# Patient Record
Sex: FEMALE | Race: WHITE | NOT HISPANIC OR LATINO
[De-identification: names, ages, dates, MRNs, and addresses within clinical notes are randomized per-mention and may not be internally consistent; named-entity substitution may affect disease eponyms.]

---

## 2017-03-30 ENCOUNTER — RECORD ABSTRACTING (OUTPATIENT)
Age: 42
End: 2017-03-30

## 2017-03-30 DIAGNOSIS — N32.81 OVERACTIVE BLADDER: ICD-10-CM

## 2017-03-30 DIAGNOSIS — M67.40 GANGLION, UNSPECIFIED SITE: ICD-10-CM

## 2017-03-30 DIAGNOSIS — F41.9 ANXIETY DISORDER, UNSPECIFIED: ICD-10-CM

## 2017-03-30 DIAGNOSIS — Z12.4 ENCOUNTER FOR SCREENING FOR MALIGNANT NEOPLASM OF CERVIX: ICD-10-CM

## 2017-04-05 ENCOUNTER — OUTPATIENT (OUTPATIENT)
Dept: OUTPATIENT SERVICES | Facility: HOSPITAL | Age: 42
LOS: 1 days | Discharge: HOME | End: 2017-04-05

## 2017-06-27 DIAGNOSIS — E11.9 TYPE 2 DIABETES MELLITUS WITHOUT COMPLICATIONS: ICD-10-CM

## 2017-06-27 DIAGNOSIS — E53.8 DEFICIENCY OF OTHER SPECIFIED B GROUP VITAMINS: ICD-10-CM

## 2017-06-27 DIAGNOSIS — N39.0 URINARY TRACT INFECTION, SITE NOT SPECIFIED: ICD-10-CM

## 2017-06-27 DIAGNOSIS — E55.9 VITAMIN D DEFICIENCY, UNSPECIFIED: ICD-10-CM

## 2017-06-27 DIAGNOSIS — D64.9 ANEMIA, UNSPECIFIED: ICD-10-CM

## 2017-07-17 ENCOUNTER — OUTPATIENT (OUTPATIENT)
Dept: OUTPATIENT SERVICES | Facility: HOSPITAL | Age: 42
LOS: 1 days | Discharge: HOME | End: 2017-07-17

## 2017-07-17 DIAGNOSIS — E03.9 HYPOTHYROIDISM, UNSPECIFIED: ICD-10-CM

## 2017-07-17 DIAGNOSIS — I10 ESSENTIAL (PRIMARY) HYPERTENSION: ICD-10-CM

## 2017-07-17 DIAGNOSIS — E11.9 TYPE 2 DIABETES MELLITUS WITHOUT COMPLICATIONS: ICD-10-CM

## 2017-07-17 DIAGNOSIS — N18.3 CHRONIC KIDNEY DISEASE, STAGE 3 (MODERATE): ICD-10-CM

## 2017-08-28 ENCOUNTER — OUTPATIENT (OUTPATIENT)
Dept: OUTPATIENT SERVICES | Facility: HOSPITAL | Age: 42
LOS: 1 days | Discharge: HOME | End: 2017-08-28

## 2017-08-28 DIAGNOSIS — R31.9 HEMATURIA, UNSPECIFIED: ICD-10-CM

## 2018-02-28 ENCOUNTER — OUTPATIENT (OUTPATIENT)
Dept: OUTPATIENT SERVICES | Facility: HOSPITAL | Age: 43
LOS: 1 days | Discharge: HOME | End: 2018-02-28

## 2018-02-28 DIAGNOSIS — E78.5 HYPERLIPIDEMIA, UNSPECIFIED: ICD-10-CM

## 2018-02-28 DIAGNOSIS — N39.0 URINARY TRACT INFECTION, SITE NOT SPECIFIED: ICD-10-CM

## 2018-02-28 DIAGNOSIS — E11.9 TYPE 2 DIABETES MELLITUS WITHOUT COMPLICATIONS: ICD-10-CM

## 2018-02-28 DIAGNOSIS — D50.9 IRON DEFICIENCY ANEMIA, UNSPECIFIED: ICD-10-CM

## 2018-02-28 DIAGNOSIS — E55.9 VITAMIN D DEFICIENCY, UNSPECIFIED: ICD-10-CM

## 2018-02-28 DIAGNOSIS — E53.8 DEFICIENCY OF OTHER SPECIFIED B GROUP VITAMINS: ICD-10-CM

## 2018-02-28 DIAGNOSIS — D64.9 ANEMIA, UNSPECIFIED: ICD-10-CM

## 2018-02-28 DIAGNOSIS — E05.90 THYROTOXICOSIS, UNSPECIFIED WITHOUT THYROTOXIC CRISIS OR STORM: ICD-10-CM

## 2018-06-05 ENCOUNTER — OUTPATIENT (OUTPATIENT)
Dept: OUTPATIENT SERVICES | Facility: HOSPITAL | Age: 43
LOS: 1 days | Discharge: HOME | End: 2018-06-05

## 2018-06-05 ENCOUNTER — RESULT REVIEW (OUTPATIENT)
Age: 43
End: 2018-06-05

## 2018-06-06 DIAGNOSIS — N89.8 OTHER SPECIFIED NONINFLAMMATORY DISORDERS OF VAGINA: ICD-10-CM

## 2018-09-19 ENCOUNTER — OUTPATIENT (OUTPATIENT)
Dept: OUTPATIENT SERVICES | Facility: HOSPITAL | Age: 43
LOS: 1 days | Discharge: HOME | End: 2018-09-19

## 2018-09-19 DIAGNOSIS — A69.20 LYME DISEASE, UNSPECIFIED: ICD-10-CM

## 2018-09-19 DIAGNOSIS — E11.9 TYPE 2 DIABETES MELLITUS WITHOUT COMPLICATIONS: ICD-10-CM

## 2018-09-19 DIAGNOSIS — N39.0 URINARY TRACT INFECTION, SITE NOT SPECIFIED: ICD-10-CM

## 2018-09-19 DIAGNOSIS — E64.9 SEQUELAE OF UNSPECIFIED NUTRITIONAL DEFICIENCY: ICD-10-CM

## 2018-09-19 DIAGNOSIS — E05.90 THYROTOXICOSIS, UNSPECIFIED WITHOUT THYROTOXIC CRISIS OR STORM: ICD-10-CM

## 2018-09-19 DIAGNOSIS — E78.5 HYPERLIPIDEMIA, UNSPECIFIED: ICD-10-CM

## 2018-09-19 DIAGNOSIS — D50.9 IRON DEFICIENCY ANEMIA, UNSPECIFIED: ICD-10-CM

## 2018-09-19 DIAGNOSIS — E55.9 VITAMIN D DEFICIENCY, UNSPECIFIED: ICD-10-CM

## 2018-09-19 LAB
ALBUMIN SERPL ELPH-MCNC: 4.8 G/DL — SIGNIFICANT CHANGE UP (ref 3.5–5.2)
ALP SERPL-CCNC: 54 U/L — SIGNIFICANT CHANGE UP (ref 30–115)
ALT FLD-CCNC: 9 U/L — SIGNIFICANT CHANGE UP (ref 0–41)
ANION GAP SERPL CALC-SCNC: 14 MMOL/L — SIGNIFICANT CHANGE UP (ref 7–14)
APPEARANCE UR: ABNORMAL
AST SERPL-CCNC: 15 U/L — SIGNIFICANT CHANGE UP (ref 0–41)
BACTERIA # UR AUTO: ABNORMAL /HPF
BASOPHILS # BLD AUTO: 0.05 K/UL — SIGNIFICANT CHANGE UP (ref 0–0.2)
BASOPHILS NFR BLD AUTO: 1 % — SIGNIFICANT CHANGE UP (ref 0–1)
BILIRUB SERPL-MCNC: 0.5 MG/DL — SIGNIFICANT CHANGE UP (ref 0.2–1.2)
BILIRUB UR-MCNC: NEGATIVE — SIGNIFICANT CHANGE UP
BUN SERPL-MCNC: 23 MG/DL — HIGH (ref 10–20)
CALCIUM SERPL-MCNC: 9.3 MG/DL — SIGNIFICANT CHANGE UP (ref 8.5–10.1)
CHLORIDE SERPL-SCNC: 101 MMOL/L — SIGNIFICANT CHANGE UP (ref 98–110)
CHOLEST SERPL-MCNC: 191 MG/DL — SIGNIFICANT CHANGE UP (ref 100–200)
CO2 SERPL-SCNC: 24 MMOL/L — SIGNIFICANT CHANGE UP (ref 17–32)
COD CRY URNS QL: ABNORMAL /HPF
COLOR SPEC: SIGNIFICANT CHANGE UP
CREAT SERPL-MCNC: 0.9 MG/DL — SIGNIFICANT CHANGE UP (ref 0.7–1.5)
DIFF PNL FLD: ABNORMAL
EOSINOPHIL # BLD AUTO: 0.18 K/UL — SIGNIFICANT CHANGE UP (ref 0–0.7)
EOSINOPHIL NFR BLD AUTO: 3.5 % — SIGNIFICANT CHANGE UP (ref 0–8)
EPI CELLS # UR: ABNORMAL /HPF
ERYTHROCYTE [SEDIMENTATION RATE] IN BLOOD: 8 MM/HR — SIGNIFICANT CHANGE UP (ref 0–15)
ESTIMATED AVERAGE GLUCOSE: 105 MG/DL — SIGNIFICANT CHANGE UP (ref 68–114)
GLUCOSE SERPL-MCNC: 106 MG/DL — HIGH (ref 70–99)
GLUCOSE UR QL: NEGATIVE — SIGNIFICANT CHANGE UP
HBA1C BLD-MCNC: 5.3 % — SIGNIFICANT CHANGE UP (ref 4–5.6)
HCT VFR BLD CALC: 36.3 % — LOW (ref 37–47)
HDLC SERPL-MCNC: 95 MG/DL — SIGNIFICANT CHANGE UP
HGB BLD-MCNC: 11.5 G/DL — LOW (ref 12–16)
IMM GRANULOCYTES NFR BLD AUTO: 0.4 % — HIGH (ref 0.1–0.3)
IRON SATN MFR SERPL: 138 UG/DL — SIGNIFICANT CHANGE UP (ref 35–150)
KETONES UR-MCNC: NEGATIVE — SIGNIFICANT CHANGE UP
LEUKOCYTE ESTERASE UR-ACNC: NEGATIVE — SIGNIFICANT CHANGE UP
LIPID PNL WITH DIRECT LDL SERPL: 97 MG/DL — SIGNIFICANT CHANGE UP (ref 4–129)
LYMPHOCYTES # BLD AUTO: 1.29 K/UL — SIGNIFICANT CHANGE UP (ref 1.2–3.4)
LYMPHOCYTES # BLD AUTO: 25.3 % — SIGNIFICANT CHANGE UP (ref 20.5–51.1)
MCHC RBC-ENTMCNC: 30 PG — SIGNIFICANT CHANGE UP (ref 27–31)
MCHC RBC-ENTMCNC: 31.7 G/DL — LOW (ref 32–37)
MCV RBC AUTO: 94.8 FL — SIGNIFICANT CHANGE UP (ref 81–99)
MONOCYTES # BLD AUTO: 0.47 K/UL — SIGNIFICANT CHANGE UP (ref 0.1–0.6)
MONOCYTES NFR BLD AUTO: 9.2 % — SIGNIFICANT CHANGE UP (ref 1.7–9.3)
NEUTROPHILS # BLD AUTO: 3.09 K/UL — SIGNIFICANT CHANGE UP (ref 1.4–6.5)
NEUTROPHILS NFR BLD AUTO: 60.6 % — SIGNIFICANT CHANGE UP (ref 42.2–75.2)
NITRITE UR-MCNC: NEGATIVE — SIGNIFICANT CHANGE UP
NRBC # BLD: 0 /100 WBCS — SIGNIFICANT CHANGE UP (ref 0–0)
PH UR: 6 — SIGNIFICANT CHANGE UP (ref 5–8)
PLATELET # BLD AUTO: 315 K/UL — SIGNIFICANT CHANGE UP (ref 130–400)
POTASSIUM SERPL-MCNC: 4.1 MMOL/L — SIGNIFICANT CHANGE UP (ref 3.5–5)
POTASSIUM SERPL-SCNC: 4.1 MMOL/L — SIGNIFICANT CHANGE UP (ref 3.5–5)
PROT SERPL-MCNC: 7.5 G/DL — SIGNIFICANT CHANGE UP (ref 6–8)
PROT UR-MCNC: ABNORMAL
RBC # BLD: 3.83 M/UL — LOW (ref 4.2–5.4)
RBC # FLD: 14.6 % — HIGH (ref 11.5–14.5)
RBC CASTS # UR COMP ASSIST: ABNORMAL /HPF
SODIUM SERPL-SCNC: 139 MMOL/L — SIGNIFICANT CHANGE UP (ref 135–146)
SP GR SPEC: >=1.03 — SIGNIFICANT CHANGE UP (ref 1.01–1.03)
TOTAL CHOLESTEROL/HDL RATIO MEASUREMENT: 2 RATIO — LOW (ref 4–5.5)
TRIGL SERPL-MCNC: 111 MG/DL — SIGNIFICANT CHANGE UP (ref 10–149)
UROBILINOGEN FLD QL: 0.2 — SIGNIFICANT CHANGE UP (ref 0.2–0.2)
WBC # BLD: 5.1 K/UL — SIGNIFICANT CHANGE UP (ref 4.8–10.8)
WBC # FLD AUTO: 5.1 K/UL — SIGNIFICANT CHANGE UP (ref 4.8–10.8)
WBC UR QL: SIGNIFICANT CHANGE UP /HPF

## 2018-09-20 LAB
24R-OH-CALCIDIOL SERPL-MCNC: 78 NG/ML — SIGNIFICANT CHANGE UP (ref 30–80)
CCP IGG SERPL-ACNC: <8 UNITS — SIGNIFICANT CHANGE UP (ref 0–19)
CRP SERPL-MCNC: 0.11 MG/DL — SIGNIFICANT CHANGE UP (ref 0–0.4)
DSDNA AB SER-ACNC: <12 IU/ML — SIGNIFICANT CHANGE UP
FERRITIN SERPL-MCNC: 17 NG/ML — SIGNIFICANT CHANGE UP (ref 15–150)
FOLATE SERPL-MCNC: >20 NG/ML — SIGNIFICANT CHANGE UP
RF+CCP IGG SER-IMP: NEGATIVE — SIGNIFICANT CHANGE UP
RHEUMATOID FACT SERPL-ACNC: <10 IU/ML — SIGNIFICANT CHANGE UP (ref 0–13)
THYROGLOB AB FLD IA-ACNC: <20 IU/ML — SIGNIFICANT CHANGE UP (ref 0–40)
THYROGLOB AB SERPL-ACNC: <20 IU/ML — SIGNIFICANT CHANGE UP (ref 0–40)
TSH SERPL-MCNC: 1.91 UIU/ML — SIGNIFICANT CHANGE UP (ref 0.27–4.2)
VIT B12 SERPL-MCNC: 439 PG/ML — SIGNIFICANT CHANGE UP (ref 232–1245)

## 2018-09-21 LAB
ANA PAT FLD IF-IMP: SIGNIFICANT CHANGE UP
ANA TITR SER: ABNORMAL
B BURGDOR C6 AB SER-ACNC: NEGATIVE — SIGNIFICANT CHANGE UP
B BURGDOR IGG+IGM SER-ACNC: 0.14 INDEX — SIGNIFICANT CHANGE UP (ref 0.01–0.89)

## 2019-03-04 ENCOUNTER — OUTPATIENT (OUTPATIENT)
Dept: OUTPATIENT SERVICES | Facility: HOSPITAL | Age: 44
LOS: 1 days | Discharge: HOME | End: 2019-03-04

## 2019-03-04 DIAGNOSIS — Z11.1 ENCOUNTER FOR SCREENING FOR RESPIRATORY TUBERCULOSIS: ICD-10-CM

## 2019-06-10 ENCOUNTER — RESULT REVIEW (OUTPATIENT)
Age: 44
End: 2019-06-10

## 2019-06-10 ENCOUNTER — OUTPATIENT (OUTPATIENT)
Dept: OUTPATIENT SERVICES | Facility: HOSPITAL | Age: 44
LOS: 1 days | Discharge: HOME | End: 2019-06-10

## 2019-06-12 DIAGNOSIS — Z12.4 ENCOUNTER FOR SCREENING FOR MALIGNANT NEOPLASM OF CERVIX: ICD-10-CM

## 2019-09-23 ENCOUNTER — APPOINTMENT (OUTPATIENT)
Dept: GASTROENTEROLOGY | Facility: CLINIC | Age: 44
End: 2019-09-23
Payer: COMMERCIAL

## 2019-09-23 VITALS
WEIGHT: 129 LBS | DIASTOLIC BLOOD PRESSURE: 98 MMHG | HEIGHT: 66 IN | HEART RATE: 83 BPM | BODY MASS INDEX: 20.73 KG/M2 | SYSTOLIC BLOOD PRESSURE: 126 MMHG

## 2019-09-23 DIAGNOSIS — Z87.898 PERSONAL HISTORY OF OTHER SPECIFIED CONDITIONS: ICD-10-CM

## 2019-09-23 DIAGNOSIS — Z86.79 PERSONAL HISTORY OF OTHER DISEASES OF THE CIRCULATORY SYSTEM: ICD-10-CM

## 2019-09-23 DIAGNOSIS — Z78.9 OTHER SPECIFIED HEALTH STATUS: ICD-10-CM

## 2019-09-23 DIAGNOSIS — G43.909 MIGRAINE, UNSPECIFIED, NOT INTRACTABLE, W/OUT STATUS MIGRAINOSUS: ICD-10-CM

## 2019-09-23 PROCEDURE — 99204 OFFICE O/P NEW MOD 45 MIN: CPT

## 2019-09-23 NOTE — PHYSICAL EXAM
[General Appearance - Alert] : alert [Sclera] : the sclera and conjunctiva were normal [Neck Appearance] : the appearance of the neck was normal [Auscultation Breath Sounds / Voice Sounds] : lungs were clear to auscultation bilaterally [Heart Sounds] : normal S1 and S2 [Abdomen Soft] : soft [Bowel Sounds] : normal bowel sounds [] : no hepato-splenomegaly [Abdomen Tenderness] : non-tender [Abdomen Mass (___ Cm)] : no abdominal mass palpated [No CVA Tenderness] : no ~M costovertebral angle tenderness [Skin Color & Pigmentation] : normal skin color and pigmentation [No Focal Deficits] : no focal deficits [Oriented To Time, Place, And Person] : oriented to person, place, and time

## 2019-09-23 NOTE — ASSESSMENT
[FreeTextEntry1] : 44 year old female pt with increased risk for CRC and new onset GERD not responsive to OTC PPI, here for EGD, colonoscopy.\par No GI complaints otherwise.\par \par Rec: EGD, colonoscopy\par Risks and benefits discussed with patient.\par

## 2019-09-23 NOTE — HISTORY OF PRESENT ILLNESS
[de-identified] : 44 year old female pt with increased risk for CRC and new onset GERD not responsive to OTC PPI, here for EGD, colonoscopy.\par No GI complaints otherwise.

## 2019-11-29 NOTE — ASU PATIENT PROFILE, ADULT - NS PRO PT RIGHT SUPPORT PERSON
Visit Information Date & Time Provider Department Dept. Phone Encounter #  
 4/18/2017  9:45 AM Julia Gomez, [address] [facility] [phone] [id number] Upcoming Health Maintenance Date Due Hepatitis C Screening 1965 DTaP/Tdap/Td series (1 - Tdap) 3/2/1984 FOBT Q 1 YEAR AGE 50-75 3/2/2013 INFLUENZA AGE 9 TO ADULT 8/1/2016 PAP AKA CERVICAL CYTOLOGY 12/8/2017 BREAST CANCER SCRN MAMMOGRAM 1/19/2019 Allergies as of 4/18/2017  Review Complete On: 4/18/2017 By: Julia Gomez MD  
  
 Severity Noted Reaction Type Reactions Sulfa Dyne  09/24/2012    Rash, Itching Current Immunizations  Never Reviewed No immunizations on file. Not reviewed this visit You Were Diagnosed With   
  
 Codes Comments Varicose veins with complications    -  Primary ICD-10-CM: N29.038 ICD-9-CM: 454.8 Vitals BP Pulse Resp Height(growth percentile) Weight(growth percentile) BMI  
 102/58 (BP 1 Location: Left arm, BP Patient Position: Sitting) 69 12 5' 3\" (1.6 m) 110 lb (49.9 kg) 19.49 kg/m2 OB Status Smoking Status Menopause Former Smoker Vitals History BMI and BSA Data Body Mass Index Body Surface Area  
 19.49 kg/m 2 1.49 m 2 Your Updated Medication List  
  
   
This list is accurate as of: 4/18/17 11:18 AM.  Always use your most recent med list.  
  
  
  
  
 ADDERALL 10 mg tablet Generic drug:  dextroamphetamine-amphetamine Take  by mouth. ADVIL 100 mg tablet Generic drug:  ibuprofen Take  by mouth. FLEXERIL PO Take  by mouth. folic acid 1 mg tablet Commonly known as:  [employer] Take  by mouth daily. FORTEO 20 mcg/dose - 600 mcg/2.4 mL Pnij injection Generic drug:  teriparatide 20 mcg by SubCUTAneous route daily. GABAPENTIN PO Take  by mouth. HUMIRA 40 mg/0.8 mL injection Generic drug:  adalimumab  
by SubCUTAneous route once. METHOTREXATE SODIUM PO Take  by mouth. ORENCIA SC  
by SubCUTAneous route. oxyCODONE-acetaminophen 5-325 mg per tablet Commonly known as:  PERCOCET Take 1-2 Tabs by mouth every four (4) hours as needed. Max Daily Amount: 12 Tabs. potassium chloride 10 mEq tablet Commonly known as:  K-DUR, KLOR-CON  
take 1 tablet by mouth once daily ONLY WHEN TAKING FUROSEMIDE  
  
 predniSONE 5 mg tablet Commonly known as:  Ana Laura Duane Take  by mouth. venlafaxine 100 mg tablet Commonly known as:  [facility] Take  by mouth. VITAMIN D2 50,000 unit capsule Generic drug:  ergocalciferol  
take 1 capsule by mouth every week  
  
 zolpidem 5 mg tablet Commonly known as:  AMBIEN Take  by mouth nightly as needed for Sleep. We Performed the Following INJECTION THERAPY VEIN,MULT VEINS [70689 CPT(R)] To-Do List   
 04/18/2017 3:00 PM  
  Appointment with [address] [facility] 1 at [facility] [address] ([phone])  
  
 04/20/2017 4:30 PM  
  Appointment with [place], PT at [facility] [address] ([phone]) Please provide this summary of care documentation to your next provider. Your primary care clinician is listed as Oliva Oakley. If you have any questions after today's visit, please call [phone]. Yes

## 2019-11-30 ENCOUNTER — TRANSCRIPTION ENCOUNTER (OUTPATIENT)
Age: 44
End: 2019-11-30

## 2019-12-02 ENCOUNTER — TRANSCRIPTION ENCOUNTER (OUTPATIENT)
Age: 44
End: 2019-12-02

## 2019-12-02 ENCOUNTER — RESULT REVIEW (OUTPATIENT)
Age: 44
End: 2019-12-02

## 2019-12-02 ENCOUNTER — OUTPATIENT (OUTPATIENT)
Dept: OUTPATIENT SERVICES | Facility: HOSPITAL | Age: 44
LOS: 1 days | Discharge: HOME | End: 2019-12-02
Payer: COMMERCIAL

## 2019-12-02 VITALS
TEMPERATURE: 98 F | HEIGHT: 66 IN | HEART RATE: 105 BPM | OXYGEN SATURATION: 98 % | RESPIRATION RATE: 17 BRPM | SYSTOLIC BLOOD PRESSURE: 119 MMHG | DIASTOLIC BLOOD PRESSURE: 74 MMHG | WEIGHT: 126.1 LBS

## 2019-12-02 VITALS — RESPIRATION RATE: 17 BRPM | DIASTOLIC BLOOD PRESSURE: 89 MMHG | HEART RATE: 94 BPM | SYSTOLIC BLOOD PRESSURE: 133 MMHG

## 2019-12-02 DIAGNOSIS — Z98.890 OTHER SPECIFIED POSTPROCEDURAL STATES: Chronic | ICD-10-CM

## 2019-12-02 DIAGNOSIS — M67.40 GANGLION, UNSPECIFIED SITE: Chronic | ICD-10-CM

## 2019-12-02 DIAGNOSIS — Z90.49 ACQUIRED ABSENCE OF OTHER SPECIFIED PARTS OF DIGESTIVE TRACT: Chronic | ICD-10-CM

## 2019-12-02 PROCEDURE — 88312 SPECIAL STAINS GROUP 1: CPT | Mod: 26

## 2019-12-02 PROCEDURE — 45385 COLONOSCOPY W/LESION REMOVAL: CPT

## 2019-12-02 PROCEDURE — 88305 TISSUE EXAM BY PATHOLOGIST: CPT | Mod: 26

## 2019-12-02 PROCEDURE — 43239 EGD BIOPSY SINGLE/MULTIPLE: CPT | Mod: XS

## 2019-12-02 NOTE — PRE-ANESTHESIA EVALUATION ADULT - NSANTHOSAYNRD_GEN_A_CORE
No. MOHSEN screening performed.  STOP BANG Legend: 0-2 = LOW Risk; 3-4 = INTERMEDIATE Risk; 5-8 = HIGH Risk

## 2019-12-02 NOTE — ASU DISCHARGE PLAN (ADULT/PEDIATRIC) - CARE PROVIDER_API CALL
Alix Monge)  Internal Medicine  [address] [place] [address]  Phone: [phone]  Fax: [phone]  Follow Up Time:

## 2019-12-03 LAB — SURGICAL PATHOLOGY STUDY: SIGNIFICANT CHANGE UP

## 2019-12-04 LAB — SURGICAL PATHOLOGY STUDY: SIGNIFICANT CHANGE UP

## 2019-12-05 DIAGNOSIS — K29.50 UNSPECIFIED CHRONIC GASTRITIS WITHOUT BLEEDING: ICD-10-CM

## 2019-12-05 DIAGNOSIS — Z88.1 ALLERGY STATUS TO OTHER ANTIBIOTIC AGENTS STATUS: ICD-10-CM

## 2019-12-05 DIAGNOSIS — Z12.11 ENCOUNTER FOR SCREENING FOR MALIGNANT NEOPLASM OF COLON: ICD-10-CM

## 2019-12-05 DIAGNOSIS — K21.9 GASTRO-ESOPHAGEAL REFLUX DISEASE WITHOUT ESOPHAGITIS: ICD-10-CM

## 2019-12-05 DIAGNOSIS — K64.8 OTHER HEMORRHOIDS: ICD-10-CM

## 2019-12-05 DIAGNOSIS — D12.2 BENIGN NEOPLASM OF ASCENDING COLON: ICD-10-CM

## 2019-12-05 DIAGNOSIS — Z88.2 ALLERGY STATUS TO SULFONAMIDES: ICD-10-CM

## 2020-01-09 PROBLEM — I10 ESSENTIAL (PRIMARY) HYPERTENSION: Chronic | Status: ACTIVE | Noted: 2019-12-02

## 2020-01-10 ENCOUNTER — OUTPATIENT (OUTPATIENT)
Dept: OUTPATIENT SERVICES | Facility: HOSPITAL | Age: 45
LOS: 1 days | Discharge: HOME | End: 2020-01-10
Payer: COMMERCIAL

## 2020-01-10 DIAGNOSIS — Z98.890 OTHER SPECIFIED POSTPROCEDURAL STATES: Chronic | ICD-10-CM

## 2020-01-10 DIAGNOSIS — Z90.49 ACQUIRED ABSENCE OF OTHER SPECIFIED PARTS OF DIGESTIVE TRACT: Chronic | ICD-10-CM

## 2020-01-10 DIAGNOSIS — R22.1 LOCALIZED SWELLING, MASS AND LUMP, NECK: ICD-10-CM

## 2020-01-10 DIAGNOSIS — M67.40 GANGLION, UNSPECIFIED SITE: Chronic | ICD-10-CM

## 2020-01-10 PROCEDURE — 76536 US EXAM OF HEAD AND NECK: CPT | Mod: 26

## 2020-01-22 ENCOUNTER — APPOINTMENT (OUTPATIENT)
Dept: OTOLARYNGOLOGY | Facility: CLINIC | Age: 45
End: 2020-01-22
Payer: COMMERCIAL

## 2020-01-22 ENCOUNTER — APPOINTMENT (OUTPATIENT)
Dept: PLASTIC SURGERY | Facility: CLINIC | Age: 45
End: 2020-01-22

## 2020-01-22 VITALS — WEIGHT: 123 LBS | BODY MASS INDEX: 19.77 KG/M2 | HEIGHT: 66 IN

## 2020-01-22 PROCEDURE — 99204 OFFICE O/P NEW MOD 45 MIN: CPT | Mod: 25

## 2020-01-22 PROCEDURE — 31575 DIAGNOSTIC LARYNGOSCOPY: CPT

## 2020-01-22 NOTE — PHYSICAL EXAM
[Midline] : trachea located in midline position [Normal] : no rashes [de-identified] : left sided lymphadenopathy

## 2020-01-22 NOTE — ASSESSMENT
[FreeTextEntry1] : Follow up with Rheumatology\par Continue to follow up with PCP\par Patient to f/u in 6 months for repeat sono\par

## 2020-01-22 NOTE — HISTORY OF PRESENT ILLNESS
[de-identified] : Patient presents today with c/o left sided neck lump. Patient states she noticed it about one month. Patient thought it was reaction due to having sensitive skin. Patient states she notices change in texture. She admits today is the smallest it has felt. Patient denies any recent URI. Patient was seen by her PCP regarding lump and an ultrasound was completed, reviewed by me. Denies fever. \par \par Patient also concerned with two recent strep infections. She admits one episode in June 2019 and November 2019. Rapid strep was negative but cultures were both positive. Patient denies any history of strep infections in the past. \par \par Patient also mentions abnormal thyroid antibodies. Patient notes to be feeling lethargic, sluggish and anxious/agitated as well as lacking sleep.

## 2020-01-22 NOTE — PROCEDURE
[Topical Lidocaine] : topical lidocaine [Lesion] : lesion identified by mirror examination needing further evaluation [Flexible Endoscope] : examined with the flexible endoscope [Oxymetazoline HCl] : oxymetazoline HCl [Normal] : posterior cricoid area had healthy pink mucosa in the interarytenoid area and the esophageal inlet

## 2020-01-27 ENCOUNTER — APPOINTMENT (OUTPATIENT)
Dept: PLASTIC SURGERY | Facility: CLINIC | Age: 45
End: 2020-01-27

## 2020-04-25 ENCOUNTER — MESSAGE (OUTPATIENT)
Age: 45
End: 2020-04-25

## 2020-08-06 ENCOUNTER — OUTPATIENT (OUTPATIENT)
Dept: OUTPATIENT SERVICES | Facility: HOSPITAL | Age: 45
LOS: 1 days | Discharge: HOME | End: 2020-08-06
Payer: COMMERCIAL

## 2020-08-06 DIAGNOSIS — Z90.49 ACQUIRED ABSENCE OF OTHER SPECIFIED PARTS OF DIGESTIVE TRACT: Chronic | ICD-10-CM

## 2020-08-06 DIAGNOSIS — M67.40 GANGLION, UNSPECIFIED SITE: Chronic | ICD-10-CM

## 2020-08-06 DIAGNOSIS — Z98.890 OTHER SPECIFIED POSTPROCEDURAL STATES: Chronic | ICD-10-CM

## 2020-08-06 DIAGNOSIS — N64.4 MASTODYNIA: ICD-10-CM

## 2020-08-06 DIAGNOSIS — R10.11 RIGHT UPPER QUADRANT PAIN: ICD-10-CM

## 2020-08-06 PROCEDURE — 76830 TRANSVAGINAL US NON-OB: CPT | Mod: 26

## 2020-08-06 PROCEDURE — 76856 US EXAM PELVIC COMPLETE: CPT | Mod: 26

## 2020-08-06 PROCEDURE — 76641 ULTRASOUND BREAST COMPLETE: CPT | Mod: 26,50

## 2021-01-05 ENCOUNTER — TRANSCRIPTION ENCOUNTER (OUTPATIENT)
Age: 46
End: 2021-01-05

## 2021-01-23 ENCOUNTER — OUTPATIENT (OUTPATIENT)
Dept: OUTPATIENT SERVICES | Facility: HOSPITAL | Age: 46
LOS: 1 days | Discharge: HOME | End: 2021-01-23
Payer: COMMERCIAL

## 2021-01-23 DIAGNOSIS — Z98.890 OTHER SPECIFIED POSTPROCEDURAL STATES: Chronic | ICD-10-CM

## 2021-01-23 DIAGNOSIS — R22.0 LOCALIZED SWELLING, MASS AND LUMP, HEAD: ICD-10-CM

## 2021-01-23 DIAGNOSIS — Z90.49 ACQUIRED ABSENCE OF OTHER SPECIFIED PARTS OF DIGESTIVE TRACT: Chronic | ICD-10-CM

## 2021-01-23 DIAGNOSIS — M67.40 GANGLION, UNSPECIFIED SITE: Chronic | ICD-10-CM

## 2021-01-23 PROCEDURE — 76536 US EXAM OF HEAD AND NECK: CPT | Mod: 26

## 2021-01-28 ENCOUNTER — NON-APPOINTMENT (OUTPATIENT)
Age: 46
End: 2021-01-28

## 2021-02-03 ENCOUNTER — APPOINTMENT (OUTPATIENT)
Dept: OTOLARYNGOLOGY | Facility: CLINIC | Age: 46
End: 2021-02-03
Payer: COMMERCIAL

## 2021-02-03 PROCEDURE — 99072 ADDL SUPL MATRL&STAF TM PHE: CPT

## 2021-02-03 PROCEDURE — 99213 OFFICE O/P EST LOW 20 MIN: CPT

## 2021-02-03 NOTE — HISTORY OF PRESENT ILLNESS
[FreeTextEntry1] : Patient presents today following up on left sided swollen lymph node. Patient states she is unsure if swelling went down for a while or patient stopped noticing it. She feels that it got bigger since the last time she felt it. No pain associated. She had recent neck sonogram revealing stable left neck lymph node compared to 1/2020.

## 2021-02-03 NOTE — PHYSICAL EXAM
[de-identified] : left sided lymphadenopathy  [Midline] : trachea located in midline position [Normal] : no rashes

## 2021-02-03 NOTE — DATA REVIEWED
[de-identified] : reviewed by me \par \par EXAM: US HEAD NECK SOFT TISSUE\par \par \par PROCEDURE DATE: 01/23/2021\par \par \par \par \par INTERPRETATION: Clinical History/Reason For Exam: sono neck-f/u REACTIVE LYMPHNODE SONO PEL /TV - F/U FIBROIDS\par \par Technique: US HEAD AND NECK SOFT TISSUE\par \par Comparison: 1/10/2020.\par \par Findings:\par Within the area of interest in the left neck indicated by patient, there is a lymph node measuring 1.25 x 0.36 x 0.75 cm; previously 1.25 x 0.39 x 0.66 cm.\par \par Impression:\par Stable left neck lymph node compared with prior examination 1/10/2020.\par \par \par \par \par HOMA SANCHEZ MD; Attending Radiologist\par This document has been electronically signed. Jan 23 2021 9:01PM

## 2021-02-06 ENCOUNTER — OUTPATIENT (OUTPATIENT)
Dept: OUTPATIENT SERVICES | Facility: HOSPITAL | Age: 46
LOS: 1 days | Discharge: HOME | End: 2021-02-06
Payer: COMMERCIAL

## 2021-02-06 DIAGNOSIS — Z90.49 ACQUIRED ABSENCE OF OTHER SPECIFIED PARTS OF DIGESTIVE TRACT: Chronic | ICD-10-CM

## 2021-02-06 DIAGNOSIS — M67.40 GANGLION, UNSPECIFIED SITE: Chronic | ICD-10-CM

## 2021-02-06 DIAGNOSIS — R10.2 PELVIC AND PERINEAL PAIN: ICD-10-CM

## 2021-02-06 DIAGNOSIS — Z98.890 OTHER SPECIFIED POSTPROCEDURAL STATES: Chronic | ICD-10-CM

## 2021-02-06 PROCEDURE — 76830 TRANSVAGINAL US NON-OB: CPT | Mod: 26

## 2021-02-06 PROCEDURE — 76856 US EXAM PELVIC COMPLETE: CPT | Mod: 26

## 2021-02-25 ENCOUNTER — APPOINTMENT (OUTPATIENT)
Dept: RHEUMATOLOGY | Facility: CLINIC | Age: 46
End: 2021-02-25

## 2021-04-28 ENCOUNTER — LABORATORY RESULT (OUTPATIENT)
Age: 46
End: 2021-04-28

## 2021-04-28 ENCOUNTER — APPOINTMENT (OUTPATIENT)
Dept: HEMATOLOGY ONCOLOGY | Facility: CLINIC | Age: 46
End: 2021-04-28
Payer: COMMERCIAL

## 2021-04-28 VITALS
HEART RATE: 107 BPM | HEIGHT: 68 IN | WEIGHT: 118 LBS | DIASTOLIC BLOOD PRESSURE: 90 MMHG | TEMPERATURE: 98.4 F | BODY MASS INDEX: 17.88 KG/M2 | SYSTOLIC BLOOD PRESSURE: 147 MMHG

## 2021-04-28 PROCEDURE — 88189 FLOWCYTOMETRY/READ 16 & >: CPT

## 2021-04-28 PROCEDURE — 99205 OFFICE O/P NEW HI 60 MIN: CPT

## 2021-04-28 RX ORDER — POLYETHYLENE GLYCOL 3350, SODIUM SULFATE, SODIUM CHLORIDE, POTASSIUM CHLORIDE, ASCORBIC ACID, SODIUM ASCORBATE 7.5-2.691G
100 KIT ORAL
Qty: 1 | Refills: 0 | Status: DISCONTINUED | COMMUNITY
Start: 2019-09-23 | End: 2021-04-28

## 2021-04-28 RX ORDER — LISINOPRIL 10 MG/1
10 TABLET ORAL
Refills: 0 | Status: DISCONTINUED | COMMUNITY
End: 2021-04-28

## 2021-04-29 ENCOUNTER — OUTPATIENT (OUTPATIENT)
Dept: OUTPATIENT SERVICES | Facility: HOSPITAL | Age: 46
LOS: 1 days | Discharge: HOME | End: 2021-04-29
Payer: COMMERCIAL

## 2021-04-29 ENCOUNTER — RESULT REVIEW (OUTPATIENT)
Age: 46
End: 2021-04-29

## 2021-04-29 DIAGNOSIS — N63.10 UNSPECIFIED LUMP IN THE RIGHT BREAST, UNSPECIFIED QUADRANT: ICD-10-CM

## 2021-04-29 DIAGNOSIS — M67.40 GANGLION, UNSPECIFIED SITE: Chronic | ICD-10-CM

## 2021-04-29 DIAGNOSIS — Z90.49 ACQUIRED ABSENCE OF OTHER SPECIFIED PARTS OF DIGESTIVE TRACT: Chronic | ICD-10-CM

## 2021-04-29 DIAGNOSIS — Z98.890 OTHER SPECIFIED POSTPROCEDURAL STATES: Chronic | ICD-10-CM

## 2021-04-29 LAB — RHEUMATOID FACT SER QL: <10 IU/ML

## 2021-04-29 PROCEDURE — 88305 TISSUE EXAM BY PATHOLOGIST: CPT | Mod: 26

## 2021-04-29 PROCEDURE — 88341 IMHCHEM/IMCYTCHM EA ADD ANTB: CPT | Mod: 26,59

## 2021-04-29 PROCEDURE — G0279: CPT | Mod: 26

## 2021-04-29 PROCEDURE — 88360 TUMOR IMMUNOHISTOCHEM/MANUAL: CPT | Mod: 26

## 2021-04-29 PROCEDURE — 19084 BX BREAST ADD LESION US IMAG: CPT | Mod: RT

## 2021-04-29 PROCEDURE — 76641 ULTRASOUND BREAST COMPLETE: CPT | Mod: 26,RT

## 2021-04-29 PROCEDURE — 88342 IMHCHEM/IMCYTCHM 1ST ANTB: CPT | Mod: 26,59

## 2021-04-29 PROCEDURE — 77066 DX MAMMO INCL CAD BI: CPT | Mod: 26

## 2021-04-29 PROCEDURE — 19083 BX BREAST 1ST LESION US IMAG: CPT | Mod: RT

## 2021-04-30 ENCOUNTER — NON-APPOINTMENT (OUTPATIENT)
Age: 46
End: 2021-04-30

## 2021-04-30 LAB — SURGICAL PATHOLOGY STUDY: SIGNIFICANT CHANGE UP

## 2021-05-02 ENCOUNTER — RESULT REVIEW (OUTPATIENT)
Age: 46
End: 2021-05-02

## 2021-05-02 ENCOUNTER — OUTPATIENT (OUTPATIENT)
Dept: OUTPATIENT SERVICES | Facility: HOSPITAL | Age: 46
LOS: 1 days | Discharge: HOME | End: 2021-05-02
Payer: COMMERCIAL

## 2021-05-02 DIAGNOSIS — R63.4 ABNORMAL WEIGHT LOSS: ICD-10-CM

## 2021-05-02 DIAGNOSIS — Z98.890 OTHER SPECIFIED POSTPROCEDURAL STATES: Chronic | ICD-10-CM

## 2021-05-02 DIAGNOSIS — M67.40 GANGLION, UNSPECIFIED SITE: Chronic | ICD-10-CM

## 2021-05-02 DIAGNOSIS — Z90.49 ACQUIRED ABSENCE OF OTHER SPECIFIED PARTS OF DIGESTIVE TRACT: Chronic | ICD-10-CM

## 2021-05-02 LAB
ALBUMIN SERPL ELPH-MCNC: 4.8 G/DL
ALP BLD-CCNC: 80 U/L
ALT SERPL-CCNC: 13 U/L
ANA PAT FLD IF-IMP: ABNORMAL
ANA SER IF-ACNC: ABNORMAL
ANION GAP SERPL CALC-SCNC: 14 MMOL/L
AST SERPL-CCNC: 17 U/L
BILIRUB SERPL-MCNC: 0.2 MG/DL
BUN SERPL-MCNC: 19 MG/DL
CALCIUM SERPL-MCNC: 9.4 MG/DL
CANCER AG15-3 SERPL-ACNC: 9.5 U/ML
CEA SERPL-MCNC: 2.5 NG/ML
CHLORIDE SERPL-SCNC: 103 MMOL/L
CO2 SERPL-SCNC: 25 MMOL/L
CREAT SERPL-MCNC: 0.8 MG/DL
GLUCOSE SERPL-MCNC: 109 MG/DL
HBV CORE IGG+IGM SER QL: NONREACTIVE
HBV CORE IGM SER QL: NONREACTIVE
HBV SURFACE AB SER QL: REACTIVE
HBV SURFACE AG SER QL: NONREACTIVE
HCT VFR BLD CALC: 39.7 %
HCV AB SER QL: NONREACTIVE
HCV S/CO RATIO: 0.13 S/CO
HGB BLD-MCNC: 12.9 G/DL
HIV1+2 AB SPEC QL IA.RAPID: NONREACTIVE
LDH SERPL-CCNC: 177
MCHC RBC-ENTMCNC: 29.9 PG
MCHC RBC-ENTMCNC: 32.5 G/DL
MCV RBC AUTO: 91.9 FL
PLATELET # BLD AUTO: 316 K/UL
PMV BLD: 8.7 FL
POTASSIUM SERPL-SCNC: 4.6 MMOL/L
PROT SERPL-MCNC: 7.2 G/DL
RBC # BLD: 4.32 M/UL
RBC # FLD: 13.2 %
SODIUM SERPL-SCNC: 142 MMOL/L
WBC # FLD AUTO: 6.56 K/UL

## 2021-05-02 PROCEDURE — 71260 CT THORAX DX C+: CPT | Mod: 26

## 2021-05-02 PROCEDURE — 74177 CT ABD & PELVIS W/CONTRAST: CPT | Mod: 26

## 2021-05-02 NOTE — REVIEW OF SYSTEMS
[Night Sweats] : night sweats [Fatigue] : fatigue [Recent Change In Weight] : ~T recent weight change [Swollen Glands] : swollen glands [Negative] : Allergic/Immunologic

## 2021-05-02 NOTE — PHYSICAL EXAM
[Fully active, able to carry on all pre-disease performance without restriction] : Status 0 - Fully active, able to carry on all pre-disease performance without restriction [Normal] : affect appropriate [de-identified] : Rt breast mass UOQ [de-identified] : enlarged B/l inguinal Lns

## 2021-05-02 NOTE — ASSESSMENT
[FreeTextEntry1] : In summary this is a 45 year old woman who presented for eval of wt loss and lymphadenopathy. On my exam a rt breast mass was found.\par \par RECOMMENDATIONS\par RT DIAG mammogram and USG,\par CT neck, chest, abd and pel with contrast \par Labs as noted below.\par RTC after above w/u is completed

## 2021-05-02 NOTE — RESULTS/DATA
[FreeTextEntry1] : US TRANSVAGINAL\par EXAM: US PELVIC COMPLETE\par \par \par PROCEDURE DATE: 02/06/2021\par \par \par \par \par INTERPRETATION: CLINICAL INFORMATION: Fibroid uterus\par \par LMP: 01/21/2021\par \par COMPARISON: August 6, 2020.\par \par TECHNIQUE:\par Transabdominal and transvaginal.\par \par FINDINGS:\par \par Uterus: Heterogeneous 8.4 cm x 4.2 cm x 5.3 cm containing fibroids the largest of which is posterior submucosal measuring 1.3 cm in its greatest diameter. Two endometrial polyps the larger of which measures 1.2 cm in its greatest.\par Endometrium: Not thickened 1.4 cm\par ..\par \par Right ovary: 3.8 cm x 2.5 cm x 3.1 cm containing a 2.3 cm hemorrhagic cyst and subcentimeter follicles\par \par Left ovary: 2.6 cm x 1.0 cm x 1.5 cm containing a subcentimeter follicle.\par \par Bilateral ovarian vascular flow\par \par Fluid: None.\par \par IMPRESSION:\par 1. Stable fibroid uterus with stable fibroids and stable polyps.\par \par 2. Right ovarian 2.3 cm hemorrhagic cyst. Bilateral ovarian subcentimeter follicles\par \par \par  US HEAD NECK SOFT TISSUE\par \par \par PROCEDURE DATE: 01/23/2021\par \par \par \par \par INTERPRETATION: Clinical History/Reason For Exam: sono neck-f/u REACTIVE LYMPHNODE SONO PEL /TV - F/U FIBROIDS\par \par Technique: US HEAD AND NECK SOFT TISSUE\par \par Comparison: 1/10/2020.\par \par Findings:\par Within the area of interest in the left neck indicated by patient, there is a lymph node measuring 1.25 x 0.36 x 0.75 cm; previously 1.25 x 0.39 x 0.66 cm.\par \par Impression:\par Stable left neck lymph node compared with prior examination 1/10/2020.\par \par 1/5/21\par WBC Count 6.42    K/uL  4.80 - 10.80 Final      \par  RBC Count 4.20    M/uL  4.20 - 5.40 Final      \par  Hemoglobin 12.9    g/dL  12.0 - 16.0 Final      \par  Hematocrit 40.1    %  37.0 - 47.0 Final      \par  MCV 95.5    fL  81.0 - 99.0 Final      \par  MCH 30.7    pg  27.0 - 31.0 Final      \par  MCHC 32.2    g/dL  32.0 - 37.0 Final      \par  RCDW 12.9    %  11.5 - 14.5 Final      \par  Platelet Count - Automated 327    K/uL  130 - 400 Final      \par  MPV 9.6    fL  7.4 - 10.4 Final      \par  Auto Neutrophil % 54.2    %  42.2 - 75.2 Final Differential percentages must be correlated with absolute numbers for clinical significance.    \par  Auto Lymphocyte % 30.1    %  20.5 - 51.1 Final      \par  Auto Monocyte % 10.1  High %  1.7 - 9.3 Final      \par  Auto Eosinophil % 3.9    %  0.0 - 8.0 Final      \par  Auto Basophil % 1.4  High %  0.0 - 1.0 Final      \par  Auto Immature Granulocyte % 0.3    %  0.1 - 0.3 Final      \par  Auto Neutrophil # 3.48    K/uL  1.40 - 6.50 Final    \par \par Ferritin, Serum 28  \par  \par \par  Unsaturated Iron Binding Capacity 382  High ug/dL  110 - 370 Final      \par  Total Iron Binding Capacity. 446  High ug/dL  260 - 445 Final      \par  % Saturation, Iron 14  Low %  15 - 50 Final      \par  \par   \par  \par \par \par \par \par \par  \par   \par

## 2021-05-02 NOTE — CONSULT LETTER
[Dear  ___] : Dear  [unfilled], [Consult Letter:] : I had the pleasure of evaluating your patient, [unfilled]. [Please see my note below.] : Please see my note below. [Consult Closing:] : Thank you very much for allowing me to participate in the care of this patient.  If you have any questions, please do not hesitate to contact me. [Sincerely,] : Sincerely, [DrMele  ___] : Dr. KIM [FreeTextEntry3] : Wolfgang Little MD\par Professor [employer]\par [employer]\par Attending Physician\par [facility] [place]\par [phone]\par \par

## 2021-05-02 NOTE — HISTORY OF PRESENT ILLNESS
[de-identified] : This is a 45 premenopausal woman being seen for wt loss.\par Pt has Lost 15 lbs in the last 6 mths. \par She has a good appetite.\par C/o having drenching  night sweats more often.\par She is known to have enlarged  neck Lns, saw ENt however could not get FNA as the nodes were too small.\par Had recent change in bowel habits over the last month or so with constipation and diarrhea. She has a follow up with GI.\par Has heavy periods, has fibroids, will see GYN \par She has an enlarged LNs in the groin\par colonoscopy done in 12/2/19 showed 7mm ascending colon polyp.\par EGD non erosive gastritis

## 2021-05-03 ENCOUNTER — NON-APPOINTMENT (OUTPATIENT)
Age: 46
End: 2021-05-03

## 2021-05-04 ENCOUNTER — RESULT REVIEW (OUTPATIENT)
Age: 46
End: 2021-05-04

## 2021-05-04 ENCOUNTER — OUTPATIENT (OUTPATIENT)
Dept: OUTPATIENT SERVICES | Facility: HOSPITAL | Age: 46
LOS: 1 days | Discharge: HOME | End: 2021-05-04
Payer: COMMERCIAL

## 2021-05-04 DIAGNOSIS — Z90.49 ACQUIRED ABSENCE OF OTHER SPECIFIED PARTS OF DIGESTIVE TRACT: Chronic | ICD-10-CM

## 2021-05-04 DIAGNOSIS — Z98.890 OTHER SPECIFIED POSTPROCEDURAL STATES: Chronic | ICD-10-CM

## 2021-05-04 DIAGNOSIS — M67.40 GANGLION, UNSPECIFIED SITE: Chronic | ICD-10-CM

## 2021-05-04 PROCEDURE — 70491 CT SOFT TISSUE NECK W/DYE: CPT | Mod: 26

## 2021-05-05 ENCOUNTER — OUTPATIENT (OUTPATIENT)
Dept: OUTPATIENT SERVICES | Facility: HOSPITAL | Age: 46
LOS: 1 days | Discharge: HOME | End: 2021-05-05
Payer: COMMERCIAL

## 2021-05-05 ENCOUNTER — RESULT REVIEW (OUTPATIENT)
Age: 46
End: 2021-05-05

## 2021-05-05 DIAGNOSIS — M67.40 GANGLION, UNSPECIFIED SITE: Chronic | ICD-10-CM

## 2021-05-05 DIAGNOSIS — Z90.49 ACQUIRED ABSENCE OF OTHER SPECIFIED PARTS OF DIGESTIVE TRACT: Chronic | ICD-10-CM

## 2021-05-05 DIAGNOSIS — Z98.890 OTHER SPECIFIED POSTPROCEDURAL STATES: Chronic | ICD-10-CM

## 2021-05-05 DIAGNOSIS — C50.919 MALIGNANT NEOPLASM OF UNSPECIFIED SITE OF UNSPECIFIED FEMALE BREAST: ICD-10-CM

## 2021-05-05 LAB — GLUCOSE BLDC GLUCOMTR-MCNC: 96 MG/DL — SIGNIFICANT CHANGE UP (ref 70–99)

## 2021-05-05 PROCEDURE — 78815 PET IMAGE W/CT SKULL-THIGH: CPT | Mod: 26,PI

## 2021-05-06 ENCOUNTER — NON-APPOINTMENT (OUTPATIENT)
Age: 46
End: 2021-05-06

## 2021-05-07 ENCOUNTER — OUTPATIENT (OUTPATIENT)
Dept: OUTPATIENT SERVICES | Facility: HOSPITAL | Age: 46
LOS: 1 days | Discharge: HOME | End: 2021-05-07
Payer: COMMERCIAL

## 2021-05-07 ENCOUNTER — RESULT REVIEW (OUTPATIENT)
Age: 46
End: 2021-05-07

## 2021-05-07 DIAGNOSIS — M67.40 GANGLION, UNSPECIFIED SITE: Chronic | ICD-10-CM

## 2021-05-07 DIAGNOSIS — C50.919 MALIGNANT NEOPLASM OF UNSPECIFIED SITE OF UNSPECIFIED FEMALE BREAST: ICD-10-CM

## 2021-05-07 DIAGNOSIS — Z90.49 ACQUIRED ABSENCE OF OTHER SPECIFIED PARTS OF DIGESTIVE TRACT: Chronic | ICD-10-CM

## 2021-05-07 DIAGNOSIS — Z98.890 OTHER SPECIFIED POSTPROCEDURAL STATES: Chronic | ICD-10-CM

## 2021-05-07 PROCEDURE — 77049 MRI BREAST C-+ W/CAD BI: CPT | Mod: 26

## 2021-05-11 ENCOUNTER — NON-APPOINTMENT (OUTPATIENT)
Age: 46
End: 2021-05-11

## 2021-05-11 ENCOUNTER — APPOINTMENT (OUTPATIENT)
Dept: HEMATOLOGY ONCOLOGY | Facility: CLINIC | Age: 46
End: 2021-05-11
Payer: COMMERCIAL

## 2021-05-11 ENCOUNTER — APPOINTMENT (OUTPATIENT)
Dept: BREAST CENTER | Facility: CLINIC | Age: 46
End: 2021-05-11
Payer: COMMERCIAL

## 2021-05-11 VITALS
WEIGHT: 120 LBS | BODY MASS INDEX: 18.19 KG/M2 | HEIGHT: 68 IN | TEMPERATURE: 97.1 F | SYSTOLIC BLOOD PRESSURE: 159 MMHG | DIASTOLIC BLOOD PRESSURE: 98 MMHG | RESPIRATION RATE: 14 BRPM | HEART RATE: 84 BPM

## 2021-05-11 VITALS
DIASTOLIC BLOOD PRESSURE: 98 MMHG | HEIGHT: 68 IN | WEIGHT: 120 LBS | BODY MASS INDEX: 18.19 KG/M2 | TEMPERATURE: 98 F | SYSTOLIC BLOOD PRESSURE: 159 MMHG

## 2021-05-11 DIAGNOSIS — R59.9 ENLARGED LYMPH NODES, UNSPECIFIED: ICD-10-CM

## 2021-05-11 DIAGNOSIS — Z17.0 ESTROGEN RECEPTOR POSITIVE STATUS [ER+]: ICD-10-CM

## 2021-05-11 DIAGNOSIS — R63.4 ABNORMAL WEIGHT LOSS: ICD-10-CM

## 2021-05-11 DIAGNOSIS — N63.10 UNSPECIFIED LUMP IN THE RIGHT BREAST, UNSPECIFIED QUADRANT: ICD-10-CM

## 2021-05-11 DIAGNOSIS — C50.411 MALIGNANT NEOPLASM OF UPPER-OUTER QUADRANT OF RIGHT FEMALE BREAST: ICD-10-CM

## 2021-05-11 PROCEDURE — 93702 BIS XTRACELL FLUID ANALYSIS: CPT

## 2021-05-11 PROCEDURE — 99072 ADDL SUPL MATRL&STAF TM PHE: CPT

## 2021-05-11 PROCEDURE — 99205 OFFICE O/P NEW HI 60 MIN: CPT

## 2021-05-11 PROCEDURE — 99215 OFFICE O/P EST HI 40 MIN: CPT

## 2021-05-11 NOTE — CONSULT LETTER
[Dear  ___] : Dear  [unfilled], [Consult Letter:] : I had the pleasure of evaluating your patient, [unfilled]. [Please see my note below.] : Please see my note below. [Consult Closing:] : Thank you very much for allowing me to participate in the care of this patient.  If you have any questions, please do not hesitate to contact me. [Sincerely,] : Sincerely, [DrMele  ___] : Dr. KIM [FreeTextEntry3] : Wolfgang Little MD\par Professor [employer]\par [employer]\par Attending Physician\par [facility] [place]\par [phone]\par \par

## 2021-05-11 NOTE — ASSESSMENT
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 I discussed all the imaging and pathology results at length.\par Based on above she has localized breast cancer.\par I discussed the multidisciplinary approach towards treatment of early breast cancer.\par She will be seeing a breast surgeon today. She is leaning towards mastectomy with reconstruction.\par Post surgery we will request Oncotype Dx testing and systemic treatment consisting of Hormonal therapy +/- chemotherapy will be determined based on her RS.\par Role of RT was also discussed.\par We will also send genetic testing today.\par Pt was counselled regarding the implications of positive vs neg results.\par All of her questions and concerns were addressed.\par \par Due to COVID 19, pre-visit patient instructions were explained to the patient and their symptoms were checked upon arrival. \par Masks were used by the health care providers and staff and the examination room was cleaned before and after the patient visit was completed.\par

## 2021-05-11 NOTE — HISTORY OF PRESENT ILLNESS
[de-identified] : This is a 45 premenopausal woman being seen for wt loss.\par Pt has Lost 15 lbs in the last 6 mths. \par She has a good appetite.\par C/o having drenching  night sweats more often.\par She is known to have enlarged  neck Lns, saw ENt however could not get FNA as the nodes were too small.\par Had recent change in bowel habits over the last month or so with constipation and diarrhea. She has a follow up with GI.\par Has heavy periods, has fibroids, will see GYN \par She has an enlarged LNs in the groin\par colonoscopy done in 12/2/19 showed 7mm ascending colon polyp.\par EGD non erosive gastritis [de-identified] : 5/11/21\par Pt is here for follow up.\par She is here to discuss results and further plan of care.\par She has completed breast biopsy.\par She has surgery appt today.\par She also has a GI appt later in the week

## 2021-05-12 NOTE — PAST MEDICAL HISTORY
[Menstruating] : The patient is menstruating [Menarche Age ____] : age at menarche was [unfilled] [Total Preg ___] : G[unfilled] [Live Births ___] : P[unfilled]  [FreeTextEntry6] : No. [FreeTextEntry7] : Yes; 13 years. [FreeTextEntry8] : N/A.

## 2021-05-12 NOTE — PHYSICAL EXAM
[Atraumatic] : atraumatic [Normocephalic] : normocephalic [No Supraclavicular Adenopathy] : no supraclavicular adenopathy [No Cervical Adenopathy] : no cervical adenopathy [Examined in the supine and seated position] : examined in the supine and seated position [No dominant masses] : no dominant masses left breast [No Nipple Discharge] : no left nipple discharge [No Axillary Lymphadenopathy] : no left axillary lymphadenopathy [No Rashes] : no rashes [No Ulceration] : no ulceration [Breast Nipple Inversion] : nipples not inverted [Breast Nipple Retraction] : nipples not retracted [de-identified] : Right L-Dex Score: -5.9 (05/12/2021)

## 2021-05-12 NOTE — ASSESSMENT
[FreeTextEntry1] : LADONNA is a daphnie 45 year old patient who presented today for initial consultation. \par She is status post US Guided Core Bx on 04/29/2021.\par Pathology revealed:\par Right, 10:00 N4, 2.4cm: (stop-light)\par - Invasive poorly differentiated ductal carcinoma with scattered\par single cell necrosis.\par - Ductal carcinoma in-situ (DCIS), solid type with single cell\par necrosis, high nuclear grade.\par ER  (+)  95%\par DC  (+)  50%\par HER2  (-)  1.2\par Ki-67    30%\par \par On physical exam, palpable mass noted - right breast UOQ. There are no other discrete masses in either breast or axilla.  There is no nipple discharge or inversion bilaterally.  There are no skin changes bilaterally.  \par \par We had a lengthy discussion regarding her diagnosis and treatment options.  Her pathology results were reviewed.  Her surgical options include breast conserving therapy (lumpectomy) to negative margins, or mastectomy, with or without reconstruction.  \par \par Mastectomy techniques were discussed in detail including skin-sparing and nipple-sparing.  In addition to the usual risks of mastectomy, nipple-sparing mastectomy carries potential additional risks of cancer recurrence/occurrence.  Although studied outcomes have been very good to date, there is limited long-term data available regarding recurrence/occurrence rates.  The risk of nipple loss and high likelihood of loss of nipple sensation were discussed.  When performing a nipple-sparing mastectomy, the subareolar tissue would be examined pathologically and if any cancer or abnormal cells are found, a subsequent nipple excision would be recommended.  Reconstructive options were also discussed with the risks and benefits to each.  \par Referral for consultation with Dr. Gwen Gallegos, reconstructive plastic surgeon will be arranged.\par \par The benefits and risks of the mastectomy procedure were explained to the patient, including but not limited to bleeding, infection, seroma and/or hematoma formation, pain, numbness of chest wall/underarm, inner side of upper arm, scarring, issues with wound healing, lymphedema, nerve injury, and the possibility of leaving breast cells behind.  \par \par Regardless if she chooses lumpectomy or mastectomy, she will require evaluation of her axillary lymph nodes via sentinel lymph node biopsy since her disease is invasive.  Initial SOZO measurements were taken today.  [place] node biopsy is done by injecting the periareolar breast tissue with a radioactive tracer one day prior to her surgery and removing the first few lymph nodes that drain the breast.  If the sentinel lymph node is found to have metastatic disease either on the intraoperative evaluation or on final pathology, an axillary dissection may be performed/recommended.  Risks of axillary node dissection, including lymphedema, were discussed.  There is evidence that it may not be necessary to complete an axillary node dissection if one or two sentinel lymph nodes are positive and treated by lumpectomy and conventional tangential field radiation.  \par \par We discussed that there is no difference in survival rate between lumpectomy with radiation therapy versus a mastectomy.  However, the rate of local recurrence is slightly higher with lumpectomy.  The rate of recurrence with mastectomy is not zero, and is likely closer to 4-9%.  If she proceeds with breast conservation therapy with lumpectomy, the lesion will need to be localized preoperatively with a wire placement on the day of her surgery.  .   \par \par The benefits and risks of the lumpectomy procedure were also explained to the patient, including but not limited to bleeding, infection, seroma and/or hematoma formation, pain, numbness of skin, scarring, and possible re-operation if the surgical excision demonstrates positive margins.  \par \par She is aware that she will meet with the pre-surgical department here at the hospital for pre-surgery testing.  She is also aware that she will likely need to meet with her primary care physician, and/or other medical specialists to obtain clearance for surgery, and to contact them appropriately.  \par \par If she proceeds with lumpectomy, she will most likely need adjuvant radiation therapy.  The indication for radiation therapy and common side effects were discussed.  She will be referred to radiation therapy to discuss her options if necessary.\par \par With regard to systemic therapy, the general indications for the use of chemotherapy were discussed, but is dependent on final pathology results.  Oncotype DX, a genetic assay used to predict a patient’s benefit from chemotherapy, may be sent post operatively.  Hormonal therapy with an aromatase inhibitor or Tamoxifen, may be advised if the disease is estrogen receptor positive.  Not only can it help prevent breast cancer recurrence, it can help reduce the risk of developing a new primary tumor.  This medication is usually taken for five years.  She will be referred to medical oncology post-operatively for discussion.  \par \par PLAN:\par 1. Plastic Surgery consultation to be arranged with Dr. Gwen Gallegos to discuss reconstructive options.\par 2. Genetic testing sent today from [facility] (Dr. Wolfgang Little) - will follow-up on results.\par \par Pt will contact office to finalize surgical planning.\par \par I spent a total of 60 minutes of face to face time with this patient, greater than 50% of which was spent in counseling and/or coordination of care.  All of her questions were appropriately answered.\par \par

## 2021-05-12 NOTE — REASON FOR VISIT
[Initial Evaluation] : an initial evaluation [Friend] : friend [FreeTextEntry1] : Surgical consultation.

## 2021-05-12 NOTE — REVIEW OF SYSTEMS
[As Noted in HPI] : as noted in HPI [Breast Lump] : breast lump [Negative] : Constitutional [Breast Pain] : no breast pain [Nipple Discharge] : no nipple discharge [Nipple Inverted] : no inversion of the nipple

## 2021-05-12 NOTE — DATA REVIEWED
[FreeTextEntry1] : B/L Dx Mammo & Right Sono - 04/29/2021:\par Breast composition:The breasts are extremely dense, which lowers the sensitivity of mammography.\par \par FINDINGS:\par \par There is a spiculated mass in the upper outer quadrant of the right breast with grouped heterogeneous calcifications.\par \par No suspicious mass or microcalcifications in the left breast.\par \par Right Breast Sonogram:\par \par At 10:00 o'clock, N11 position, there is an oval hypoechoic mass measuring 0.4 x 0.3 x 0.2 cm.\par \par At 10:00 o'clock, N4 position, there is an irregular hypoechoic mass measuring 2.4 x 1.7 x 1.1 cm. This corresponds in size, shape and position to the mammographic finding in the region of clinical concern.\par \par No right axillary lymphadenopathy.\par \par IMPRESSION:\par \par Irregular hypoechoic mass in the right breast is highly suspicious for malignancy\par \par Additional small oval hypoechoic mass in the right breast is suspicious\par \par BI-RADS Category 5: Highly Suggestive of Malignancy.\par \par Recommendation: Ultrasound guided biopsy x 2.\par \par \par s/p US Guided Core Bx - 04/29/2021: (stop-light)\par Right, 10:00 N4, 2.4cm:\par - Invasive poorly differentiated ductal carcinoma with scattered\par single cell necrosis.\par - Ductal carcinoma in-situ (DCIS), solid type with single cell\par necrosis, high nuclear grade.\par ER  (+)  95%\par MD  (+)  50%\par HER2  (-)  1.2\par Ki-67    30%\par \par Right, 10:00 N11, 0.4cm: (mini-cork)\par - Benign fibrofatty breast tissue and skeletal muscle with\par dominant macrocyst wall fragments associated with an attenuated\par epithelial lining and reactive wall fibrosis consistent with a dilated duct.\par \par CT Scan - Chest / Abdomen / Pelvis - 05/02/2021:\par IMPRESSION:\par 1. Right breast masses with biopsy clips correlating with known breast malignancy.\par 2. Bilateral lower lobe 0.2 cm nodules, indeterminate.\par 3. Gallbladder wall 0.5 cm nodule, possibly polyp. Consider correlation with right upper quadrant sonogram as clinically warranted.\par \par CT Scan - Soft Tissue Neck - 05/04/2021:\par IMPRESSION:\par No CT evidence of a neck mass or pathologic lymphadenopathy.\par \par Whole Body PET/CT Scan - 05/05/2021:\par IMPRESSION:\par Pathologic FDG uptake (SUV 16.1) coregistering with right breast mass (biopsy-proven carcinoma).\par Nonspecific small focal FDG uptake (SUV 3.6) which appears to coregister within the left thyroid lobe.\par No additional definite sites of abnormal FDG uptake.\par \par B/L Breast MRI - 05/07/2021:\par Findings:\par Amount of fibroglandular tissue: Extreme fibroglandular tissue\par Background parenchymal enhancement: Mild, Symmetric\par RIGHT BREAST:\par There is redemonstration of an enhancing biopsy-proven malignant mass in the superior right breast measuring 3.0 x 2.0 x 2.0 cm. Also noted is area of benign biopsy in the lateral right breast. No additional area of abnormal enhancement.\par The is no evidence of skin thickening or nipple retraction.  There is no axillary adenopathy.\par LEFT BREAST:\par There is no suspicious area of enhancement in the left breast.\par The is no evidence of skin thickening or nipple retraction.  There is no axillary adenopathy.\par The imaged portions of the chest and abdomen are unremarkable.\par Impression:\par 1. Area of biopsy-proven enhancing malignant mass in the right breast. No additional area of abnormal enhancement in the right breast.\par 2. No MRI evidence of malignancy in the left breast.\par Recommendation: Management as clinically appropriate.\par BI-RADS Category 6: Known Biopsy-Proven Malignancy

## 2021-05-12 NOTE — HISTORY OF PRESENT ILLNESS
[FreeTextEntry1] : PCP: Dr. Elena Yuan\par \par s/p US Guided Core Bx - 04/29/2021: (stop-light)\par Right, 10:00 N4, 2.4cm:\par - Invasive poorly differentiated ductal carcinoma with scattered\par single cell necrosis.\par - Ductal carcinoma in-situ (DCIS), solid type with single cell\par necrosis, high nuclear grade.\par ER  (+)  95%\par ME  (+)  50%\par HER2  (-)  1.2\par Ki-67    30%\par \par Right, 10:00 N11, 0.4cm: (mini-cork)\par - Benign fibrofatty breast tissue and skeletal muscle with\par dominant macrocyst wall fragments associated with an attenuated\par epithelial lining and reactive wall fibrosis consistent with a dilated duct.\par \par \par Pt has a palpable finding in the right breast.\par She denies any breast pain, nipple discharge or inversion and /or skin changes.\par \par (-) family hx - breast / ovarian cancer.\par

## 2021-05-14 ENCOUNTER — APPOINTMENT (OUTPATIENT)
Dept: PLASTIC SURGERY | Facility: CLINIC | Age: 46
End: 2021-05-14
Payer: COMMERCIAL

## 2021-05-14 VITALS — WEIGHT: 118 LBS | BODY MASS INDEX: 18.96 KG/M2 | HEIGHT: 66 IN

## 2021-05-14 PROCEDURE — 99204 OFFICE O/P NEW MOD 45 MIN: CPT

## 2021-05-14 PROCEDURE — 99072 ADDL SUPL MATRL&STAF TM PHE: CPT

## 2021-05-14 NOTE — HISTORY OF PRESENT ILLNESS
[FreeTextEntry1] : 46 yo   F with PMHx of anxiety/depression, HTN, GERD, asthma who was recently dx with RIGHT invasive ductal carcinoma and DCIS 2.4 cm @ 10:00 (ER+/KS+/HER2(-)) after palpable mass in right breast.  Denies breast pain, nipple discharge and retraction.  Here with friend, Rukhsana.\par \par Patient is learning toward bilateral mastectomy.  She is inquiring about immediate pre-pec implant based reconstruction.  She would like simplest recovery. 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  She would like a full B in reconstructive volume.\par Recent lost weight 8 lbs. currently 118 lbs.  Mother passed away recently 2 months ago.

## 2021-05-14 NOTE — PHYSICAL EXAM
[Bra Size: _______] : Bra Size: [unfilled] [de-identified] : well-appearing, NAD [de-identified] : NCAT [de-identified] : EOMI [de-identified] : good  [de-identified] : Mild scoliosis\par Prominent right costal chest vs left [de-identified] : Bilateral breasts mild asymmetry,  right breast volume 20-50 g greater than left breast.  Bilateral superior pole deflation.  Skin tone moderate, good skin elasticity.\par Left breast: no palpable masses, nipple retraction or discharge.\par Right breast: UOQ 3 cm palpable mass; no nipple retraction or discharge.\par Axilla: **no** clinically palpable lymph adenopathy bilateral\par Chest: no trunk deformities\par Palpable LD muscle with moderate minimal soft tissue bulk, bilateral\par \par Breast measurements (standing; (cm)):\par Regnault ptosis grade: n/a\par R SN-Nipple: 21\par R Nipple-IMF: 6.5\par R Base width: 11.5\par R Nipple - midsternum: 9\par R NAC diameter: 3.8\par \par Right slighty greater than left\par \par IMF position asymmetrical, right 1 cm lower than left breast\par \par L SN-Nipple: 21\par L Nipple-IMF: 6\par L Base width: 12\par L Nipple - midsternum: 9\par L NAC diameter: 3.8 [de-identified] : thin, NT, ND, minimal soft tissue bulk infraumbilical, well-healed lap appy scars [de-identified] : FROM x 4

## 2021-05-14 NOTE — ASSESSMENT
[FreeTextEntry1] : 46 yo F with right breast cancer who desire BL immediate breast reconstruction after BL mastectomy (NSM)\par \par She is a good candidate for immediate pre-pectoralis placement of tissue expander with possible mesh via NSM IMF approach, followed by silicone implant at a later date\par \par The patient was counseled about reconstructive options following mastectomy, including autologous, prosthetic, and the options of foregoing reconstruction. Additionally, she was explained the options regarding the timing of reconstruction, including immediate reconstruction at the time of mastectomy or delayed reconstruction at a later date. \par \par The patient was extensively counseled on the operation and the perioperative recoveries of both autologous and prosthetic reconstructions. The patient understands the likely position of scars and the use of surgical drains. The patient understands the risks with abdominally based microsurgical reconstruction including partial or total flap loss, revisit to the operating room in the keisha-operative period, wound dehiscence, mastectomy skin flap necrosis, fat necrosis, abdominal wall morbidity (laxity, bulge, hernia), asymmetry, paresthesia, seroma, hematoma, and infection. She also understands the risks with implant-based reconstruction include but not limited to hematoma, seroma, infection, implant malposition, extrusion, deflation, capsular contracture, mastectomy skin flap necrosis, wound dehiscence, asymmetry, paresthesia, small risk of breast-implant associated lymphoma,  likelihood of requiring additional procedures related to the implant over the course of her lifetime, possible need for additional surgery including revision and/or autologous tissue reconstruction (e.g. pedicled latissimus dorsi flap, TDAP flap, etc).  She was also informed on the off-label use of biologic mesh, its benefits, risks and alternatives. She was given the opportunity to ask questions; and all were answered to her satisfaction at this time.\par \par Reviewed option of direct to implant vs staged tissue expander to implant.  The benefits, risks and alternatives were reviewed.\par \par She is a good candidate for prosthetic breast reconstruction. After a long discussion she has decided to proceed with staged prosthetic breast reconstruction with immediate placement of tissue expander with mesh, followed by silicone implant at a later date. \par \par The patient understands all of these risks and she provides informed consent.\par \par Photos were taken.\par \par Her surgery date with be coordinated by [facility] Pl[facility].\par

## 2021-05-17 ENCOUNTER — NON-APPOINTMENT (OUTPATIENT)
Age: 46
End: 2021-05-17

## 2021-05-26 ENCOUNTER — APPOINTMENT (OUTPATIENT)
Dept: CARDIOLOGY | Facility: CLINIC | Age: 46
End: 2021-05-26
Payer: COMMERCIAL

## 2021-05-26 VITALS
WEIGHT: 121 LBS | HEIGHT: 66 IN | DIASTOLIC BLOOD PRESSURE: 95 MMHG | BODY MASS INDEX: 19.44 KG/M2 | SYSTOLIC BLOOD PRESSURE: 144 MMHG | TEMPERATURE: 97.3 F

## 2021-05-26 VITALS — SYSTOLIC BLOOD PRESSURE: 130 MMHG | DIASTOLIC BLOOD PRESSURE: 84 MMHG

## 2021-05-26 PROCEDURE — 99204 OFFICE O/P NEW MOD 45 MIN: CPT

## 2021-05-26 PROCEDURE — 93000 ELECTROCARDIOGRAM COMPLETE: CPT

## 2021-05-26 PROCEDURE — 99072 ADDL SUPL MATRL&STAF TM PHE: CPT

## 2021-05-27 ENCOUNTER — NON-APPOINTMENT (OUTPATIENT)
Age: 46
End: 2021-05-27

## 2021-06-01 ENCOUNTER — OUTPATIENT (OUTPATIENT)
Dept: OUTPATIENT SERVICES | Facility: HOSPITAL | Age: 46
LOS: 1 days | Discharge: HOME | End: 2021-06-01

## 2021-06-01 VITALS
HEIGHT: 66 IN | HEART RATE: 84 BPM | SYSTOLIC BLOOD PRESSURE: 136 MMHG | TEMPERATURE: 98 F | DIASTOLIC BLOOD PRESSURE: 82 MMHG | WEIGHT: 117.95 LBS | OXYGEN SATURATION: 98 % | RESPIRATION RATE: 16 BRPM

## 2021-06-01 DIAGNOSIS — Z90.49 ACQUIRED ABSENCE OF OTHER SPECIFIED PARTS OF DIGESTIVE TRACT: Chronic | ICD-10-CM

## 2021-06-01 DIAGNOSIS — Z01.818 ENCOUNTER FOR OTHER PREPROCEDURAL EXAMINATION: ICD-10-CM

## 2021-06-01 DIAGNOSIS — Z98.890 OTHER SPECIFIED POSTPROCEDURAL STATES: Chronic | ICD-10-CM

## 2021-06-01 DIAGNOSIS — M67.40 GANGLION, UNSPECIFIED SITE: Chronic | ICD-10-CM

## 2021-06-01 LAB
APPEARANCE UR: CLEAR — SIGNIFICANT CHANGE UP
APTT BLD: 30.3 SEC — SIGNIFICANT CHANGE UP (ref 27–39.2)
BILIRUB UR-MCNC: NEGATIVE — SIGNIFICANT CHANGE UP
BLD GP AB SCN SERPL QL: SIGNIFICANT CHANGE UP
COLOR SPEC: YELLOW — SIGNIFICANT CHANGE UP
DIFF PNL FLD: NEGATIVE — SIGNIFICANT CHANGE UP
GLUCOSE UR QL: NEGATIVE — SIGNIFICANT CHANGE UP
INR BLD: 0.87 RATIO — SIGNIFICANT CHANGE UP (ref 0.65–1.3)
KETONES UR-MCNC: NEGATIVE — SIGNIFICANT CHANGE UP
LEUKOCYTE ESTERASE UR-ACNC: NEGATIVE — SIGNIFICANT CHANGE UP
NITRITE UR-MCNC: NEGATIVE — SIGNIFICANT CHANGE UP
PH UR: 6 — SIGNIFICANT CHANGE UP (ref 5–8)
PROT UR-MCNC: SIGNIFICANT CHANGE UP
PROTHROM AB SERPL-ACNC: 10 SEC — SIGNIFICANT CHANGE UP (ref 9.95–12.87)
SP GR SPEC: 1.03 — HIGH (ref 1.01–1.03)
UROBILINOGEN FLD QL: SIGNIFICANT CHANGE UP

## 2021-06-01 NOTE — H&P PST ADULT - AIRWAY
Home Care Instructions                                                                                                                Yasemin Bradley   MRN: [id number]    Department:  [facility], Emergency Dept   Date of Visit:  3/3/2017            [facility], Emergency Dept    [address]    [place] [address]    Phone:  [phone]      You were seen by     Rudolph Mosley M.D.      Your Diagnosis Was     Facial numbness     R20.0       Follow-up Information     1. Follow up with [facility] In 1 day.    Specialty:  Family Medicine    Contact information    [address]  [address]  [place] [address] [phone]        Medication Information     Review all of your home medications and newly ordered medications with your primary doctor and/or pharmacist as soon as possible. Follow medication instructions as directed by your doctor and/or pharmacist.     Please keep your complete medication list with you and share with your physician. Update the information when medications are discontinued, doses are changed, or new medications (including over-the-counter products) are added; and carry medication information at all times in the event of emergency situations.               Medication List      ASK your doctor about these medications        Instructions    famotidine 20 MG Tabs   Commonly known as:  PEPCID    Take 20 mg by mouth every day.   Dose:  20 mg       hydrocodone-acetaminophen 5-325 MG Tabs per tablet   Commonly known as:  NORCO    Take 1 Tab by mouth every 8 hours as needed.   Dose:  1 Tab       ibuprofen 800 MG Tabs   Commonly known as:  MOTRIN    Take 800 mg by mouth BID 2 DAYS A WEEK. Indications: back pain   Dose:  800 mg       pantoprazole 20 MG tablet   Commonly known as:  PROTONIX    Take 1 Tab by mouth every day.   Dose:  20 mg               Procedures and tests performed during your visit     BASIC METABOLIC PANEL    CBC WITH DIFFERENTIAL    ESTIMATED  GFR        Discharge Instructions       Return immediately to the Emergency Department if you experience continuing or worsening symptoms or if you develop any new or worsening symptoms including but not limited to fever, chest pain, difficulty breathing, any numbness/weakness/tingling, abdominal pain or any other concerning symptoms.    Normal Exam, Adult  You were seen and examined today in our facility. Our caregiver found nothing wrong on the exam. If testing was done such as lab work or x-rays, they did not indicate enough wrong to suggest that treatment should be given. The caregiver then must decide after testing is finished if your concern is a physical problem or illness that needs treatment. Today no treatable problem was found. Even if reassurance was given, if you feel you are getting worse when you get home make sure you call back or return to our department.  For the protection of your privacy, test results can not be given over the phone. Make sure you receive the results of your test. Ask as to how these results are to be obtained if you have not been informed. It is your responsibility to obtain your test results.  Your condition can change over time. Sometimes it takes more than one visit to determine the cause of your symptoms. It is important that you monitor your condition for any changes.  SEEK IMMEDIATE MEDICAL CARE IF:  · You develop an oral temperature above 102° F (38.9° C), which lasts for more than 2 days, not controlled by medications. Only take over-the-counter or prescription medicines for pain, discomfort, or fever as directed by your caregiver.   · You develop a loss of appetite or start throwing up (vomiting).   · You develop a rash, cough, belly (abdominal) pain, earache, headache, or develop pain in neck, muscles, or joints.   · The problem or problems which brought you to our facility become worse or are a cause of more concern.   If we have told you today your exam and tests are  normal, and a short while later you feel this is not right, please seek medical attention so you may be rechecked.  Document Released: 04/01/2002 Document Revised: 03/11/2013 Document Reviewed: 07/24/2009  Exit[employer]® Patient Information ©2013 Sample[employer].          Patient Information     Patient Information    Following emergency treatment: all patient requiring follow-up care must return either to a private physician or a clinic if your condition worsens before you are able to obtain further medical attention, please return to the emergency room.     Billing Information    At [facility], we work to make the billing process streamlined for our patients.  Our Representatives are here to answer any questions you may have regarding your hospital bill.  If you have insurance coverage and have supplied your insurance information to us, we will submit a claim to your insurer on your behalf.  Should you have any questions regarding your bill, we can be reached online or by phone as follows:  Online: You are able pay your bills online or live chat with our representatives about any billing questions you may have. We are here to help Monday - Friday from 8:00am to 7:30pm and 9:00am - 12:00pm on Saturdays.  Please visit [email or web address]  for more information.   Phone:  [phone] or [phone]    Please note that your emergency physician, surgeon, pathologist, radiologist, anesthesiologist, and other specialists are not employed by [facility] and will therefore bill separately for their services.  Please contact them directly for any questions concerning their bills at the numbers below:     Emergency Physician Services:  [phone]  [facility]:  [phone]  [facility]:  [phone]  [facility]:  [phone]    1. Your final bill may vary from the amount quoted upon discharge if all procedures are not complete at that time, or  if your doctor has additional procedures of which we are not aware. You will receive an additional bill if you return to the Emergency Department at [facility] for suture removal regardless of the facility of which the sutures were placed.     2. Please arrange for settlement of this account at the emergency registration.    3. All self-pay accounts are due in full at the time of treatment.  If you are unable to meet this obligation then payment is expected within 4-5 days.     4. If you have had radiology studies (CT, X-ray, Ultrasound, MRI), you have received a preliminary result during your emergency department visit. Please contact the radiology department [phone] to receive a copy of your final result. Please discuss the Final result with your primary physician or with the follow up physician provided.     Crisis Hotline:  Center Crisis Hotline:  1-278-LOLKSPL or 1-816.269.2133  [place] Crisis Hotline:    1-205.408.6601 or 681-464-1976         ED Discharge Follow Up Questions    1. In order to provide you with very good care, we would like to follow up with a phone call in the next few days.  May we have your permission to contact you?     YES /  NO    2. What is the best phone number to call you? (       )_____-__________    3. What is the best time to call you?      Morning  /  Afternoon  /  Evening                   Patient Signature:  ____________________________________________________________    Date:  ____________________________________________________________                normal

## 2021-06-01 NOTE — H&P PST ADULT - NSICDXPASTMEDICALHX_GEN_ALL_CORE_FT
PAST MEDICAL HISTORY:  Anxiety     HTN (hypertension)      PAST MEDICAL HISTORY:  Anxiety     H/O thyroid nodule f/u with endo- 'insignificant at present'    HTN (hypertension)      PAST MEDICAL HISTORY:  Anxiety     H/O thyroid nodule f/u with endo- 'insignificant at present'    HTN (hypertension)     Lung nodule ON CT

## 2021-06-01 NOTE — H&P PST ADULT - HISTORY OF PRESENT ILLNESS
44 Y/O FEMALE PRESENTS TO PAST WITH HX RIGHT BREAST CA DX 4/2021   PT NOW FOR SCHEDULED PROCEDURE. PT DENIES ANY CP SOB PALP COUGH DYSURIA FEVER URI. PT ABLE TO DARYL 1-2 FOS W/O SOB   pt denies any covid s/s, or tested positive in the past  pt advised self quarantine till day of procedure  Anesthesia Alert  NO--Difficult Airway  NO--History of neck surgery or radiation  NO--Limited ROM of neck  NO--History of Malignant hyperthermia  NO--Personal or family history of Pseudocholinesterase deficiency.  NO--Prior Anesthesia Complication  NO--Latex Allergy  NO--Loose teeth  NO--History of Rheumatoid Arthritis  NO--MOHSEN  NO--Bleeding risk  NO--Other_____   44 Y/O FEMALE PRESENTS TO PAST WITH HX RIGHT BREAST CA DX 4/2021   PT NOW FOR SCHEDULED PROCEDURE ( B/L IMMEDIATE PRE-PECTORAL TISSUE EXPANDERS , RECONSTRUCTION, POSSIBLE ALLODERM ) . PT DENIES ANY CP SOB PALP COUGH DYSURIA FEVER URI. PT ABLE TO DARYL 1-2 FOS W/O SOB   pt denies any covid s/s, or tested positive in the past  pt advised self quarantine till day of procedure  Anesthesia Alert  NO--Difficult Airway  NO--History of neck surgery or radiation  NO--Limited ROM of neck  NO--History of Malignant hyperthermia  NO--Personal or family history of Pseudocholinesterase deficiency.  NO--Prior Anesthesia Complication  NO--Latex Allergy  NO--Loose teeth  NO--History of Rheumatoid Arthritis  NO--MOHSEN  NO--Bleeding risk  NO--Other_____

## 2021-06-01 NOTE — H&P PST ADULT - NSICDXPASTSURGICALHX_GEN_ALL_CORE_FT
PAST SURGICAL HISTORY:  Ganglion cyst     H/O colonoscopy 2014    History of appendectomy     History of D&C

## 2021-06-01 NOTE — H&P PST ADULT - NSICDXFAMILYHX_GEN_ALL_CORE_FT
FAMILY HISTORY:  Father  Still living? Unknown  FH: HTN (hypertension), Age at diagnosis: Age Unknown  FH: lung cancer, Age at diagnosis: Age Unknown

## 2021-06-02 ENCOUNTER — NON-APPOINTMENT (OUTPATIENT)
Age: 46
End: 2021-06-02

## 2021-06-02 LAB
CULTURE RESULTS: SIGNIFICANT CHANGE UP
SPECIMEN SOURCE: SIGNIFICANT CHANGE UP

## 2021-06-03 ENCOUNTER — APPOINTMENT (OUTPATIENT)
Dept: CARDIOLOGY | Facility: CLINIC | Age: 46
End: 2021-06-03
Payer: COMMERCIAL

## 2021-06-03 PROBLEM — F41.9 ANXIETY DISORDER, UNSPECIFIED: Chronic | Status: ACTIVE | Noted: 2021-06-01

## 2021-06-03 PROBLEM — R91.1 SOLITARY PULMONARY NODULE: Chronic | Status: ACTIVE | Noted: 2021-06-01

## 2021-06-03 PROBLEM — Z86.39 PERSONAL HISTORY OF OTHER ENDOCRINE, NUTRITIONAL AND METABOLIC DISEASE: Chronic | Status: ACTIVE | Noted: 2021-06-01

## 2021-06-03 PROCEDURE — 93306 TTE W/DOPPLER COMPLETE: CPT

## 2021-06-03 PROCEDURE — 99072 ADDL SUPL MATRL&STAF TM PHE: CPT

## 2021-06-04 ENCOUNTER — APPOINTMENT (OUTPATIENT)
Dept: CARDIOLOGY | Facility: CLINIC | Age: 46
End: 2021-06-04
Payer: COMMERCIAL

## 2021-06-04 PROCEDURE — 99072 ADDL SUPL MATRL&STAF TM PHE: CPT

## 2021-06-04 PROCEDURE — 93015 CV STRESS TEST SUPVJ I&R: CPT

## 2021-06-07 DIAGNOSIS — G89.18 OTHER ACUTE POSTPROCEDURAL PAIN: ICD-10-CM

## 2021-06-08 DIAGNOSIS — C50.919 MALIGNANT NEOPLASM OF UNSPECIFIED SITE OF UNSPECIFIED FEMALE BREAST: ICD-10-CM

## 2021-06-08 RX ORDER — CELECOXIB 400 MG/1
400 CAPSULE ORAL
Qty: 11 | Refills: 0 | Status: DISCONTINUED | COMMUNITY
Start: 2021-06-07 | End: 2021-06-08

## 2021-06-09 ENCOUNTER — APPOINTMENT (OUTPATIENT)
Dept: CARDIOLOGY | Facility: CLINIC | Age: 46
End: 2021-06-09
Payer: COMMERCIAL

## 2021-06-09 DIAGNOSIS — Z01.810 ENCOUNTER FOR PREPROCEDURAL CARDIOVASCULAR EXAMINATION: ICD-10-CM

## 2021-06-09 PROCEDURE — 93351 STRESS TTE COMPLETE: CPT

## 2021-06-09 PROCEDURE — 99072 ADDL SUPL MATRL&STAF TM PHE: CPT

## 2021-06-11 NOTE — ADDENDUM
[FreeTextEntry1] : The patient had a stress echo which she had completed 7 minutes and 31 seconds  No echo ischemia . Echocardiogram showed normal LV systolic function  trace MR and trace TR . The patient should be considered an intermediate cardiac risk undergoing an intermediate risk procedure. The patient should take her Metoprolol on the morning of the procedure.

## 2021-06-11 NOTE — HISTORY OF PRESENT ILLNESS
[FreeTextEntry1] : The patient has a history of having Breast CA . She is going for bilateral mastectomy .The patient has a history of having HTN .  The patient  has had HTN which was recently diagnosed . Her Mother had  in 3-2021 . She had endocarditis from orthopedic hardware in her neck . The patient has had no chest pain SOB . The patient overall has good exercise tolerance . . She has occasional palpitations . The patient is going for bilateral mastectomies in Jerri 15 th . . This may be followed by eventual breast reconstruction .

## 2021-06-11 NOTE — ASSESSMENT
[FreeTextEntry1] : The patient is going for bilateral mastectomies . The patient has had issues with baseline tachycardia , Things have improved with beta blockers . She has had mild HTN which she has been treated with Metoprolol which also improves her HR  The patient has been tried on Losartan and Lisinopril and Amlodipine with variable results . The patient is going for bilateral  mastectomies and tissue expanders .

## 2021-06-11 NOTE — REASON FOR VISIT
"Anesthesia Transfer of Care Note    Patient: Ching Bruce    Procedure(s) Performed: Procedure(s) (LRB):  ERCP (ENDOSCOPIC RETROGRADE CHOLANGIOPANCREATOGRAPHY), stent exchange (N/A)    Patient location: PACU    Anesthesia Type: general    Transport from OR: Transported from OR on 6-10 L/min O2 by face mask with adequate spontaneous ventilation    Post pain: adequate analgesia    Post assessment: no apparent anesthetic complications and tolerated procedure well    Post vital signs: stable    Level of consciousness: awake, alert and oriented    Nausea/Vomiting: no nausea/vomiting    Complications: none    Transfer of care protocol was followed      Last vitals:   Visit Vitals  /67   Pulse 72   Temp 37.1 °C (98.7 °F)   Resp 18   Ht 5' 7" (1.702 m)   Wt 72.6 kg (160 lb)   SpO2 99%   Breastfeeding? No   BMI 25.06 kg/m²     " [Hypertension] : hypertension [Other: ____] : [unfilled] [FreeTextEntry3] : Antonio

## 2021-06-11 NOTE — REVIEW OF SYSTEMS
[Palpitations] : palpitations [Joint Pain] : joint pain [Anxiety] : anxiety [Negative] : Heme/Lymph [SOB] : no shortness of breath [Dyspnea on exertion] : not dyspnea during exertion [Chest Discomfort] : no chest discomfort

## 2021-06-12 ENCOUNTER — OUTPATIENT (OUTPATIENT)
Dept: OUTPATIENT SERVICES | Facility: HOSPITAL | Age: 46
LOS: 1 days | Discharge: HOME | End: 2021-06-12

## 2021-06-12 ENCOUNTER — LABORATORY RESULT (OUTPATIENT)
Age: 46
End: 2021-06-12

## 2021-06-12 DIAGNOSIS — M67.40 GANGLION, UNSPECIFIED SITE: Chronic | ICD-10-CM

## 2021-06-12 DIAGNOSIS — Z98.890 OTHER SPECIFIED POSTPROCEDURAL STATES: Chronic | ICD-10-CM

## 2021-06-12 DIAGNOSIS — Z90.49 ACQUIRED ABSENCE OF OTHER SPECIFIED PARTS OF DIGESTIVE TRACT: Chronic | ICD-10-CM

## 2021-06-12 DIAGNOSIS — Z11.59 ENCOUNTER FOR SCREENING FOR OTHER VIRAL DISEASES: ICD-10-CM

## 2021-06-14 ENCOUNTER — RESULT REVIEW (OUTPATIENT)
Age: 46
End: 2021-06-14

## 2021-06-14 ENCOUNTER — OUTPATIENT (OUTPATIENT)
Dept: OUTPATIENT SERVICES | Facility: HOSPITAL | Age: 46
LOS: 1 days | Discharge: HOME | End: 2021-06-14
Payer: COMMERCIAL

## 2021-06-14 DIAGNOSIS — C50.919 MALIGNANT NEOPLASM OF UNSPECIFIED SITE OF UNSPECIFIED FEMALE BREAST: ICD-10-CM

## 2021-06-14 DIAGNOSIS — M67.40 GANGLION, UNSPECIFIED SITE: Chronic | ICD-10-CM

## 2021-06-14 DIAGNOSIS — Z98.890 OTHER SPECIFIED POSTPROCEDURAL STATES: Chronic | ICD-10-CM

## 2021-06-14 DIAGNOSIS — Z90.49 ACQUIRED ABSENCE OF OTHER SPECIFIED PARTS OF DIGESTIVE TRACT: Chronic | ICD-10-CM

## 2021-06-14 PROCEDURE — 78195 LYMPH SYSTEM IMAGING: CPT | Mod: 26

## 2021-06-14 NOTE — ASU PATIENT PROFILE, ADULT - PMH
Anxiety    H/O thyroid nodule  f/u with endo- 'insignificant at present'  HTN (hypertension)    Lung nodule  ON CT

## 2021-06-15 ENCOUNTER — OUTPATIENT (OUTPATIENT)
Dept: OUTPATIENT SERVICES | Facility: HOSPITAL | Age: 46
LOS: 1 days | Discharge: HOME | End: 2021-06-15
Payer: COMMERCIAL

## 2021-06-15 ENCOUNTER — APPOINTMENT (OUTPATIENT)
Dept: PLASTIC SURGERY | Facility: AMBULATORY SURGERY CENTER | Age: 46
End: 2021-06-15
Payer: COMMERCIAL

## 2021-06-15 ENCOUNTER — APPOINTMENT (OUTPATIENT)
Dept: BREAST CENTER | Facility: HOSPITAL | Age: 46
End: 2021-06-15
Payer: COMMERCIAL

## 2021-06-15 ENCOUNTER — RESULT REVIEW (OUTPATIENT)
Age: 46
End: 2021-06-15

## 2021-06-15 VITALS
SYSTOLIC BLOOD PRESSURE: 120 MMHG | DIASTOLIC BLOOD PRESSURE: 72 MMHG | RESPIRATION RATE: 22 BRPM | OXYGEN SATURATION: 100 % | HEART RATE: 92 BPM

## 2021-06-15 VITALS
SYSTOLIC BLOOD PRESSURE: 143 MMHG | RESPIRATION RATE: 20 BRPM | HEART RATE: 71 BPM | TEMPERATURE: 98 F | HEIGHT: 66 IN | OXYGEN SATURATION: 100 % | WEIGHT: 117.95 LBS | DIASTOLIC BLOOD PRESSURE: 89 MMHG

## 2021-06-15 DIAGNOSIS — M67.40 GANGLION, UNSPECIFIED SITE: Chronic | ICD-10-CM

## 2021-06-15 DIAGNOSIS — Z90.49 ACQUIRED ABSENCE OF OTHER SPECIFIED PARTS OF DIGESTIVE TRACT: Chronic | ICD-10-CM

## 2021-06-15 DIAGNOSIS — Z98.890 OTHER SPECIFIED POSTPROCEDURAL STATES: Chronic | ICD-10-CM

## 2021-06-15 PROCEDURE — 38900 IO MAP OF SENT LYMPH NODE: CPT | Mod: 50

## 2021-06-15 PROCEDURE — 19303 MAST SIMPLE COMPLETE: CPT | Mod: 50

## 2021-06-15 PROCEDURE — 88307 TISSUE EXAM BY PATHOLOGIST: CPT | Mod: 26

## 2021-06-15 PROCEDURE — 19357 TISS XPNDR PLMT BRST RCNSTJ: CPT | Mod: 50

## 2021-06-15 PROCEDURE — 15777 ACELLULAR DERM MATRIX IMPLT: CPT | Mod: 50

## 2021-06-15 PROCEDURE — 88305 TISSUE EXAM BY PATHOLOGIST: CPT | Mod: 26

## 2021-06-15 PROCEDURE — 88341 IMHCHEM/IMCYTCHM EA ADD ANTB: CPT | Mod: 26

## 2021-06-15 PROCEDURE — 38745 REMOVE ARMPIT LYMPH NODES: CPT | Mod: RT

## 2021-06-15 PROCEDURE — 38525 BIOPSY/REMOVAL LYMPH NODES: CPT | Mod: 50,59

## 2021-06-15 PROCEDURE — 71045 X-RAY EXAM CHEST 1 VIEW: CPT | Mod: 26

## 2021-06-15 PROCEDURE — 88342 IMHCHEM/IMCYTCHM 1ST ANTB: CPT | Mod: 26

## 2021-06-15 RX ORDER — OXYCODONE AND ACETAMINOPHEN 5; 325 MG/1; MG/1
2 TABLET ORAL ONCE
Refills: 0 | Status: DISCONTINUED | OUTPATIENT
Start: 2021-06-15 | End: 2021-06-15

## 2021-06-15 RX ORDER — APREPITANT 80 MG/1
40 CAPSULE ORAL ONCE
Refills: 0 | Status: COMPLETED | OUTPATIENT
Start: 2021-06-15 | End: 2021-06-15

## 2021-06-15 RX ORDER — HYDROMORPHONE HYDROCHLORIDE 2 MG/ML
1 INJECTION INTRAMUSCULAR; INTRAVENOUS; SUBCUTANEOUS
Refills: 0 | Status: DISCONTINUED | OUTPATIENT
Start: 2021-06-15 | End: 2021-06-15

## 2021-06-15 RX ORDER — ONDANSETRON 8 MG/1
1 TABLET, FILM COATED ORAL
Qty: 9 | Refills: 0
Start: 2021-06-15 | End: 2021-06-17

## 2021-06-15 RX ORDER — HYDROMORPHONE HYDROCHLORIDE 2 MG/ML
0.5 INJECTION INTRAMUSCULAR; INTRAVENOUS; SUBCUTANEOUS
Refills: 0 | Status: DISCONTINUED | OUTPATIENT
Start: 2021-06-15 | End: 2021-06-15

## 2021-06-15 RX ORDER — TRAMADOL HYDROCHLORIDE 50 MG/1
1 TABLET ORAL
Qty: 20 | Refills: 0
Start: 2021-06-15

## 2021-06-15 RX ORDER — ONDANSETRON 8 MG/1
4 TABLET, FILM COATED ORAL ONCE
Refills: 0 | Status: DISCONTINUED | OUTPATIENT
Start: 2021-06-15 | End: 2021-06-29

## 2021-06-15 RX ORDER — LISINOPRIL 2.5 MG/1
1 TABLET ORAL
Qty: 0 | Refills: 0 | DISCHARGE

## 2021-06-15 RX ORDER — SODIUM CHLORIDE 9 MG/ML
1000 INJECTION, SOLUTION INTRAVENOUS
Refills: 0 | Status: DISCONTINUED | OUTPATIENT
Start: 2021-06-15 | End: 2021-06-29

## 2021-06-15 RX ORDER — MEPERIDINE HYDROCHLORIDE 50 MG/ML
12.5 INJECTION INTRAMUSCULAR; INTRAVENOUS; SUBCUTANEOUS ONCE
Refills: 0 | Status: DISCONTINUED | OUTPATIENT
Start: 2021-06-15 | End: 2021-06-15

## 2021-06-15 RX ORDER — HEPARIN SODIUM 5000 [USP'U]/ML
5000 INJECTION INTRAVENOUS; SUBCUTANEOUS ONCE
Refills: 0 | Status: COMPLETED | OUTPATIENT
Start: 2021-06-15 | End: 2021-06-15

## 2021-06-15 RX ORDER — ASCORBIC ACID 60 MG
5000 TABLET,CHEWABLE ORAL
Qty: 0 | Refills: 0 | DISCHARGE

## 2021-06-15 RX ADMIN — HEPARIN SODIUM 5000 UNIT(S): 5000 INJECTION INTRAVENOUS; SUBCUTANEOUS at 08:21

## 2021-06-15 RX ADMIN — APREPITANT 40 MILLIGRAM(S): 80 CAPSULE ORAL at 08:19

## 2021-06-15 RX ADMIN — HYDROMORPHONE HYDROCHLORIDE 0.5 MILLIGRAM(S): 2 INJECTION INTRAMUSCULAR; INTRAVENOUS; SUBCUTANEOUS at 14:07

## 2021-06-15 RX ADMIN — SODIUM CHLORIDE 100 MILLILITER(S): 9 INJECTION, SOLUTION INTRAVENOUS at 14:23

## 2021-06-15 NOTE — ASU DISCHARGE PLAN (ADULT/PEDIATRIC) - ASU DC SPECIAL INSTRUCTIONSFT
Diet: You may resume your usual diet. Avoid alcohol and excessive salt intake for two weeks following surgery. This will help to minimize swelling.    Medication: Continue Gabapentin twice daily until complete. Take Tylenol 650mg every 6 hours. If pain is still not well controlled, take Tramadol as needed. Take all antibiotics as prescribed (Keflex). Remember, no Aspirin or NSAIDS (Advil, Motrin, Aleve) as they may increase bruising. Take Zofran as needed for nausea.    Activity: Start walking as soon as possible to increase circulation and prevent blood clots. You may take care of your personal needs as desired, however, no lifting or strenuous activity is allowed for 4 weeks following surgery. Driving is not permitted for at least two weeks.    Wound care: Keep your dressing clean, dry, and intact until seen by MD/PA. Wear the surgical bra which will be provided to you at all times. Do not smoke! It delays wound healing and increases the risk of complications.    Drain care: Please monitor and record each drain's output daily. Keep an organized log and bring this with you to your postoperative visit.     Restrictions: No heavy lifting (nothing over 10 lbs) and no raising arms above 90 degrees until cleared.    Personal Hygiene: Do not get the operative area wet. You are allowed to sponge bathe. Do not remove the surgical bra. No tub soaking.    Things to expect: The operative area may be bruised, swollen, and painful. You may also have temporary numbness which will improve over time.    In case of emergency: call the office any time day or night. Post-operative care will be provided in the office one week following surgery. If you do not already have an appointment, please call during regular office hours to schedule: [phone].     We wish you a pleasant recovery.

## 2021-06-15 NOTE — BRIEF OPERATIVE NOTE - NSICDXBRIEFPROCEDURE_GEN_ALL_CORE_FT
PROCEDURES:  Bilateral simple mastectomy with sentinel node biopsy 15-Johnnie-2021 12:38:57  Juvencio Macias  Right axillary lymph node dissection 15-Johnnie-2021 12:39:43  Juvencio Macias  
PROCEDURES:  Reconstruction, breast, immediate, with tissue expander insertion 15-Johnnie-2021 14:06:37 pre-pectoral Maranda Garcia

## 2021-06-15 NOTE — ASU DISCHARGE PLAN (ADULT/PEDIATRIC) - D. IF YOU HAD ANY TYPE OF SEDATION, YOU MAY EXPERIENCE LIGHTHEADEDNESS, DIZZINESS, OR SLEEPINESS FOLLOWING YOUR PROCEDURE. A RESPONSIBLE ADULT SHOULD STAY WITH YOU FOR AT LEAST 24 HOURS FOLLOWING YOUR PROCEDURE.
Ongoing SW/CM Assessment/Plan of Care Note     See SW/CM flowsheets for goals and other objective data.    PT Recommendation:     pending  OT Recommendation:   pending    Disposition:   home with c vs. [facility]qu[facility]    Progress note:   Hx.Lymphoma-was scheduled to start Day#1 Cycle #1 of Rituxan on 11/3 per EMR.  Oncology consult-pending. D/c disposition pending Oncology plan. Left vm for pt's son Omid re; discharge disposition [facility] vs. [facility]quan if indicated. Referral sent to [facility] for vn/pt. And Rolling walker ordered for home use. Granddaughter is patients caregiver,transports and assists with meds. She states that pt.has been very dizzy lately and they would like her to have a rolling walker for home use. Harjeet is agreeable to [facility] but is requesting Surinamese speaking female staff members only. ADOD to be determined.      
SW/CM Discharge Plan:    Informed patient is ready for discharge. +COVID pneumonia. Patient’s discharge destination is home w/family and [facility] . Per Fatmata [facility] pt may still be active with [facility], writer left voicemail for [facility] at [phone]. Per granddaughter Carissa pt has not been seen by [facility] since August 2020. Per Fatmata [facility] sukhjinder f/u in AM. Patient to be picked up by son . Discharge plan communicated to RN and Fatmata liaison at [facility].    After Visit Summary - Transition Report Information  Receiving Agency/Facility: [facility]  Receiving Agency/Facility phone number: [phone]  
Weaned to room air. Vss. Iv steroids d/c'd.  To be d/c'd home today with sons and [facility]-vn/pt.  [facility] notified of pt's d/c.     
Statement Selected

## 2021-06-15 NOTE — BRIEF OPERATIVE NOTE - OPERATION/FINDINGS
bilateral breast mastectomy; bilateral sentinel lymph node; right sentinel lymph node positive for mets on frozen section

## 2021-06-15 NOTE — CHART NOTE - NSCHARTNOTEFT_GEN_A_CORE
PACU ANESTHESIA ADMISSION NOTE      Procedure: Mastectomy, bilateral, skin sparing    Bilateral simple mastectomy with sentinel node biopsy    Right axillary lymph node dissection      Post op diagnosis:      ____  Intubated  TV:______       Rate: ______      FiO2: ______    __x__  Patent Airway    ___x_  Full return of protective reflexes    ___x_  Full recovery from anesthesia / back to baseline status    Vitals:  temp(F) 98  /72  spo2 98  RR 18  pulse78    Mental Status:  _x ___ Awake   _____ Alert   _____ Drowsy   _____ Sedated    Nausea/Vomiting: x  ____ NO  ______Yes,   See Post - Op Orders          Pain Scale (0-10):  _____0    Treatment: ____ None    ____ See Post - Op/PCA Orders    Post - Operative Fluids:   ____ Oral   ____x  See Post - Op Orders    Plan: Discharge:   x ____Home       _____Floor     _____Critical Care    _____  Other:_________________    Comments: uneventful anesthesia course no complications. Vitals  stable. Pt transferred to PACU

## 2021-06-15 NOTE — ASU PREOP CHECKLIST - AS BP NONINV METHOD
Subjective   Brittani Lambert is a 57 y.o. female.   Chief Complaint   Patient presents with   • paperwork     requesting paperwork completion for her job      History of Present Illness   She is having some chest tightness, thinks it could be related to position of [employer].   Has a follow up with Dr. Genao this week, will discuss when port can be removed.   Reports that she recently quit smoking. Needing to complete labs for employee benefits, not fasting today.     Zoloft is not working as well. Mood is depressed.   Overall, family stress is also making things worse.   Prefers to not take Wellbutrin.     The following portions of the patient's history were reviewed and updated as appropriate: allergies, current medications, past family history, past medical history, past social history, past surgical history and problem list.    Review of Systems   Constitutional: Negative for chills and fever.   HENT: Negative for congestion, postnasal drip, sinus pressure and sore throat.    Respiratory: Positive for chest tightness. Negative for cough, shortness of breath and wheezing.    Cardiovascular: Negative.    Gastrointestinal: Negative.    Hematological: Negative for adenopathy.   Psychiatric/Behavioral: Positive for depressed mood and stress.       Objective   Physical Exam   Constitutional: She is oriented to person, place, and time. She appears well-developed and well-nourished.   HENT:   Head: Normocephalic and atraumatic.   Right Ear: External ear normal.   Left Ear: External ear normal.   Nose: Nose normal.   Eyes: Conjunctivae are normal.   Neck: Neck supple.   Cardiovascular: Normal rate, regular rhythm and normal heart sounds.   No murmur heard.  Pulmonary/Chest: Effort normal and breath sounds normal. No respiratory distress. She has no wheezes. She has no rhonchi.   Musculoskeletal: She exhibits no edema.   Neurological: She is alert and oriented to person, place, and time.   Skin: Skin is warm and dry.    Psychiatric: She has a normal mood and affect. Her behavior is normal. Judgment and thought content normal.   Nursing note and vitals reviewed.        Assessment/Plan   Brittani was seen today for paperwork.    Diagnoses and all orders for this visit:    Mixed hyperlipidemia  -     Comprehensive Metabolic Panel  -     CBC & Differential  -     Hemoglobin A1c  -     Lipid Panel    THALIA (generalized anxiety disorder)  -     Comprehensive Metabolic Panel  -     CBC & Differential  -     venlafaxine XR (EFFEXOR XR) 150 MG 24 hr capsule; Take 1 capsule by mouth Daily.    Moderate episode of recurrent major depressive disorder (CMS/HCC)  -     Comprehensive Metabolic Panel  -     CBC & Differential  -     venlafaxine XR (EFFEXOR XR) 150 MG 24 hr capsule; Take 1 capsule by mouth Daily.    Former tobacco use  -     Comprehensive Metabolic Panel  -     CBC & Differential  -     albuterol (PROAIR RESPICLICK) 108 (90 Base) MCG/ACT inhaler; Inhale 2 puffs Every 6 (Six) Hours As Needed for Wheezing or Shortness of Air.    Screening for diabetes mellitus  -     Hemoglobin A1c      Will change Zoloft to Effexor to improve depression and anxiety.   Albuterol inhaler to improve chest tightness.   She will return fasting to update labs.                 electronic

## 2021-06-15 NOTE — ASU DISCHARGE PLAN (ADULT/PEDIATRIC) - PAIN MANAGEMENT
Tramadol and Zofran sent via [facility]; Keflex and Gabapentin already sent pre-op (pt already has at home)/Prescription called to pharmacy

## 2021-06-15 NOTE — BRIEF OPERATIVE NOTE - NSICDXBRIEFPREOP_GEN_ALL_CORE_FT
PRE-OP DIAGNOSIS:  Breast cancer, right 15-Johnnie-2021 12:40:12  Juvencio Macias  
PRE-OP DIAGNOSIS:  Acquired absence of both breasts 15-Johnnie-2021 14:06:55  Maranda Garcia L

## 2021-06-16 ENCOUNTER — NON-APPOINTMENT (OUTPATIENT)
Age: 46
End: 2021-06-16

## 2021-06-21 DIAGNOSIS — I10 ESSENTIAL (PRIMARY) HYPERTENSION: ICD-10-CM

## 2021-06-21 DIAGNOSIS — Z88.1 ALLERGY STATUS TO OTHER ANTIBIOTIC AGENTS STATUS: ICD-10-CM

## 2021-06-21 DIAGNOSIS — Z42.1 ENCOUNTER FOR BREAST RECONSTRUCTION FOLLOWING MASTECTOMY: ICD-10-CM

## 2021-06-21 DIAGNOSIS — Z90.13 ACQUIRED ABSENCE OF BILATERAL BREASTS AND NIPPLES: ICD-10-CM

## 2021-06-21 DIAGNOSIS — Z85.3 PERSONAL HISTORY OF MALIGNANT NEOPLASM OF BREAST: ICD-10-CM

## 2021-06-22 LAB — SURGICAL PATHOLOGY STUDY: SIGNIFICANT CHANGE UP

## 2021-06-23 ENCOUNTER — APPOINTMENT (OUTPATIENT)
Dept: PLASTIC SURGERY | Facility: CLINIC | Age: 46
End: 2021-06-23
Payer: COMMERCIAL

## 2021-06-23 PROCEDURE — 99024 POSTOP FOLLOW-UP VISIT: CPT

## 2021-06-24 NOTE — PHYSICAL EXAM
[de-identified] : well-appearing, NAD [de-identified] : Mild scoliosis\par Prominent right costal chest vs left [de-identified] : Bilateral breasts soft, mastectomy flaps healthy, viable and well perfused with excellent cap refill, incisions healing well, c/d/i, no erythema or tissue ischemia, tissue expanders in good position, all drains functional with ss output \par

## 2021-06-24 NOTE — HISTORY OF PRESENT ILLNESS
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  Denies breast pain, nipple discharge and retraction.  Here with friend, Rukhsana.\par \par Patient is learning toward bilateral mastectomy.  She is inquiring about immediate pre-pec implant based reconstruction.  She would like simplest recovery. 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  She would like a full B in reconstructive volume.\par Recent lost weight 8 lbs. currently 118 lbs.  Mother passed away recently 2 months ago.\par \par Interval hx (21). Patient presents today POD#8 s/p BL SSM, Rt axillary dissection with immediate prepectoral TE reconstruction (filled to 100cc) with AlloDerm. Doing very well with adequate pain management. Taking oral antibiotics as prescribed. Denies any f/c or bleeding. Drains all functional with appropriate output. Rt axillary drain with minimal 5-7 cc daily.

## 2021-06-24 NOTE — ASSESSMENT
[FreeTextEntry1] : 46 yo F with right breast cancer who is POD#8 s/p BL SSM, Rt axillary dissection (Dr. Macias) with immediate prepectoral TE reconstruction (filled to 100cc) with AlloDerm. Doing very well. \par \par - right axillary drain removed\par - continue all remaining drains\par - continue oral antibiotics as instructed\par - continue surgical bra \par - post-op instructions reviewed and all questions answered \par - f/u 1 week with Dr. Gallegos\par \par \par

## 2021-06-28 ENCOUNTER — APPOINTMENT (OUTPATIENT)
Dept: HEMATOLOGY ONCOLOGY | Facility: CLINIC | Age: 46
End: 2021-06-28
Payer: COMMERCIAL

## 2021-06-28 ENCOUNTER — APPOINTMENT (OUTPATIENT)
Dept: PLASTIC SURGERY | Facility: CLINIC | Age: 46
End: 2021-06-28
Payer: COMMERCIAL

## 2021-06-28 VITALS
HEART RATE: 71 BPM | BODY MASS INDEX: 19.61 KG/M2 | DIASTOLIC BLOOD PRESSURE: 84 MMHG | TEMPERATURE: 98 F | SYSTOLIC BLOOD PRESSURE: 157 MMHG | WEIGHT: 122 LBS | HEIGHT: 66 IN

## 2021-06-28 PROCEDURE — 99024 POSTOP FOLLOW-UP VISIT: CPT

## 2021-06-28 PROCEDURE — 99215 OFFICE O/P EST HI 40 MIN: CPT

## 2021-06-28 NOTE — HISTORY OF PRESENT ILLNESS
[de-identified] : This is a 45 premenopausal woman being seen for wt loss.\par Pt has Lost 15 lbs in the last 6 mths. \par She has a good appetite.\par C/o having drenching  night sweats more often.\par She is known to have enlarged  neck Lns, saw ENt however could not get FNA as the nodes were too small.\par Had recent change in bowel habits over the last month or so with constipation and diarrhea. She has a follow up with GI.\par Has heavy periods, has fibroids, will see GYN \par She has an enlarged LNs in the groin\par colonoscopy done in 12/2/19 showed 7mm ascending colon polyp.\par EGD non erosive gastritis [de-identified] : 5/11/21\par Pt is here for follow up.\par She is here to discuss results and further plan of care.\par She has completed breast biopsy.\par She has surgery appt today.\par She also has a GI appt later in the week\par \par 6/28/21\par Pt is here for follow up.\par She is s/p B/L mastectomy with TE insertion.\par There were no post op complications.\par She still has a drain in place.\par She is here to discuss adjuvant therapy.\par Pt still is menstruating.

## 2021-06-28 NOTE — CONSULT LETTER
[Dear  ___] : Dear  [unfilled], [Consult Letter:] : I had the pleasure of evaluating your patient, [unfilled]. [Please see my note below.] : Please see my note below. [Consult Closing:] : Thank you very much for allowing me to participate in the care of this patient.  If you have any questions, please do not hesitate to contact me. [Sincerely,] : Sincerely, [DrMele  ___] : Dr. KIM [FreeTextEntry3] : Wolfgang Little MD\par Professor [employer]\par [facility]\par Attending Physician\par [facility]\par [phone]\par \par

## 2021-06-28 NOTE — PHYSICAL EXAM
[Fully active, able to carry on all pre-disease performance without restriction] : Status 0 - Fully active, able to carry on all pre-disease performance without restriction [Normal] : affect appropriate [de-identified] : B/L breasts s/p mastectomy and TE placement Drain in place on Rt side. No erythema or tenderness.

## 2021-07-01 ENCOUNTER — APPOINTMENT (OUTPATIENT)
Dept: HEMATOLOGY ONCOLOGY | Facility: CLINIC | Age: 46
End: 2021-07-01

## 2021-07-02 ENCOUNTER — APPOINTMENT (OUTPATIENT)
Dept: PLASTIC SURGERY | Facility: CLINIC | Age: 46
End: 2021-07-02
Payer: COMMERCIAL

## 2021-07-02 ENCOUNTER — NON-APPOINTMENT (OUTPATIENT)
Age: 46
End: 2021-07-02

## 2021-07-02 ENCOUNTER — APPOINTMENT (OUTPATIENT)
Dept: BREAST CENTER | Facility: CLINIC | Age: 46
End: 2021-07-02
Payer: COMMERCIAL

## 2021-07-02 VITALS
HEIGHT: 66 IN | BODY MASS INDEX: 19.61 KG/M2 | TEMPERATURE: 97.9 F | SYSTOLIC BLOOD PRESSURE: 118 MMHG | WEIGHT: 122 LBS | DIASTOLIC BLOOD PRESSURE: 74 MMHG

## 2021-07-02 PROCEDURE — 93702 BIS XTRACELL FLUID ANALYSIS: CPT

## 2021-07-02 PROCEDURE — 99024 POSTOP FOLLOW-UP VISIT: CPT

## 2021-07-02 NOTE — ASSESSMENT
[FreeTextEntry1] : 46 yo F with right breast cancer who is POD#17 s/p BL SSM, Rt axillary LN dissection (Dr. Macias) with immediate prepectoral TE reconstruction (filled to 100cc) with AlloDerm. Doing very well. \par \par Tolerated first fill 6/28- 130 total now\par \par - remaining right breast drain removed\par - continue oral antibiotics as instructed, last day today\par - continue surgical bra \par - post-op instructions reviewed and all questions answered \par - f/u next week for serial TE fill\par \par Due to COVID-19, pre-visit patient instructions were explained to the patient and their symptoms were checked upon arrival. Masks were used by the healthcare provider and staff and the examination room was cleaned after the patient visit concluded\par \par \par \par

## 2021-07-02 NOTE — PHYSICAL EXAM
[de-identified] : well-appearing, NAD [de-identified] : Mild scoliosis\par Prominent right costal chest vs left [de-identified] : Bilateral breasts soft, mastectomy flaps healthy, viable and well perfused with excellent cap refill, incisions healing well, c/d/i, no erythema or tissue ischemia, tissue expanders in good position, all drains functional with ss output \par

## 2021-07-02 NOTE — HISTORY OF PRESENT ILLNESS
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  Denies breast pain, nipple discharge and retraction.  Here with friend, Rukhsana.\par \par Patient is learning toward bilateral mastectomy.  She is inquiring about immediate pre-pec implant based reconstruction.  She would like simplest recovery. 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  She would like a full B in reconstructive volume.\par Recent lost weight 8 lbs. currently 118 lbs.  Mother passed away recently 2 months ago.\par \par Interval hx (21). Patient presents today POD#8 s/p BL SSM, Rt axillary dissection with immediate prepectoral TE reconstruction (filled to 100cc) with AlloDerm. Doing very well with adequate pain management. Taking oral antibiotics as prescribed. Denies any f/c or bleeding. Drains all functional with appropriate output. Rt axillary drain with minimal 5-7 cc daily. \par \par Interval hx (21). Patient presents today POD#13 s/p BL SSM, Rt axillary dissection with immediate prepectoral TE reconstruction (filled to 100cc) with AlloDerm. Doing well with no new concerns. Denies any f/c or bleeding. Drains functional with decreasing output as expected. \par \par Interval hx (21): Pt presents today POD #17 s/p BL SSM, Rt axillary dissection with immediate prepectoral TE reconstruction (filled to 100cc) with AlloDerm. Feeling well after first TE fill. Remaining right drain output: . Denies f/c, redness, swelling, CP/SOB, N/V, or calf pain.

## 2021-07-02 NOTE — DATA REVIEWED
[FreeTextEntry1] : Pathology             Final\par \par No Documents Attached\par \par \par \par   [facility] Accession Number : [id number]\par \par LADONNA GUZMAN E                         5\par \par \par \par Surgical Final Report\par \par \par Final Diagnosis\par 1. Breast, left, simple skin sparing mastectomy:\par - Focal atypical lobular hyperplasia (ALH).\par - Fibrofatty breast tissue with proliferative type fibrocystic\par changes including usual type duct hyperplasia, stromal fibrosis\par with foci of pseudoangiomatous stromal hyperplasia (PASH),\par sclerosing adenosis, cystic/papillary apocrine metaplasia, focal\par fibroadenomatoid mastopathy (sclerosing lobular hyperplasia), and\par rare microcalcifications.\par - Focal mammary duct ectasia.\par - Unremarkable nipple, skin and deep fascial margin including\par skeletal muscle.\par \par 2. Breast, left axillary sentinel lymph node #1, biopsy:\par - One benign lymph node (0/1). Negative for carcinoma with\par evaluation of multiple H T E levels and with negative\par immunohistochemical stains for cytokeratins (CK AE1/AE3 and CK\par 8/18).\par \par 3. Breast, right anterior margin, excision:\par - Benign predominantly fatty breast tissue and fresh hemorrhage\par without histopathologic abnormality. Negative for carcinoma.\par \par 4. Breast, right axillary sentinel lymph node #1, biopsy:\par - Micro-metastatic carcinoma present in one lymph node (1/1), the\par largest contiguous focus of which measures 1.5 mm (microscopic\par measurement), supported by positive immunohistochemical stains\par for cytokeratins (CK AE1/AE3 and CK 8/18).\par - No extracapsular extension is seen.\par \par 5. Breast, right, skin sparing modified radical mastectomy:\par - Healing prior biopsy site in the upper outer quadrant with\par invasive poorly differentiated ductal carcinoma, 3.5 cm\par (macroscopic measurement), associated with focal necrosis.\par - Non-extensive ductal carcinoma in-situ (DCIS), solid and\par comedo types associated with calcifications, high nuclear grade.\par - Numerous foci of lymphovascular invasion are seen.\par - The invasive tumor extends to touch a blue inked surface.\par - Focal atypical lobular hyperplasia (ALH) and proliferative\par type fibrocystic changes associated with microcalcifications.\par - Unremarkable nipple, skin, and deep fascial margin. Negative\par for carcinoma.\par - For hormone receptor and oncoprotein expression/gene\par amplification status please see the pathology report from the\par prior needle core biopsy specimen ([id number]).\par - AJCC 8th Edition Pathologic Stage: pT2, pN1a, pMx.\par \par 6. Breast, right axillary contents, excision:\par - Metastatic carcinoma present in two of twenty-four axillary\par lymph nodes (2/24), the largest contiguous focus of which\par measures 2.2 mm (microscopic measurement).\par - No extracapsular extension is seen.\par - Adjacent benign fibroadipose connective tissue and skeletal\par muscle\par containing a scant amount of ectopic/accessory benign fatty\par breast tissue.\par \par Verified by: Jesus Correa M.D.\par (Electronic Signature)\par Reported on: 06/22/21 15:43 EDT, [address] [facility] [address]\par Phone: [phone]   Fax: [phone]\par _________________________________________________________________\par \par Intraoperative Consultation\par The specimen is submitted for INTRAOPERATIVE CONSULTATION and the\par FROZEN SECTION diagnosis is reported at [address] , [place]\par [address] as:\par \par 2. One benign sentinel lymph node (0/1)\par \par Received at: 10:20 am\par Verbally reported at: 10:44 am by: Dr. PRETTY Correa to: Dr. SOUSA\par Colleen\par \par 4. Micrometastatic carcinoma in one sentinel lymph\par node (1/1), 1.5 mm\par \par Received at: 11:10 am\par Verbally reported at: 11:36 am by: Dr. PRETTY Correa to: Dr. SOUSA\par Colleen\par \par Comment\par Case reported to Tumor Registry.\par \par Clinical History\par Bilateral breast skin sparing mastectomy, sentinel node mapping\par and biopsy, possible axillary lymph node dissection\par COVID(-)\par \par Specimen(s) Submitted\par 1     Left breast\par Time obtained: 10:04 am\par 2     Left breast sentinel lymph node\par Time obtained: 10:10 am\par 3     Right breast anterior margin\par Time obtained: 10:30 am\par 4     Right breast sentinel lymph node\par Time obtained: 10:53 am\par 5     Right breast\par Time obtained: 11:14 am\par 6     Right axillary contents\par Time obtained: 12:30 pm\par \par Gross Description\par 1. The specimen is received fresh, labeled "left breast" and\par consists of a left mastectomy specimen measuring 15 x 14 x 2 cm\par and weighing 250 grams. There is an attached skin ellipse with a\par nipple. The skin ellipse measures 7 x 2 x 0.2 cm and is grossly\par unremarkable. The nipple measures 1 x 1 x 1 cm. The specimen is\par oriented by sutures as follows: short-superior, long-lateral. The\par specimen is inked as follows: upper outer quadrant-blue, lower\par outer quadrant-yellow, lower inner quadrant-orange, upper inner\par quadrant-green, posterior-black. The specimen is serially\par sectioned from medial to lateral to reveal white fibrous breast\par tissue admixed with yellow adipose tissue. No nodularities or\par masses are found on sectioning. Representative sections are\par submitted.\par \par Summary of Sections:\par \par 1A - nipple serially sectioned -1\par 1B - nipple en face -1\par 1C - upper outer quadrant  section -1\par 1D - upper inner quadrant section -1\par 1E - lower outer quadrant section -1\par 1F - lower inner quadrant section -1\par 1G - representative sections of skin -1\par 1H - posterior/deep margin section -1\par \par Total: 8 blocks\par \par \par 2. The specimen is received fresh, labeled "left breast sentinel\par node #1" and consists of a fragment of fibroadipose tissue,\par measuring 1.1 x 0.8 x 0.3 cm. One lymph node is identified,\par measuring 1 x 0.9 x 0.3 cm. The lymph node is bisected. The\par specimen is submitted entirely.\par \par Summary of Sections:\par 2FSA - Frozen section control -1\par 2A - Remainder fibroadipose tissue -1\par \par Total: 2 blocks\par \par 3. The specimen is received fresh, labeled "right breast anterior\par margin, stitch marks new anterior margin" and consists of a\par fragment of tan yellow lobulated adipose tissue, weighing 1 gm\par and measuring 2.2 x 1.5 x 0.8 cm.  The sutured new margin is\par inked red.  The opposite side is inked black. The cut surface\par appears uniform yellow.  The specimen is submitted entirely. (2\par blocks)\par \par \par 4. The specimen is received fresh, labeled "right breast sentinel\par node #1" and consists of a fragment of fibroadipose tissue,\par measuring 2.5 x 1 x 0.6 cm. One possible lymph node is\par identified, measuring 2.3 x 1.0 x 0.6 cm. The lymph node is\par serially sectioned. The specimen is submitted entirely.\par \par Summary of Sections:\par 4FSA - Frozen section control -1\par 4A - Remainder of fibroadipose tissue -1\par \par Total blocks - 2\par \par 5. The specimen is received fresh, labeled "right breast" and\par consists of a right breast mastectomy specimen measuring 15 x 2.5\par x 1.5 cm and weighing 435 grams. There is an attached tan\par wrinkled skin ellipse with nipple. The skin ellipse measures 6 x\par 3 x 0.2 cm and is unremarkable. The nipple measures 1 x 1 x 1 cm.\par The specimen is oriented by sutures as follows: short-superior,\par long-lateral. The specimen is inked as follows: upper outer\par quadrant-blue, lower outer quadrant-yellow, lower inner quadrant-\par orange, upper inner quadrant-green, and posterior-black. The\par specimen is serially sectioned from medial to lateral. There is a\par firm mass  in the upper outer quadrant that measures 3.5 x 2 x1\par cm. The mass is 0.7 cm from the upper outer quadrant margin and\par 0.5 cm grossly from the deep margin. The mass is serially\par sectioned to reveal a 2 mm long infinity-shaped clip. The mass is\par tan white and fibrous admixed with blood clots from an apparent\par previous biopsy cavity. The remainder of the specimen is\par unremarkable yellow adipose tissue. Representative sections are\par submitted.\par \par Summary of Sections:\par \par 5A - nipple -1\par 5B - nipple base -1\par 5C -5L - tumor - 10 (clips biopsy site 5F)\par 5M - upper outer quadrant -1\par 5N - upper inner quadrant -1\par 5O - lower outer quadrant -1\par 5P - lower inner quadrant -1\par 5Q - representative section of skin -1\par 5R - posterior/deep margin -1\par \par Total: 18 blocks\par \par 6. The specimen is received fresh, labeled "right axillary\par contents" and consists of multiple fragments of lobulated yellow\par adipose tissue measuring in aggregate 7 x 5 x 1.5 cm and weighing\par 12 grams. On palpation and dissection are multiple lymph nodes\par within the specimen. The largest lymph node measures 1.5 cm in\par diameter. Representative sections are submitted.\par \par Summary of Sections:\par \par 6A - largest lymph node bisected -1\par 6B-6L - smaller lymph nodes - 11\par \par Total: 12 blocks\par \par Specimen was received and underwent gross examination at [facility], [address] [place] [address],\[place] [address].\par \par 06/15/2021 10:38:29 EDT bc\par 06/16/2021 09:18:26 EDT mt\par 06/16/2021 11:46:45 EDT jl\par \par Perioperative Diagnosis\par IDC right\par \par  \par \par  Ordered by: TALA SETHI       Collected/Examined: 15Jun2021 10:04AM       \par Verification Required       Stage: Final       \par  Performed at: [facility]       Resulted: 22Jun2021 03:43PM       Last Updated: 22Jun2021 03:43PM       Accession: [id number]

## 2021-07-02 NOTE — PROCEDURE
[FreeTextEntry1] : s/p BL TE reconstruction  [FreeTextEntry2] : Tissue expander fill  [FreeTextEntry6] : The port location was identified and marked on the skin with the aid of the implant magnet. The skin was prepped in sterile fashion. Sterile saline was injected. The skin flaps remained well perfused, pink with good capillary refill <2 sec. The patient tolerated the procedure. \par \par Right TE pre-expansion volume: 100 cc\par Total volume right TE after expansion : 130 cc\par \par Left TE pre-expansion volume: 100 cc\par Total volume left TE after expansion : 130 cc

## 2021-07-02 NOTE — PHYSICAL EXAM
[de-identified] : well-appearing, NAD [de-identified] : Bilateral breasts soft, mastectomy flaps healthy, viable and well perfused with excellent cap refill, incisions healing well, c/d/i, no erythema or tissue ischemia, tissue expanders in good position, all drains functional with ss output \par  [de-identified] : Mild scoliosis\par Prominent right costal chest vs left

## 2021-07-02 NOTE — HISTORY OF PRESENT ILLNESS
[FreeTextEntry1] : 44 yo   F with PMHx of anxiety/depression, HTN, GERD, asthma who was recently dx with RIGHT invasive ductal carcinoma and DCIS 2.4 cm @ 10:00 (ER+/MS+/HER2(-)) after palpable mass in right breast.  Denies breast pain, nipple discharge and retraction.  Here with friend, Rukhsana.\par \par Patient is learning toward bilateral mastectomy.  She is inquiring about immediate pre-pec implant based reconstruction.  She would like simplest recovery. 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  She would like a full B in reconstructive volume.\par Recent lost weight 8 lbs. currently 118 lbs.  Mother passed away recently 2 months ago.\par \par Interval hx (21). Patient presents today POD#8 s/p BL SSM, Rt axillary dissection with immediate prepectoral TE reconstruction (filled to 100cc) with AlloDerm. Doing very well with adequate pain management. Taking oral antibiotics as prescribed. Denies any f/c or bleeding. Drains all functional with appropriate output. Rt axillary drain with minimal 5-7 cc daily. \par \par Interval hx (21). Patient presents today POD#13 s/p BL SSM, Rt axillary dissection with immediate prepectoral TE reconstruction (filled to 100cc) with AlloDerm. Doing well with no new concerns. Denies any f/c or bleeding. Drains functional with decreasing output as expected.

## 2021-07-02 NOTE — DATA REVIEWED
[FreeTextEntry1] : Pathology             Final\par \par No Documents Attached\par \par \par \par   [facility] Accession Number : [id number]\par \par LADONNA GUZMAN E                         5\par \par \par \par Surgical Final Report\par \par \par Final Diagnosis\par 1. Breast, left, simple skin sparing mastectomy:\par - Focal atypical lobular hyperplasia (ALH).\par - Fibrofatty breast tissue with proliferative type fibrocystic\par changes including usual type duct hyperplasia, stromal fibrosis\par with foci of pseudoangiomatous stromal hyperplasia (PASH),\par sclerosing adenosis, cystic/papillary apocrine metaplasia, focal\par fibroadenomatoid mastopathy (sclerosing lobular hyperplasia), and\par rare microcalcifications.\par - Focal mammary duct ectasia.\par - Unremarkable nipple, skin and deep fascial margin including\par skeletal muscle.\par \par 2. Breast, left axillary sentinel lymph node #1, biopsy:\par - One benign lymph node (0/1). Negative for carcinoma with\par evaluation of multiple H T E levels and with negative\par immunohistochemical stains for cytokeratins (CK AE1/AE3 and CK\par 8/18).\par \par 3. Breast, right anterior margin, excision:\par - Benign predominantly fatty breast tissue and fresh hemorrhage\par without histopathologic abnormality. Negative for carcinoma.\par \par 4. Breast, right axillary sentinel lymph node #1, biopsy:\par - Micro-metastatic carcinoma present in one lymph node (1/1), the\par largest contiguous focus of which measures 1.5 mm (microscopic\par measurement), supported by positive immunohistochemical stains\par for cytokeratins (CK AE1/AE3 and CK 8/18).\par - No extracapsular extension is seen.\par \par 5. Breast, right, skin sparing modified radical mastectomy:\par - Healing prior biopsy site in the upper outer quadrant with\par invasive poorly differentiated ductal carcinoma, 3.5 cm\par (macroscopic measurement), associated with focal necrosis.\par - Non-extensive ductal carcinoma in-situ (DCIS), solid and\par comedo types associated with calcifications, high nuclear grade.\par - Numerous foci of lymphovascular invasion are seen.\par - The invasive tumor extends to touch a blue inked surface.\par - Focal atypical lobular hyperplasia (ALH) and proliferative\par type fibrocystic changes associated with microcalcifications.\par - Unremarkable nipple, skin, and deep fascial margin. Negative\par for carcinoma.\par - For hormone receptor and oncoprotein expression/gene\par amplification status please see the pathology report from the\par prior needle core biopsy specimen ([id number]).\par - AJCC 8th Edition Pathologic Stage: pT2, pN1a, pMx.\par \par 6. Breast, right axillary contents, excision:\par - Metastatic carcinoma present in two of twenty-four axillary\par lymph nodes (2/24), the largest contiguous focus of which\par measures 2.2 mm (microscopic measurement).\par - No extracapsular extension is seen.\par - Adjacent benign fibroadipose connective tissue and skeletal\par muscle\par containing a scant amount of ectopic/accessory benign fatty\par breast tissue.\par \par Verified by: Jesus Correa M.D.\par (Electronic Signature)\par Reported on: 06/22/21 15:43 EDT, [address] [facility] [place] [address]\par Phone: [phone]   Fax: [phone]\par _________________________________________________________________\par \par Intraoperative Consultation\par The specimen is submitted for INTRAOPERATIVE CONSULTATION and the\par FROZEN SECTION diagnosis is reported at [address] , [place]\par [address] as:\par \par 2. One benign sentinel lymph node (0/1)\par \par Received at: 10:20 am\par Verbally reported at: 10:44 am by: Dr. PRETTY Correa to: Dr. SOUSA\par Colleen\par \par 4. Micrometastatic carcinoma in one sentinel lymph\par node (1/1), 1.5 mm\par \par Received at: 11:10 am\par Verbally reported at: 11:36 am by: Dr. PRETTY Correa to: Dr. SOUSA\par Colleen\par \par Comment\par Case reported to Tumor Registry.\par \par Clinical History\par Bilateral breast skin sparing mastectomy, sentinel node mapping\par and biopsy, possible axillary lymph node dissection\par COVID(-)\par \par Specimen(s) Submitted\par 1     Left breast\par Time obtained: 10:04 am\par 2     Left breast sentinel lymph node\par Time obtained: 10:10 am\par 3     Right breast anterior margin\par Time obtained: 10:30 am\par 4     Right breast sentinel lymph node\par Time obtained: 10:53 am\par 5     Right breast\par Time obtained: 11:14 am\par 6     Right axillary contents\par Time obtained: 12:30 pm\par \par Gross Description\par 1. The specimen is received fresh, labeled "left breast" and\par consists of a left mastectomy specimen measuring 15 x 14 x 2 cm\par and weighing 250 grams. There is an attached skin ellipse with a\par nipple. The skin ellipse measures 7 x 2 x 0.2 cm and is grossly\par unremarkable. The nipple measures 1 x 1 x 1 cm. The specimen is\par oriented by sutures as follows: short-superior, long-lateral. The\par specimen is inked as follows: upper outer quadrant-blue, lower\par outer quadrant-yellow, lower inner quadrant-orange, upper inner\par quadrant-green, posterior-black. The specimen is serially\par sectioned from medial to lateral to reveal white fibrous breast\par tissue admixed with yellow adipose tissue. No nodularities or\par masses are found on sectioning. Representative sections are\par submitted.\par \par Summary of Sections:\par \par 1A - nipple serially sectioned -1\par 1B - nipple en face -1\par 1C - upper outer quadrant  section -1\par 1D - upper inner quadrant section -1\par 1E - lower outer quadrant section -1\par 1F - lower inner quadrant section -1\par 1G - representative sections of skin -1\par 1H - posterior/deep margin section -1\par \par Total: 8 blocks\par \par \par 2. The specimen is received fresh, labeled "left breast sentinel\par node #1" and consists of a fragment of fibroadipose tissue,\par measuring 1.1 x 0.8 x 0.3 cm. One lymph node is identified,\par measuring 1 x 0.9 x 0.3 cm. The lymph node is bisected. The\par specimen is submitted entirely.\par \par Summary of Sections:\par 2FSA - Frozen section control -1\par 2A - Remainder fibroadipose tissue -1\par \par Total: 2 blocks\par \par 3. The specimen is received fresh, labeled "right breast anterior\par margin, stitch marks new anterior margin" and consists of a\par fragment of tan yellow lobulated adipose tissue, weighing 1 gm\par and measuring 2.2 x 1.5 x 0.8 cm.  The sutured new margin is\par inked red.  The opposite side is inked black. The cut surface\par appears uniform yellow.  The specimen is submitted entirely. (2\par blocks)\par \par \par 4. The specimen is received fresh, labeled "right breast sentinel\par node #1" and consists of a fragment of fibroadipose tissue,\par measuring 2.5 x 1 x 0.6 cm. One possible lymph node is\par identified, measuring 2.3 x 1.0 x 0.6 cm. The lymph node is\par serially sectioned. The specimen is submitted entirely.\par \par Summary of Sections:\par 4FSA - Frozen section control -1\par 4A - Remainder of fibroadipose tissue -1\par \par Total blocks - 2\par \par 5. The specimen is received fresh, labeled "right breast" and\par consists of a right breast mastectomy specimen measuring 15 x 2.5\par x 1.5 cm and weighing 435 grams. There is an attached tan\par wrinkled skin ellipse with nipple. The skin ellipse measures 6 x\par 3 x 0.2 cm and is unremarkable. The nipple measures 1 x 1 x 1 cm.\par The specimen is oriented by sutures as follows: short-superior,\par long-lateral. The specimen is inked as follows: upper outer\par quadrant-blue, lower outer quadrant-yellow, lower inner quadrant-\par orange, upper inner quadrant-green, and posterior-black. The\par specimen is serially sectioned from medial to lateral. There is a\par firm mass  in the upper outer quadrant that measures 3.5 x 2 x1\par cm. The mass is 0.7 cm from the upper outer quadrant margin and\par 0.5 cm grossly from the deep margin. The mass is serially\par sectioned to reveal a 2 mm long infinity-shaped clip. The mass is\par tan white and fibrous admixed with blood clots from an apparent\par previous biopsy cavity. The remainder of the specimen is\par unremarkable yellow adipose tissue. Representative sections are\par submitted.\par \par Summary of Sections:\par \par 5A - nipple -1\par 5B - nipple base -1\par 5C -5L - tumor - 10 (clips biopsy site 5F)\par 5M - upper outer quadrant -1\par 5N - upper inner quadrant -1\par 5O - lower outer quadrant -1\par 5P - lower inner quadrant -1\par 5Q - representative section of skin -1\par 5R - posterior/deep margin -1\par \par Total: 18 blocks\par \par 6. The specimen is received fresh, labeled "right axillary\par contents" and consists of multiple fragments of lobulated yellow\par adipose tissue measuring in aggregate 7 x 5 x 1.5 cm and weighing\par 12 grams. On palpation and dissection are multiple lymph nodes\par within the specimen. The largest lymph node measures 1.5 cm in\par diameter. Representative sections are submitted.\par \par Summary of Sections:\par \par 6A - largest lymph node bisected -1\par 6B-6L - smaller lymph nodes - 11\par \par Total: 12 blocks\par \par Specimen was received and underwent gross examination at [facility], [address] [place] [address],\[place] [address].\par \par 06/15/2021 10:38:29 EDT bc\par 06/16/2021 09:18:26 EDT mt\par 06/16/2021 11:46:45 EDT jl\par \par Perioperative Diagnosis\par IDC right\par \par  \par \par  Ordered by: TALA SETHI       Collected/Examined: 15Jun2021 10:04AM       \par Verification Required       Stage: Final       \par  Performed at: [facility]       Resulted: 22Jun2021 03:43PM       Last Updated: 22Jun2021 03:43PM       Accession: [id number]

## 2021-07-02 NOTE — ASSESSMENT
[FreeTextEntry1] : 44 yo F with right breast cancer who is POD#13 s/p BL SSM, Rt axillary LN dissection (Dr. Macias) with immediate prepectoral TE reconstruction (filled to 100cc) with AlloDerm. Doing very well. \par \par First fill today, 30 cc. Tolerated well. (130 total now).\par \par - left breast drains removed (5-7cc output per day)\par - continue right breast drain until Friday \par - suture tails removed\par - continue oral antibiotics as instructed\par - continue surgical bra \par - post-op instructions reviewed and all questions answered \par - f/u this Friday for removal of remaining drain then following week with Dr. Gallegos for TE fill \par \par \par

## 2021-07-02 NOTE — PROCEDURE
[FreeTextEntry1] : s/p BL TE reconstruction  [FreeTextEntry2] : Tissue expander fill  [FreeTextEntry6] : The port location was identified and marked on the skin with the aid of the implant magnet. The skin was prepped in sterile fashion. Sterile saline was injected. The skin flaps remained well perfused, pink with good capillary refill <2 sec. The patient tolerated the procedure. \par \par Right TE pre-expansion volume: 130 cc\par Total volume right TE after expansion : 130 cc\par \par Left TE pre-expansion volume: 130 cc\par Total volume left TE after expansion : 130 cc

## 2021-07-06 NOTE — DATA REVIEWED
[FreeTextEntry1] : Surgical Final Report\par \par Final Diagnosis\par 1. Breast, left, simple skin sparing mastectomy:\par - Focal atypical lobular hyperplasia (ALH).\par - Fibrofatty breast tissue with proliferative type fibrocystic\par changes including usual type duct hyperplasia, stromal fibrosis\par with foci of pseudoangiomatous stromal hyperplasia (PASH),\par sclerosing adenosis, cystic/papillary apocrine metaplasia, focal\par fibroadenomatoid mastopathy (sclerosing lobular hyperplasia), and\par rare microcalcifications.\par - Focal mammary duct ectasia.\par - Unremarkable nipple, skin and deep fascial margin including\par skeletal muscle.\par \par 2. Breast, left axillary sentinel lymph node #1, biopsy:\par - One benign lymph node (0/1). Negative for carcinoma with\par evaluation of multiple H T E levels and with negative\par immunohistochemical stains for cytokeratins (CK AE1/AE3 and CK\par 8/18).\par \par . Breast, right anterior margin, excision:\par - Benign predominantly fatty breast tissue and fresh hemorrhage\par without histopathologic abnormality. Negative for carcinoma.\par \par 4. Breast, right axillary sentinel lymph node #1, biopsy:\par - Micro-metastatic carcinoma present in one lymph node (1/1), the\par largest contiguous focus of which measures 1.5 mm (microscopic\par measurement), supported by positive immunohistochemical stains\par for cytokeratins (CK AE1/AE3 and CK 8/18).\par - No extracapsular extension is seen.\par \par 5. Breast, right, skin sparing modified radical mastectomy:\par - Healing prior biopsy site in the upper outer quadrant with\par invasive poorly differentiated ductal carcinoma, 3.5 cm\par (macroscopic measurement), associated with focal necrosis.\par - Non-extensive ductal carcinoma in-situ (DCIS), solid and\par comedo types associated with calcifications, high nuclear grade.\par - Numerous foci of lymphovascular invasion are seen.\par - The invasive tumor extends to touch a blue inked surface.\par - Focal atypical lobular hyperplasia (ALH) and proliferative\par type fibrocystic changes associated with microcalcifications.\par - Unremarkable nipple, skin, and deep fascial margin. Negative\par for carcinoma.\par - For hormone receptor and oncoprotein expression/gene\par amplification status please see the pathology report from the\par prior needle core biopsy specimen ([id number]).\par - AJCC 8th Edition Pathologic Stage: pT2, pN1a, pMx.\par \par 6. Breast, right axillary contents, excision:\par - Metastatic carcinoma present in two of twenty-four axillary\par lymph nodes (2/24), the largest contiguous focus of which\par measures 2.2 mm (microscopic measurement).\par - No extracapsular extension is seen.\par - Adjacent benign fibroadipose connective tissue and skeletal\par muscle containing a scant amount of ectopic/accessory benign fatty\par breast tissue.\par

## 2021-07-06 NOTE — ASSESSMENT
[FreeTextEntry1] : Patient is 45 year old premenopausal woman with pathologic stage IIB T2N1M0 HR+, HER2 negative IDC of right breast. S/p BL SSM, SNB and Right AND with immediate prepectoral TE reconstruction (Dr. Gallegos) - 06/15/2021.\par \par Adriamycin and cytoxan will be administered every 2 weeks X 4 with Neulasta support followed by taxol weekly x 12 \par F/U with  RT given 3 Positive LNs, she will be referred to Rad/Onc.\par She was started on Zoladex right away and add an AI after chemo and RT has been completed.\par Request Oncotype Dx \par Genetic testing did not reveal any mutations.\par \par All of her questions and concerns were addressed.\par \par Plan: f/u in 3 month for CBE.\par F/U Oncotype DX  \par \par I spent a total of 15 minutes of face to face time with this patient, greater than 50% of which was spent in counseling and/or coordination of care. All of her questions were appropriately answered. \par \par

## 2021-07-06 NOTE — PHYSICAL EXAM
[Normocephalic] : normocephalic [EOMI] : extra ocular movement intact [Supple] : supple [Examined in the supine and seated position] : examined in the supine and seated position [No Axillary Lymphadenopathy] : no left axillary lymphadenopathy [Soft] : abdomen soft [No Edema] : no edema [No Rashes] : no rashes [No Ulceration] : no ulceration [de-identified] : SOZO:-4.9

## 2021-07-06 NOTE — HISTORY OF PRESENT ILLNESS
[FreeTextEntry1] : PCP: Dr. Elena Yuan\par \par s/p US Guided Core Bx - 04/29/2021: (stop-light)\par Right, 10:00 N4, 2.4cm:\par - Invasive poorly differentiated ductal carcinoma with scattered\par single cell necrosis.\par - Ductal carcinoma in-situ (DCIS), solid type with single cell\par necrosis, high nuclear grade.\par ER  (+)  95%\par WA  (+)  50%\par HER2  (-)  1.2\par Ki-67    30%\par \par Right, 10:00 N11, 0.4cm: (mini-cork)\par - Benign fibrofatty breast tissue and skeletal muscle with\par dominant macrocyst wall fragments associated with an attenuated\par epithelial lining and reactive wall fibrosis consistent with a dilated duct.\par \par (-) family hx - breast / ovarian cancer.\par \par s/p BL SSM, SNB and Right AND with immediate prepectoral TE reconstruction (Dr. Gallegos) - 06/15/2021. Patient states she is feeling well today. No redness, discharge from incision area. No signs of infection. Mild tenderness noted that did not require any prescribed pain medications. \par \par Adriamycin and cytoxan will be administered every 2 weeks X 4 with Neulasta support followed by taxol weekly x 12 \par  06/28/21 Started on  Zoladex\par Oncotype Dx testing- pending \par Genetic testing (-)

## 2021-07-07 ENCOUNTER — APPOINTMENT (OUTPATIENT)
Dept: PLASTIC SURGERY | Facility: CLINIC | Age: 46
End: 2021-07-07
Payer: COMMERCIAL

## 2021-07-07 PROCEDURE — 99024 POSTOP FOLLOW-UP VISIT: CPT

## 2021-07-07 NOTE — PHYSICAL EXAM
[de-identified] : well-appearing, NAD [de-identified] : Mild scoliosis\par Prominent right costal chest vs left [de-identified] : Bilateral breasts soft, mastectomy flaps healthy, viable and well perfused with excellent cap refill, incisions healing well, c/d/i, no erythema or tissue ischemia, tissue expanders in good position, all drains functional with ss output \par

## 2021-07-07 NOTE — PROCEDURE
[FreeTextEntry1] : s/p BL TE reconstruction  [FreeTextEntry2] : Tissue expander fill  [FreeTextEntry6] : The port location was identified and marked on the skin with the aid of the implant magnet. The skin was prepped in sterile fashion. Sterile saline was injected. The skin flaps remained well perfused, pink with good capillary refill <2 sec. The patient tolerated the procedure. \par \par Left mastectomy 250 g; Right mastectomy 435 g\par \par Right TE pre-expansion volume: 130 cc\par Total volume right TE after expansion : 160 cc\par \par Left TE pre-expansion volume: 130 cc\par Total volume left TE after expansion : 160 cc

## 2021-07-07 NOTE — HISTORY OF PRESENT ILLNESS
[FreeTextEntry1] : 44 yo   F with PMHx of anxiety/depression, HTN, GERD, asthma who was recently dx with RIGHT invasive ductal carcinoma and DCIS 2.4 cm @ 10:00 (ER+/WI+/HER2(-)) after palpable mass in right breast.  Denies breast pain, nipple discharge and retraction.  Here with friend, Rukhsana.\par \par Patient is learning toward bilateral mastectomy.  She is inquiring about immediate pre-pec implant based reconstruction.  She would like simplest recovery. 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  She would like a full B in reconstructive volume.\par Recent lost weight 8 lbs. currently 118 lbs.  Mother passed away recently 2 months ago.\par \par Interval hx (21). Patient presents today POD#8 s/p BL SSM, Rt axillary dissection with immediate prepectoral TE reconstruction (filled to 100cc) with AlloDerm. Doing very well with adequate pain management. Taking oral antibiotics as prescribed. Denies any f/c or bleeding. Drains all functional with appropriate output. Rt axillary drain with minimal 5-7 cc daily. \par \par Interval hx (21). Patient presents today POD#13 s/p BL SSM, Rt axillary dissection with immediate prepectoral TE reconstruction (filled to 100cc) with AlloDerm. Doing well with no new concerns. Denies any f/c or bleeding. Drains functional with decreasing output as expected. \par \par Interval hx (21): Pt presents today POD #17 s/p BL SSM, Rt axillary dissection with immediate prepectoral TE reconstruction (filled to 100cc) with AlloDerm. Feeling well after first TE fill. Remaining right drain output: . Denies f/c, redness, swelling, CP/SOB, N/V, or calf pain.\par \par Interval hx (21): Pt presents today POD #22 s/p BL SSM, Rt axillary dissection with immediate prepectoral TE reconstruction (filled to 100cc) with AlloDerm. Saw Dr. Macias  who discussed CTX x12 weeks and recommended RT consult given 3 positive LNs.

## 2021-07-07 NOTE — ASSESSMENT
[FreeTextEntry1] : 44 yo F with right breast cancer who is POD#22 s/p BL SSM, Rt axillary LN dissection (Dr. Macias) with immediate prepectoral TE reconstruction (filled to 100cc) with AlloDerm. Doing very well. \par \par Tolerated serial TE fill\par \par -Continue surgical bra or sports bra\par -May start chemotherapy\par -Radiation oncology consultation\par -No heavy lifting\par -F/u 1 week for serial fill\par \par Due to COVID-19, pre-visit patient instructions were explained to the patient and their symptoms were checked upon arrival. Masks were used by the healthcare provider and staff and the examination room was cleaned after the patient visit concluded\par \par \par

## 2021-07-07 NOTE — DATA REVIEWED
[FreeTextEntry1] : Pathology             Final\par \par No Documents Attached\par \par \par \par   [facility] Accession Number : [id number]\par \par LADONNA GUZMAN E                         5\par \par \par \par Surgical Final Report\par \par \par Final Diagnosis\par 1. Breast, left, simple skin sparing mastectomy:\par - Focal atypical lobular hyperplasia (ALH).\par - Fibrofatty breast tissue with proliferative type fibrocystic\par changes including usual type duct hyperplasia, stromal fibrosis\par with foci of pseudoangiomatous stromal hyperplasia (PASH),\par sclerosing adenosis, cystic/papillary apocrine metaplasia, focal\par fibroadenomatoid mastopathy (sclerosing lobular hyperplasia), and\par rare microcalcifications.\par - Focal mammary duct ectasia.\par - Unremarkable nipple, skin and deep fascial margin including\par skeletal muscle.\par \par 2. Breast, left axillary sentinel lymph node #1, biopsy:\par - One benign lymph node (0/1). Negative for carcinoma with\par evaluation of multiple H T E levels and with negative\par immunohistochemical stains for cytokeratins (CK AE1/AE3 and CK\par 8/18).\par \par 3. Breast, right anterior margin, excision:\par - Benign predominantly fatty breast tissue and fresh hemorrhage\par without histopathologic abnormality. Negative for carcinoma.\par \par 4. Breast, right axillary sentinel lymph node #1, biopsy:\par - Micro-metastatic carcinoma present in one lymph node (1/1), the\par largest contiguous focus of which measures 1.5 mm (microscopic\par measurement), supported by positive immunohistochemical stains\par for cytokeratins (CK AE1/AE3 and CK 8/18).\par - No extracapsular extension is seen.\par \par 5. Breast, right, skin sparing modified radical mastectomy:\par - Healing prior biopsy site in the upper outer quadrant with\par invasive poorly differentiated ductal carcinoma, 3.5 cm\par (macroscopic measurement), associated with focal necrosis.\par - Non-extensive ductal carcinoma in-situ (DCIS), solid and\par comedo types associated with calcifications, high nuclear grade.\par - Numerous foci of lymphovascular invasion are seen.\par - The invasive tumor extends to touch a blue inked surface.\par - Focal atypical lobular hyperplasia (ALH) and proliferative\par type fibrocystic changes associated with microcalcifications.\par - Unremarkable nipple, skin, and deep fascial margin. Negative\par for carcinoma.\par - For hormone receptor and oncoprotein expression/gene\par amplification status please see the pathology report from the\par prior needle core biopsy specimen ([id number]).\par - AJCC 8th Edition Pathologic Stage: pT2, pN1a, pMx.\par \par 6. Breast, right axillary contents, excision:\par - Metastatic carcinoma present in two of twenty-four axillary\par lymph nodes (2/24), the largest contiguous focus of which\par measures 2.2 mm (microscopic measurement).\par - No extracapsular extension is seen.\par - Adjacent benign fibroadipose connective tissue and skeletal\par muscle\par containing a scant amount of ectopic/accessory benign fatty\par breast tissue.\par \par Verified by: Jesus Correa M.D.\par (Electronic Signature)\par Reported on: 06/22/21 15:43 EDT, [address] [place] [address]\par Phone: [phone]   Fax: [phone]\par _________________________________________________________________\par \par Intraoperative Consultation\par The specimen is submitted for INTRAOPERATIVE CONSULTATION and the\par FROZEN SECTION diagnosis is reported at [address] , [place]\par [address] as:\par \par 2. One benign sentinel lymph node (0/1)\par \par Received at: 10:20 am\par Verbally reported at: 10:44 am by: Dr. PRETTY Correa to: Dr. SOUSA\par Colleen\par \par 4. Micrometastatic carcinoma in one sentinel lymph\par node (1/1), 1.5 mm\par \par Received at: 11:10 am\par Verbally reported at: 11:36 am by: Dr. PRETTY Correa to: Dr. SOUSA\par Colleen\par \par Comment\par Case reported to Tumor Registry.\par \par Clinical History\par Bilateral breast skin sparing mastectomy, sentinel node mapping\par and biopsy, possible axillary lymph node dissection\par COVID(-)\par \par Specimen(s) Submitted\par 1     Left breast\par Time obtained: 10:04 am\par 2     Left breast sentinel lymph node\par Time obtained: 10:10 am\par 3     Right breast anterior margin\par Time obtained: 10:30 am\par 4     Right breast sentinel lymph node\par Time obtained: 10:53 am\par 5     Right breast\par Time obtained: 11:14 am\par 6     Right axillary contents\par Time obtained: 12:30 pm\par \par Gross Description\par 1. The specimen is received fresh, labeled "left breast" and\par consists of a left mastectomy specimen measuring 15 x 14 x 2 cm\par and weighing 250 grams. There is an attached skin ellipse with a\par nipple. The skin ellipse measures 7 x 2 x 0.2 cm and is grossly\par unremarkable. The nipple measures 1 x 1 x 1 cm. The specimen is\par oriented by sutures as follows: short-superior, long-lateral. The\par specimen is inked as follows: upper outer quadrant-blue, lower\par outer quadrant-yellow, lower inner quadrant-orange, upper inner\par quadrant-green, posterior-black. The specimen is serially\par sectioned from medial to lateral to reveal white fibrous breast\par tissue admixed with yellow adipose tissue. No nodularities or\par masses are found on sectioning. Representative sections are\par submitted.\par \par Summary of Sections:\par \par 1A - nipple serially sectioned -1\par 1B - nipple en face -1\par 1C - upper outer quadrant  section -1\par 1D - upper inner quadrant section -1\par 1E - lower outer quadrant section -1\par 1F - lower inner quadrant section -1\par 1G - representative sections of skin -1\par 1H - posterior/deep margin section -1\par \par Total: 8 blocks\par \par \par 2. The specimen is received fresh, labeled "left breast sentinel\par node #1" and consists of a fragment of fibroadipose tissue,\par measuring 1.1 x 0.8 x 0.3 cm. One lymph node is identified,\par measuring 1 x 0.9 x 0.3 cm. The lymph node is bisected. The\par specimen is submitted entirely.\par \par Summary of Sections:\par 2FSA - Frozen section control -1\par 2A - Remainder fibroadipose tissue -1\par \par Total: 2 blocks\par \par 3. The specimen is received fresh, labeled "right breast anterior\par margin, stitch marks new anterior margin" and consists of a\par fragment of tan yellow lobulated adipose tissue, weighing 1 gm\par and measuring 2.2 x 1.5 x 0.8 cm.  The sutured new margin is\par inked red.  The opposite side is inked black. The cut surface\par appears uniform yellow.  The specimen is submitted entirely. (2\par blocks)\par \par \par 4. The specimen is received fresh, labeled "right breast sentinel\par node #1" and consists of a fragment of fibroadipose tissue,\par measuring 2.5 x 1 x 0.6 cm. One possible lymph node is\par identified, measuring 2.3 x 1.0 x 0.6 cm. The lymph node is\par serially sectioned. The specimen is submitted entirely.\par \par Summary of Sections:\par 4FSA - Frozen section control -1\par 4A - Remainder of fibroadipose tissue -1\par \par Total blocks - 2\par \par 5. The specimen is received fresh, labeled "right breast" and\par consists of a right breast mastectomy specimen measuring 15 x 2.5\par x 1.5 cm and weighing 435 grams. There is an attached tan\par wrinkled skin ellipse with nipple. The skin ellipse measures 6 x\par 3 x 0.2 cm and is unremarkable. The nipple measures 1 x 1 x 1 cm.\par The specimen is oriented by sutures as follows: short-superior,\par long-lateral. The specimen is inked as follows: upper outer\par quadrant-blue, lower outer quadrant-yellow, lower inner quadrant-\par orange, upper inner quadrant-green, and posterior-black. The\par specimen is serially sectioned from medial to lateral. There is a\par firm mass  in the upper outer quadrant that measures 3.5 x 2 x1\par cm. The mass is 0.7 cm from the upper outer quadrant margin and\par 0.5 cm grossly from the deep margin. The mass is serially\par sectioned to reveal a 2 mm long infinity-shaped clip. The mass is\par tan white and fibrous admixed with blood clots from an apparent\par previous biopsy cavity. The remainder of the specimen is\par unremarkable yellow adipose tissue. Representative sections are\par submitted.\par \par Summary of Sections:\par \par 5A - nipple -1\par 5B - nipple base -1\par 5C -5L - tumor - 10 (clips biopsy site 5F)\par 5M - upper outer quadrant -1\par 5N - upper inner quadrant -1\par 5O - lower outer quadrant -1\par 5P - lower inner quadrant -1\par 5Q - representative section of skin -1\par 5R - posterior/deep margin -1\par \par Total: 18 blocks\par \par 6. The specimen is received fresh, labeled "right axillary\par contents" and consists of multiple fragments of lobulated yellow\par adipose tissue measuring in aggregate 7 x 5 x 1.5 cm and weighing\par 12 grams. On palpation and dissection are multiple lymph nodes\par within the specimen. The largest lymph node measures 1.5 cm in\par diameter. Representative sections are submitted.\par \par Summary of Sections:\par \par 6A - largest lymph node bisected -1\par 6B-6L - smaller lymph nodes - 11\par \par Total: 12 blocks\par \par Specimen was received and underwent gross examination at [facility], [address] [place] [address],\[place] [address].\par \par 06/15/2021 10:38:29 EDT bc\par 06/16/2021 09:18:26 EDT mt\par 06/16/2021 11:46:45 EDT jl\par \par Perioperative Diagnosis\par IDC right\par \par  \par \par  Ordered by: TALA SETHI       Collected/Examined: 15Jun2021 10:04AM       \par Verification Required       Stage: Final       \par  Performed at: [facility]       Resulted: 22Jun2021 03:43PM       Last Updated: 22Jun2021 03:43PM       Accession: [id number]

## 2021-07-08 ENCOUNTER — OUTPATIENT (OUTPATIENT)
Dept: OUTPATIENT SERVICES | Facility: HOSPITAL | Age: 46
LOS: 1 days | Discharge: HOME | End: 2021-07-08

## 2021-07-08 VITALS
TEMPERATURE: 98 F | WEIGHT: 119.93 LBS | DIASTOLIC BLOOD PRESSURE: 84 MMHG | HEART RATE: 76 BPM | RESPIRATION RATE: 16 BRPM | HEIGHT: 66 IN | SYSTOLIC BLOOD PRESSURE: 123 MMHG | OXYGEN SATURATION: 99 %

## 2021-07-08 DIAGNOSIS — Z90.49 ACQUIRED ABSENCE OF OTHER SPECIFIED PARTS OF DIGESTIVE TRACT: Chronic | ICD-10-CM

## 2021-07-08 DIAGNOSIS — Z90.13 ACQUIRED ABSENCE OF BILATERAL BREASTS AND NIPPLES: Chronic | ICD-10-CM

## 2021-07-08 DIAGNOSIS — Z01.818 ENCOUNTER FOR OTHER PREPROCEDURAL EXAMINATION: ICD-10-CM

## 2021-07-08 DIAGNOSIS — Z45.2 ENCOUNTER FOR ADJUSTMENT AND MANAGEMENT OF VASCULAR ACCESS DEVICE: ICD-10-CM

## 2021-07-08 DIAGNOSIS — C50.919 MALIGNANT NEOPLASM OF UNSPECIFIED SITE OF UNSPECIFIED FEMALE BREAST: ICD-10-CM

## 2021-07-08 DIAGNOSIS — Z98.890 OTHER SPECIFIED POSTPROCEDURAL STATES: Chronic | ICD-10-CM

## 2021-07-08 DIAGNOSIS — M67.40 GANGLION, UNSPECIFIED SITE: Chronic | ICD-10-CM

## 2021-07-08 LAB
ALBUMIN SERPL ELPH-MCNC: 4.7 G/DL — SIGNIFICANT CHANGE UP (ref 3.5–5.2)
ALP SERPL-CCNC: 75 U/L — SIGNIFICANT CHANGE UP (ref 30–115)
ALT FLD-CCNC: 37 U/L — SIGNIFICANT CHANGE UP (ref 0–41)
ANION GAP SERPL CALC-SCNC: 9 MMOL/L — SIGNIFICANT CHANGE UP (ref 7–14)
APTT BLD: 26.8 SEC — LOW (ref 27–39.2)
AST SERPL-CCNC: 36 U/L — SIGNIFICANT CHANGE UP (ref 0–41)
BASOPHILS # BLD AUTO: 0.09 K/UL — SIGNIFICANT CHANGE UP (ref 0–0.2)
BASOPHILS NFR BLD AUTO: 1.8 % — HIGH (ref 0–1)
BILIRUB SERPL-MCNC: <0.2 MG/DL — SIGNIFICANT CHANGE UP (ref 0.2–1.2)
BUN SERPL-MCNC: 19 MG/DL — SIGNIFICANT CHANGE UP (ref 10–20)
CALCIUM SERPL-MCNC: 9.4 MG/DL — SIGNIFICANT CHANGE UP (ref 8.5–10.1)
CHLORIDE SERPL-SCNC: 105 MMOL/L — SIGNIFICANT CHANGE UP (ref 98–110)
CO2 SERPL-SCNC: 25 MMOL/L — SIGNIFICANT CHANGE UP (ref 17–32)
CREAT SERPL-MCNC: 0.8 MG/DL — SIGNIFICANT CHANGE UP (ref 0.7–1.5)
EOSINOPHIL # BLD AUTO: 0.56 K/UL — SIGNIFICANT CHANGE UP (ref 0–0.7)
EOSINOPHIL NFR BLD AUTO: 11.3 % — HIGH (ref 0–8)
GLUCOSE SERPL-MCNC: 90 MG/DL — SIGNIFICANT CHANGE UP (ref 70–99)
HCG SERPL-ACNC: <0.6 MIU/ML — SIGNIFICANT CHANGE UP
HCT VFR BLD CALC: 34.1 % — LOW (ref 37–47)
HGB BLD-MCNC: 10.7 G/DL — LOW (ref 12–16)
IMM GRANULOCYTES NFR BLD AUTO: 0.4 % — HIGH (ref 0.1–0.3)
INR BLD: 0.95 RATIO — SIGNIFICANT CHANGE UP (ref 0.65–1.3)
LYMPHOCYTES # BLD AUTO: 1.43 K/UL — SIGNIFICANT CHANGE UP (ref 1.2–3.4)
LYMPHOCYTES # BLD AUTO: 28.9 % — SIGNIFICANT CHANGE UP (ref 20.5–51.1)
MCHC RBC-ENTMCNC: 28.5 PG — SIGNIFICANT CHANGE UP (ref 27–31)
MCHC RBC-ENTMCNC: 31.4 G/DL — LOW (ref 32–37)
MCV RBC AUTO: 90.7 FL — SIGNIFICANT CHANGE UP (ref 81–99)
MONOCYTES # BLD AUTO: 0.55 K/UL — SIGNIFICANT CHANGE UP (ref 0.1–0.6)
MONOCYTES NFR BLD AUTO: 11.1 % — HIGH (ref 1.7–9.3)
NEUTROPHILS # BLD AUTO: 2.29 K/UL — SIGNIFICANT CHANGE UP (ref 1.4–6.5)
NEUTROPHILS NFR BLD AUTO: 46.5 % — SIGNIFICANT CHANGE UP (ref 42.2–75.2)
NRBC # BLD: 0 /100 WBCS — SIGNIFICANT CHANGE UP (ref 0–0)
PLATELET # BLD AUTO: 353 K/UL — SIGNIFICANT CHANGE UP (ref 130–400)
POTASSIUM SERPL-MCNC: 4.6 MMOL/L — SIGNIFICANT CHANGE UP (ref 3.5–5)
POTASSIUM SERPL-SCNC: 4.6 MMOL/L — SIGNIFICANT CHANGE UP (ref 3.5–5)
PROT SERPL-MCNC: 7.3 G/DL — SIGNIFICANT CHANGE UP (ref 6–8)
PROTHROM AB SERPL-ACNC: 10.9 SEC — SIGNIFICANT CHANGE UP (ref 9.95–12.87)
RBC # BLD: 3.76 M/UL — LOW (ref 4.2–5.4)
RBC # FLD: 13.9 % — SIGNIFICANT CHANGE UP (ref 11.5–14.5)
SODIUM SERPL-SCNC: 139 MMOL/L — SIGNIFICANT CHANGE UP (ref 135–146)
WBC # BLD: 4.94 K/UL — SIGNIFICANT CHANGE UP (ref 4.8–10.8)
WBC # FLD AUTO: 4.94 K/UL — SIGNIFICANT CHANGE UP (ref 4.8–10.8)

## 2021-07-08 RX ORDER — ASCORBIC ACID 60 MG
1 TABLET,CHEWABLE ORAL
Qty: 0 | Refills: 0 | DISCHARGE

## 2021-07-08 RX ORDER — METOPROLOL TARTRATE 50 MG
1.5 TABLET ORAL
Qty: 0 | Refills: 0 | DISCHARGE

## 2021-07-08 RX ORDER — CHOLECALCIFEROL (VITAMIN D3) 125 MCG
0 CAPSULE ORAL
Qty: 0 | Refills: 0 | DISCHARGE

## 2021-07-08 NOTE — H&P PST ADULT - NSICDXPASTMEDICALHX_GEN_ALL_CORE_FT
PAST MEDICAL HISTORY:  Anxiety     H/O thyroid nodule f/u with endo- 'insignificant at present'    HTN (hypertension)     Lung nodule ON CT

## 2021-07-08 NOTE — H&P PST ADULT - HISTORY OF PRESENT ILLNESS
44 Y/O FEMALE PRESENTS TO PAST WITH HX BREAST CA, RIGHT  . PT S/P B/L MASTECTOMY WITH EXPANDERS 6/15/21 . PT C/O POSS LYMPH NODES ( AXILLARY, SENTINEL)    PT NOW FOR SCHEDULED PROCEDURE ( PORT PLACEMENT ) . PT DENIES ANY CP SOB PALP COUGH DYSURIA FEVER URI. PT ABLE TO DARYL 1-2 FOS W/O SOB  pt denies any covid s/s, or tested positive in the past  pt advised self quarantine till day of procedure  Anesthesia Alert  NO--Difficult Airway  NO--History of neck surgery or radiation  NO--Limited ROM of neck  NO--History of Malignant hyperthermia  NO--Personal or family history of Pseudocholinesterase deficiency.  NO--Prior Anesthesia Complication  NO--Latex Allergy  NO--Loose teeth  NO--History of Rheumatoid Arthritis  NO--MOHSEN  NO--Bleeding risk  NO--Other_____

## 2021-07-08 NOTE — H&P PST ADULT - NSICDXPASTSURGICALHX_GEN_ALL_CORE_FT
PAST SURGICAL HISTORY:  Ganglion cyst     H/O bilateral mastectomy expanders    H/O colonoscopy 2014    History of appendectomy     History of D&C

## 2021-07-09 ENCOUNTER — LABORATORY RESULT (OUTPATIENT)
Age: 46
End: 2021-07-09

## 2021-07-09 ENCOUNTER — OUTPATIENT (OUTPATIENT)
Dept: OUTPATIENT SERVICES | Facility: HOSPITAL | Age: 46
LOS: 1 days | Discharge: HOME | End: 2021-07-09

## 2021-07-09 ENCOUNTER — APPOINTMENT (OUTPATIENT)
Dept: HEMATOLOGY ONCOLOGY | Facility: CLINIC | Age: 46
End: 2021-07-09

## 2021-07-09 DIAGNOSIS — Z90.49 ACQUIRED ABSENCE OF OTHER SPECIFIED PARTS OF DIGESTIVE TRACT: Chronic | ICD-10-CM

## 2021-07-09 DIAGNOSIS — Z98.890 OTHER SPECIFIED POSTPROCEDURAL STATES: Chronic | ICD-10-CM

## 2021-07-09 DIAGNOSIS — Z11.59 ENCOUNTER FOR SCREENING FOR OTHER VIRAL DISEASES: ICD-10-CM

## 2021-07-09 DIAGNOSIS — M67.40 GANGLION, UNSPECIFIED SITE: Chronic | ICD-10-CM

## 2021-07-09 DIAGNOSIS — Z90.13 ACQUIRED ABSENCE OF BILATERAL BREASTS AND NIPPLES: Chronic | ICD-10-CM

## 2021-07-12 ENCOUNTER — RESULT REVIEW (OUTPATIENT)
Age: 46
End: 2021-07-12

## 2021-07-12 ENCOUNTER — OUTPATIENT (OUTPATIENT)
Dept: OUTPATIENT SERVICES | Facility: HOSPITAL | Age: 46
LOS: 1 days | Discharge: HOME | End: 2021-07-12
Payer: COMMERCIAL

## 2021-07-12 DIAGNOSIS — Z98.890 OTHER SPECIFIED POSTPROCEDURAL STATES: Chronic | ICD-10-CM

## 2021-07-12 DIAGNOSIS — Z90.13 ACQUIRED ABSENCE OF BILATERAL BREASTS AND NIPPLES: Chronic | ICD-10-CM

## 2021-07-12 DIAGNOSIS — M67.40 GANGLION, UNSPECIFIED SITE: Chronic | ICD-10-CM

## 2021-07-12 DIAGNOSIS — Z90.49 ACQUIRED ABSENCE OF OTHER SPECIFIED PARTS OF DIGESTIVE TRACT: Chronic | ICD-10-CM

## 2021-07-12 PROCEDURE — 36561 INSERT TUNNELED CV CATH: CPT

## 2021-07-12 PROCEDURE — 99152 MOD SED SAME PHYS/QHP 5/>YRS: CPT

## 2021-07-12 PROCEDURE — 77001 FLUOROGUIDE FOR VEIN DEVICE: CPT | Mod: 26

## 2021-07-12 PROCEDURE — 76937 US GUIDE VASCULAR ACCESS: CPT | Mod: 26

## 2021-07-12 NOTE — PROGRESS NOTE ADULT - SUBJECTIVE AND OBJECTIVE BOX
INTERVENTIONAL RADIOLOGY BRIEF-OPERATIVE NOTE    Procedure: image guided port placement with conscious sedation    Pre-Op Diagnosis: Chemotherapy    Post-Op Diagnosis: same    Attending: Huong  Resident: Portal    Anesthesia (type):  [ ] General Anesthesia  [x ] Sedation  [ ] Spinal Anesthesia  [ x] Local/Regional    Contrast: None    Estimated Blood Loss: Minimal, < 5 cc    Condition:   [ ] Critical  [ ] Serious  [ ] Fair   [ c] Good    Findings/Follow up Plan of Care: Successful placement of left sided port. Pt tolerated the procedure well.    Specimens Removed: none    Implants: left chest port    Complications: none immediate    Disposition: Discharge to home      Please call Interventional Radiology [phone] with any questions, concerns, or issues.

## 2021-07-13 ENCOUNTER — APPOINTMENT (OUTPATIENT)
Dept: CARDIOLOGY | Facility: CLINIC | Age: 46
End: 2021-07-13
Payer: COMMERCIAL

## 2021-07-13 VITALS
HEIGHT: 66 IN | WEIGHT: 124 LBS | TEMPERATURE: 96.7 F | DIASTOLIC BLOOD PRESSURE: 78 MMHG | SYSTOLIC BLOOD PRESSURE: 110 MMHG | BODY MASS INDEX: 19.93 KG/M2 | HEART RATE: 79 BPM

## 2021-07-13 PROCEDURE — 99072 ADDL SUPL MATRL&STAF TM PHE: CPT

## 2021-07-13 PROCEDURE — 99214 OFFICE O/P EST MOD 30 MIN: CPT

## 2021-07-13 PROCEDURE — 93000 ELECTROCARDIOGRAM COMPLETE: CPT

## 2021-07-13 RX ORDER — GABAPENTIN 300 MG/1
300 CAPSULE ORAL
Qty: 11 | Refills: 0 | Status: DISCONTINUED | COMMUNITY
Start: 2021-06-07 | End: 2021-07-13

## 2021-07-13 RX ORDER — METOPROLOL SUCCINATE 25 MG/1
25 TABLET, EXTENDED RELEASE ORAL
Refills: 0 | Status: DISCONTINUED | COMMUNITY
End: 2021-07-13

## 2021-07-13 RX ORDER — IBUPROFEN 600 MG
600 TABLET ORAL
Refills: 0 | Status: DISCONTINUED | COMMUNITY
End: 2021-07-13

## 2021-07-13 NOTE — ASSESSMENT
[FreeTextEntry1] : The patient had surgery without issues . She has normal LV systolic functin and is going for RT ad chemotherapy . The patient is going to be getting cardiotoxic medication with Adriamycin and Cytoxan . She will need Surveillance for LV systolic function during her course

## 2021-07-13 NOTE — HISTORY OF PRESENT ILLNESS
[FreeTextEntry1] : The patient has right breast CA Stage II . The patient is going for chemo and RT . She will be receiving cardiotoxic medication  The patient has had no chest pain or SOB and she had done very wellwith surgery .

## 2021-07-13 NOTE — REVIEW OF SYSTEMS
[SOB] : no shortness of breath [Dyspnea on exertion] : not dyspnea during exertion [Chest Discomfort] : no chest discomfort [Palpitations] : palpitations [Joint Pain] : joint pain [Anxiety] : anxiety [Negative] : Heme/Lymph

## 2021-07-13 NOTE — CARDIOLOGY SUMMARY
[de-identified] : 5- NSR Normal ECG .  [de-identified] : 6-9-2021 ETT Echo Completed 7 minutes and 31 seconds . No echo ischemia .abnrmal ECG response  [de-identified] : N

## 2021-07-14 ENCOUNTER — APPOINTMENT (OUTPATIENT)
Dept: PLASTIC SURGERY | Facility: CLINIC | Age: 46
End: 2021-07-14
Payer: COMMERCIAL

## 2021-07-14 PROCEDURE — 99024 POSTOP FOLLOW-UP VISIT: CPT

## 2021-07-14 NOTE — HISTORY OF PRESENT ILLNESS
[FreeTextEntry1] : 46 yo   F with PMHx of anxiety/depression, HTN, GERD, asthma who was recently dx with RIGHT invasive ductal carcinoma and DCIS 2.4 cm @ 10:00 (ER+/UT+/HER2(-)) after palpable mass in right breast.  Denies breast pain, nipple discharge and retraction.  Here with friend, Rukhsana.\par \par Patient is learning toward bilateral mastectomy.  She is inquiring about immediate pre-pec implant based reconstruction.  She would like simplest recovery. 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 lives alone (2 cats); post-op assistance from friends\par \par Wears 34B, cup not filled.  She would like a full B in reconstructive volume.\par Recent lost weight 8 lbs. currently 118 lbs.  Mother passed away recently 2 months ago.\par \par Interval hx (21). Patient presents today POD#8 s/p BL SSM, Rt axillary dissection with immediate prepectoral TE reconstruction (filled to 100cc) with AlloDerm. Doing very well with adequate pain management. Taking oral antibiotics as prescribed. Denies any f/c or bleeding. Drains all functional with appropriate output. Rt axillary drain with minimal 5-7 cc daily. \par \par Interval hx (21). Patient presents today POD#13 s/p BL SSM, Rt axillary dissection with immediate prepectoral TE reconstruction (filled to 100cc) with AlloDerm. Doing well with no new concerns. Denies any f/c or bleeding. Drains functional with decreasing output as expected. \par \par Interval hx (21): Pt presents today POD #17 s/p BL SSM, Rt axillary dissection with immediate prepectoral TE reconstruction (filled to 100cc) with AlloDerm. Feeling well after first TE fill. Remaining right drain output: . Denies f/c, redness, swelling, CP/SOB, N/V, or calf pain.\par \par Interval hx (21): Pt presents today POD #22 s/p BL SSM, Rt axillary dissection with immediate prepectoral TE reconstruction (filled to 100cc) with AlloDerm. Saw Dr. Macias  who discussed CTX x12 weeks and recommended RT consult given 3 positive LNs. \par \par Interval hx (21): Pt presents today POD #29 s/p BL SSM, Rt axillary dissection with immediate prepectoral TE reconstruction (filled to 100cc) with AlloDerm. Continues to do well with no significant discomfort. Had chemoport placed on Monday and scheduled to commence CTX later this week. Denies any f/c, bleeding or pressure after TE fills.

## 2021-07-14 NOTE — PHYSICAL EXAM
[de-identified] : well-appearing, NAD [de-identified] : Mild scoliosis\par Prominent right costal chest vs left, left upper chest with chemoport in place, c/d/i [de-identified] : Bilateral breasts soft, mastectomy flaps healthy, viable and well perfused with excellent cap refill, incisions healing well, c/d/i, no erythema or tissue ischemia, tissue expanders in good position\par

## 2021-07-14 NOTE — DATA REVIEWED
[FreeTextEntry1] : Pathology             Final\par \par No Documents Attached\par \par \par \par   [facility] Accession Number : [id number]\par \par LADONNA GUZMAN E                         5\par \par \par \par Surgical Final Report\par \par \par Final Diagnosis\par 1. Breast, left, simple skin sparing mastectomy:\par - Focal atypical lobular hyperplasia (ALH).\par - Fibrofatty breast tissue with proliferative type fibrocystic\par changes including usual type duct hyperplasia, stromal fibrosis\par with foci of pseudoangiomatous stromal hyperplasia (PASH),\par sclerosing adenosis, cystic/papillary apocrine metaplasia, focal\par fibroadenomatoid mastopathy (sclerosing lobular hyperplasia), and\par rare microcalcifications.\par - Focal mammary duct ectasia.\par - Unremarkable nipple, skin and deep fascial margin including\par skeletal muscle.\par \par 2. Breast, left axillary sentinel lymph node #1, biopsy:\par - One benign lymph node (0/1). Negative for carcinoma with\par evaluation of multiple H T E levels and with negative\par immunohistochemical stains for cytokeratins (CK AE1/AE3 and CK\par 8/18).\par \par 3. Breast, right anterior margin, excision:\par - Benign predominantly fatty breast tissue and fresh hemorrhage\par without histopathologic abnormality. Negative for carcinoma.\par \par 4. Breast, right axillary sentinel lymph node #1, biopsy:\par - Micro-metastatic carcinoma present in one lymph node (1/1), the\par largest contiguous focus of which measures 1.5 mm (microscopic\par measurement), supported by positive immunohistochemical stains\par for cytokeratins (CK AE1/AE3 and CK 8/18).\par - No extracapsular extension is seen.\par \par 5. Breast, right, skin sparing modified radical mastectomy:\par - Healing prior biopsy site in the upper outer quadrant with\par invasive poorly differentiated ductal carcinoma, 3.5 cm\par (macroscopic measurement), associated with focal necrosis.\par - Non-extensive ductal carcinoma in-situ (DCIS), solid and\par comedo types associated with calcifications, high nuclear grade.\par - Numerous foci of lymphovascular invasion are seen.\par - The invasive tumor extends to touch a blue inked surface.\par - Focal atypical lobular hyperplasia (ALH) and proliferative\par type fibrocystic changes associated with microcalcifications.\par - Unremarkable nipple, skin, and deep fascial margin. Negative\par for carcinoma.\par - For hormone receptor and oncoprotein expression/gene\par amplification status please see the pathology report from the\par prior needle core biopsy specimen ([id number]).\par - AJCC 8th Edition Pathologic Stage: pT2, pN1a, pMx.\par \par 6. Breast, right axillary contents, excision:\par - Metastatic carcinoma present in two of twenty-four axillary\par lymph nodes (2/24), the largest contiguous focus of which\par measures 2.2 mm (microscopic measurement).\par - No extracapsular extension is seen.\par - Adjacent benign fibroadipose connective tissue and skeletal\par muscle\par containing a scant amount of ectopic/accessory benign fatty\par breast tissue.\par \par Verified by: Jesus Correa M.D.\par (Electronic Signature)\par Reported on: 06/22/21 15:43 EDT, [address] [place] [address]\par Phone: [phone]   Fax: [phone]\par _________________________________________________________________\par \par Intraoperative Consultation\par The specimen is submitted for INTRAOPERATIVE CONSULTATION and the\par FROZEN SECTION diagnosis is reported at [address] , [place]\par [address] as:\par \par 2. One benign sentinel lymph node (0/1)\par \par Received at: 10:20 am\par Verbally reported at: 10:44 am by: Dr. PRETTY Correa to: Dr. SOUSA\par Colleen\par \par 4. Micrometastatic carcinoma in one sentinel lymph\par node (1/1), 1.5 mm\par \par Received at: 11:10 am\par Verbally reported at: 11:36 am by: Dr. PRETTY Correa to: Dr. SOUSA\par Colleen\par \par Comment\par Case reported to Tumor Registry.\par \par Clinical History\par Bilateral breast skin sparing mastectomy, sentinel node mapping\par and biopsy, possible axillary lymph node dissection\par COVID(-)\par \par Specimen(s) Submitted\par 1     Left breast\par Time obtained: 10:04 am\par 2     Left breast sentinel lymph node\par Time obtained: 10:10 am\par 3     Right breast anterior margin\par Time obtained: 10:30 am\par 4     Right breast sentinel lymph node\par Time obtained: 10:53 am\par 5     Right breast\par Time obtained: 11:14 am\par 6     Right axillary contents\par Time obtained: 12:30 pm\par \par Gross Description\par 1. The specimen is received fresh, labeled "left breast" and\par consists of a left mastectomy specimen measuring 15 x 14 x 2 cm\par and weighing 250 grams. There is an attached skin ellipse with a\par nipple. The skin ellipse measures 7 x 2 x 0.2 cm and is grossly\par unremarkable. The nipple measures 1 x 1 x 1 cm. The specimen is\par oriented by sutures as follows: short-superior, long-lateral. The\par specimen is inked as follows: upper outer quadrant-blue, lower\par outer quadrant-yellow, lower inner quadrant-orange, upper inner\par quadrant-green, posterior-black. The specimen is serially\par sectioned from medial to lateral to reveal white fibrous breast\par tissue admixed with yellow adipose tissue. No nodularities or\par masses are found on sectioning. Representative sections are\par submitted.\par \par Summary of Sections:\par \par 1A - nipple serially sectioned -1\par 1B - nipple en face -1\par 1C - upper outer quadrant  section -1\par 1D - upper inner quadrant section -1\par 1E - lower outer quadrant section -1\par 1F - lower inner quadrant section -1\par 1G - representative sections of skin -1\par 1H - posterior/deep margin section -1\par \par Total: 8 blocks\par \par \par 2. The specimen is received fresh, labeled "left breast sentinel\par node #1" and consists of a fragment of fibroadipose tissue,\par measuring 1.1 x 0.8 x 0.3 cm. One lymph node is identified,\par measuring 1 x 0.9 x 0.3 cm. The lymph node is bisected. The\par specimen is submitted entirely.\par \par Summary of Sections:\par 2FSA - Frozen section control -1\par 2A - Remainder fibroadipose tissue -1\par \par Total: 2 blocks\par \par 3. The specimen is received fresh, labeled "right breast anterior\par margin, stitch marks new anterior margin" and consists of a\par fragment of tan yellow lobulated adipose tissue, weighing 1 gm\par and measuring 2.2 x 1.5 x 0.8 cm.  The sutured new margin is\par inked red.  The opposite side is inked black. The cut surface\par appears uniform yellow.  The specimen is submitted entirely. (2\par blocks)\par \par \par 4. The specimen is received fresh, labeled "right breast sentinel\par node #1" and consists of a fragment of fibroadipose tissue,\par measuring 2.5 x 1 x 0.6 cm. One possible lymph node is\par identified, measuring 2.3 x 1.0 x 0.6 cm. The lymph node is\par serially sectioned. The specimen is submitted entirely.\par \par Summary of Sections:\par 4FSA - Frozen section control -1\par 4A - Remainder of fibroadipose tissue -1\par \par Total blocks - 2\par \par 5. The specimen is received fresh, labeled "right breast" and\par consists of a right breast mastectomy specimen measuring 15 x 2.5\par x 1.5 cm and weighing 435 grams. There is an attached tan\par wrinkled skin ellipse with nipple. The skin ellipse measures 6 x\par 3 x 0.2 cm and is unremarkable. The nipple measures 1 x 1 x 1 cm.\par The specimen is oriented by sutures as follows: short-superior,\par long-lateral. The specimen is inked as follows: upper outer\par quadrant-blue, lower outer quadrant-yellow, lower inner quadrant-\par orange, upper inner quadrant-green, and posterior-black. The\par specimen is serially sectioned from medial to lateral. There is a\par firm mass  in the upper outer quadrant that measures 3.5 x 2 x1\par cm. The mass is 0.7 cm from the upper outer quadrant margin and\par 0.5 cm grossly from the deep margin. The mass is serially\par sectioned to reveal a 2 mm long infinity-shaped clip. The mass is\par tan white and fibrous admixed with blood clots from an apparent\par previous biopsy cavity. The remainder of the specimen is\par unremarkable yellow adipose tissue. Representative sections are\par submitted.\par \par Summary of Sections:\par \par 5A - nipple -1\par 5B - nipple base -1\par 5C -5L - tumor - 10 (clips biopsy site 5F)\par 5M - upper outer quadrant -1\par 5N - upper inner quadrant -1\par 5O - lower outer quadrant -1\par 5P - lower inner quadrant -1\par 5Q - representative section of skin -1\par 5R - posterior/deep margin -1\par \par Total: 18 blocks\par \par 6. The specimen is received fresh, labeled "right axillary\par contents" and consists of multiple fragments of lobulated yellow\par adipose tissue measuring in aggregate 7 x 5 x 1.5 cm and weighing\par 12 grams. On palpation and dissection are multiple lymph nodes\par within the specimen. The largest lymph node measures 1.5 cm in\par diameter. Representative sections are submitted.\par \par Summary of Sections:\par \par 6A - largest lymph node bisected -1\par 6B-6L - smaller lymph nodes - 11\par \par Total: 12 blocks\par \par Specimen was received and underwent gross examination at [facility], [address] [place] [address],\[place] [address].\par \par 06/15/2021 10:38:29 EDT bc\par 06/16/2021 09:18:26 EDT mt\par 06/16/2021 11:46:45 EDT jl\par \par Perioperative Diagnosis\par IDC right\par \par  \par \par  Ordered by: TALA SETHI       Collected/Examined: 15Jun2021 10:04AM       \par Verification Required       Stage: Final       \par  Performed at: [facility]       Resulted: 22Jun2021 03:43PM       Last Updated: 22Jun2021 03:43PM       Accession: [id number]

## 2021-07-14 NOTE — ASSESSMENT
[FreeTextEntry1] : 44 yo F with right breast cancer who is POD#29 s/p BL SSM, Rt axillary LN dissection (Dr. Macias) with immediate prepectoral TE reconstruction (filled to 100cc) with AlloDerm. Doing very well. \par \par Tolerating serial TE fill, total 210 cc b/l. \par \par -May start sports bra\par -Radiation oncology consultation as scheduled \par -Post-op instructions discussed and all questions answered \par -F/u 10 days for TE fill with Dr. Gallegos\par \par Due to COVID-19, pre-visit patient instructions were explained to the patient and their symptoms were checked upon arrival. Masks were used by the healthcare provider and staff and the examination room was cleaned after the patient visit concluded\par \par \par

## 2021-07-14 NOTE — PROCEDURE
[FreeTextEntry1] : s/p BL TE reconstruction  [FreeTextEntry2] : Tissue expander fill  [FreeTextEntry6] : The port location was identified and marked on the skin with the aid of the implant magnet. The skin was prepped in sterile fashion. Sterile saline was injected. The skin flaps remained well perfused, pink with good capillary refill <2 sec. The patient tolerated the procedure. \par \par Left mastectomy 250 g; Right mastectomy 435 g\par \par Right TE pre-expansion volume: 160 cc\par Total volume right TE after expansion : 210 cc\par \par Left TE pre-expansion volume: 160 cc\par Total volume left TE after expansion : 210 cc

## 2021-07-15 ENCOUNTER — APPOINTMENT (OUTPATIENT)
Dept: RADIATION ONCOLOGY | Facility: HOSPITAL | Age: 46
End: 2021-07-15
Payer: COMMERCIAL

## 2021-07-15 ENCOUNTER — APPOINTMENT (OUTPATIENT)
Dept: OBGYN | Facility: CLINIC | Age: 46
End: 2021-07-15
Payer: COMMERCIAL

## 2021-07-15 VITALS — WEIGHT: 124.56 LBS | TEMPERATURE: 98 F | HEIGHT: 66 IN | BODY MASS INDEX: 20.02 KG/M2

## 2021-07-15 VITALS
HEART RATE: 94 BPM | WEIGHT: 122 LBS | OXYGEN SATURATION: 100 % | RESPIRATION RATE: 12 BRPM | BODY MASS INDEX: 19.69 KG/M2 | SYSTOLIC BLOOD PRESSURE: 138 MMHG | TEMPERATURE: 97.4 F | DIASTOLIC BLOOD PRESSURE: 84 MMHG

## 2021-07-15 DIAGNOSIS — N89.8 OTHER SPECIFIED NONINFLAMMATORY DISORDERS OF VAGINA: ICD-10-CM

## 2021-07-15 DIAGNOSIS — Z01.411 ENCOUNTER FOR GYNECOLOGICAL EXAMINATION (GENERAL) (ROUTINE) WITH ABNORMAL FINDINGS: ICD-10-CM

## 2021-07-15 PROCEDURE — 99204 OFFICE O/P NEW MOD 45 MIN: CPT

## 2021-07-15 PROCEDURE — 81003 URINALYSIS AUTO W/O SCOPE: CPT | Mod: QW

## 2021-07-15 PROCEDURE — 99386 PREV VISIT NEW AGE 40-64: CPT

## 2021-07-15 PROCEDURE — 99072 ADDL SUPL MATRL&STAF TM PHE: CPT

## 2021-07-15 RX ORDER — CEPHALEXIN 500 MG/1
500 TABLET ORAL EVERY 6 HOURS
Qty: 12 | Refills: 0 | Status: DISCONTINUED | COMMUNITY
Start: 2021-07-07 | End: 2021-07-15

## 2021-07-15 RX ORDER — CEPHALEXIN 500 MG/1
500 CAPSULE ORAL 4 TIMES DAILY
Qty: 28 | Refills: 1 | Status: DISCONTINUED | COMMUNITY
Start: 2021-06-07 | End: 2021-07-15

## 2021-07-15 NOTE — REVIEW OF SYSTEMS
[Patient Intake Form Reviewed] : Patient intake form was reviewed [Negative] : Allergic/Immunologic [FreeTextEntry2] : 15 weight loss ( in 4 months) base weight 125 lbs [FreeTextEntry8] : overactive bladder

## 2021-07-15 NOTE — OB/GYN HISTORY
[Definite:  ___ (Date)] : the last menstrual period was [unfilled] [___] : Living: [unfilled] [Experiencing Menopausal Sxs] : not experiencing menopausal symptoms

## 2021-07-15 NOTE — PHYSICAL EXAM
[Sclera] : the sclera and conjunctiva were normal [Hearing Threshold Finger Rub Not [place]] : hearing was normal [Heart Rate And Rhythm] : heart rate and rhythm were normal [Heart Sounds] : normal S1 and S2 [Murmurs] : no murmurs present [Edema] : no peripheral edema present [Abdomen Soft] : soft [Nondistended] : nondistended [Abdomen Tenderness] : non-tender [Cervical Lymph Nodes Enlarged Posterior Bilaterally] : posterior cervical [Cervical Lymph Nodes Enlarged Anterior Bilaterally] : anterior cervical [Supraclavicular Lymph Nodes Enlarged Bilaterally] : supraclavicular [Axillary Lymph Nodes Enlarged Bilaterally] : axillary [No Focal Deficits] : no focal deficits [Normal] : no palpable adenopathy [de-identified] : She is examined in both the upright and supine positions.  S/p bilateral mastectomies and TE implant reconstruction (fluid filled).  Good symmetry.   No palpable chestwall nodules.   [de-identified] : Left CW portacath

## 2021-07-15 NOTE — LETTER CLOSING
[Consult Closing:] : Thank you for allowing me to participate in the care of this patient.  If you have any questions, please do not hesitate to contact me. [Sincerely yours,] : Sincerely yours, [FreeTextEntry3] : Elin Shah M.D. \par \par Electronically proofread and signed by:  Elin Shah MD\par Attending, Department of Radiation Medicine\par [facility]\par \par CC: Dr. Macias

## 2021-07-15 NOTE — DISEASE MANAGEMENT
[Pathological] : TNM Stage: p [IIA] : IIA [FreeTextEntry4] : invasive ductal carcinoma of the right breast, upper outer quadrant, G3, ER /IL positive / HER 2 negative  [TTNM] : 2 [NTNM] : 1a [MTNM] : 0 [de-identified] : right breast

## 2021-07-15 NOTE — HISTORY OF PRESENT ILLNESS
[FreeTextEntry1] : \par The patient is a 45 year old woman who works here at [employer].  She noted a palpable lump in the right breast in April.  Diagnostic mammogram and ultrasound on 4/29/2021 showed at 10:00 o'clock, N4 position, there is an irregular hypoechoic mass measuring 2.4 x 1.7 x 1.1 cm. This corresponds in size, shape and position to the mammographic finding in the region of clinical concern. No right axillary lymphadenopathy. Irregular hypoechoic mass in the right breast is highly suspicious for malignancy.  Additional small oval hypoechoic mass in the right breast is suspicious.  BI-RADS Category 5: Highly Suggestive of Malignancy.  Recommendation: Ultrasound guided biopsy x 2.\par \par On 4/29/2021, she had a biopsy of the right breast abnormality at 10 o’clock, 4 cm FN and pathology showed invasive poorly differentiated ductal carcinoma with scattered single cell necrosis, with DCIS.  It was ER/MN positive /HER 2 negative.  Ki-67 index was 30%.  At, right breast, 10 o'clock, 11 cm FN, it was benign breast tissue.  \par \par 5/5/2021 PET CT showed:  IMPRESSION:\par Pathologic FDG uptake (SUV 16.1) coregistering with right breast mass (biopsy-proven carcinoma).\par Nonspecific small focal FDG uptake (SUV 3.6) which appears to coregister within the left thyroid lobe.\par No additional definite sites of abnormal FDG uptake.\par \par She also had an MRI of bilateral breasts on 5/7/2021, which showed area of biopsy-proven enhancing malignant mass in the right breast. No additional area of abnormal enhancement in the right breast. No MRI evidence of malignancy in the left breast. Recommendation: Management as clinically appropriate.BI-RADS Category 6: Known Biopsy-Proven Malignancy.\par \par On 6/15/2021, she underwent bilateral skin sparing mastectomies, bilat SLN bx plus right ALND, and bilateral immediate TE reconstructions.  She was found to have a 3.5 cm  invasive ductal carcinoma in the right breast, G3 with DCIS, negative for EIC, ER/MN positive, HER 2 negative.  Surgical margins were negative.  1 of 2 sentinel lymph nodes and 2/24 axillary nodes were positive for metastatic carcinoma (Total 3/26LN).  There was LVSI present.  The left breast and SLN were benign.    \par \par She is scheduled to start chemotherapy on 7/16/2021 with Dr. Little,  ACT x 4 cycles  and Taxol  12 cycles , LCW medi-port  placed on 7/12/2021.\par \par She has been doing well since surgery.  She is here to discuss radiation. \par \par Med/Onc: Dr. Little\par Dr. Macias

## 2021-07-16 ENCOUNTER — APPOINTMENT (OUTPATIENT)
Dept: INFUSION THERAPY | Facility: CLINIC | Age: 46
End: 2021-07-16

## 2021-07-16 ENCOUNTER — OUTPATIENT (OUTPATIENT)
Dept: OUTPATIENT SERVICES | Facility: HOSPITAL | Age: 46
LOS: 1 days | Discharge: HOME | End: 2021-07-16

## 2021-07-16 ENCOUNTER — NON-APPOINTMENT (OUTPATIENT)
Age: 46
End: 2021-07-16

## 2021-07-16 ENCOUNTER — LABORATORY RESULT (OUTPATIENT)
Age: 46
End: 2021-07-16

## 2021-07-16 VITALS
DIASTOLIC BLOOD PRESSURE: 84 MMHG | WEIGHT: 124 LBS | SYSTOLIC BLOOD PRESSURE: 133 MMHG | HEART RATE: 77 BPM | HEIGHT: 65.5 IN | TEMPERATURE: 98.4 F | RESPIRATION RATE: 16 BRPM | BODY MASS INDEX: 20.41 KG/M2

## 2021-07-16 DIAGNOSIS — R63.4 ABNORMAL WEIGHT LOSS: ICD-10-CM

## 2021-07-16 DIAGNOSIS — C50.411 MALIGNANT NEOPLASM OF UPPER-OUTER QUADRANT OF RIGHT FEMALE BREAST: ICD-10-CM

## 2021-07-16 DIAGNOSIS — Z90.49 ACQUIRED ABSENCE OF OTHER SPECIFIED PARTS OF DIGESTIVE TRACT: Chronic | ICD-10-CM

## 2021-07-16 DIAGNOSIS — Z98.890 OTHER SPECIFIED POSTPROCEDURAL STATES: Chronic | ICD-10-CM

## 2021-07-16 DIAGNOSIS — N63.10 UNSPECIFIED LUMP IN THE RIGHT BREAST, UNSPECIFIED QUADRANT: ICD-10-CM

## 2021-07-16 DIAGNOSIS — Z51.11 ENCOUNTER FOR ANTINEOPLASTIC CHEMOTHERAPY: ICD-10-CM

## 2021-07-16 DIAGNOSIS — Z90.13 ACQUIRED ABSENCE OF BILATERAL BREASTS AND NIPPLES: Chronic | ICD-10-CM

## 2021-07-16 DIAGNOSIS — R59.9 ENLARGED LYMPH NODES, UNSPECIFIED: ICD-10-CM

## 2021-07-16 DIAGNOSIS — C50.919 MALIGNANT NEOPLASM OF UNSPECIFIED SITE OF UNSPECIFIED FEMALE BREAST: ICD-10-CM

## 2021-07-16 DIAGNOSIS — M67.40 GANGLION, UNSPECIFIED SITE: Chronic | ICD-10-CM

## 2021-07-16 LAB
HCT VFR BLD CALC: 34.3 %
HGB BLD-MCNC: 10.9 G/DL
MCHC RBC-ENTMCNC: 28.6 PG
MCHC RBC-ENTMCNC: 31.8 G/DL
MCV RBC AUTO: 90 FL
PLATELET # BLD AUTO: 271 K/UL
PMV BLD: 9.7 FL
RBC # BLD: 3.81 M/UL
RBC # FLD: 14 %
WBC # FLD AUTO: 9.76 K/UL

## 2021-07-16 RX ORDER — DOXORUBICIN HYDROCHLORIDE 2 MG/ML
97 INJECTION, SOLUTION INTRAVENOUS ONCE
Refills: 0 | Status: COMPLETED | OUTPATIENT
Start: 2021-07-16 | End: 2021-07-16

## 2021-07-16 RX ORDER — FOSAPREPITANT DIMEGLUMINE 150 MG/5ML
150 INJECTION, POWDER, LYOPHILIZED, FOR SOLUTION INTRAVENOUS ONCE
Refills: 0 | Status: COMPLETED | OUTPATIENT
Start: 2021-07-16 | End: 2021-07-16

## 2021-07-16 RX ORDER — GOSERELIN ACETATE 10.8 MG/1
3.6 IMPLANT SUBCUTANEOUS ONCE
Refills: 0 | Status: COMPLETED | OUTPATIENT
Start: 2021-07-16 | End: 2021-07-16

## 2021-07-16 RX ORDER — DEXAMETHASONE 0.5 MG/5ML
10 ELIXIR ORAL ONCE
Refills: 0 | Status: COMPLETED | OUTPATIENT
Start: 2021-07-16 | End: 2021-07-16

## 2021-07-16 RX ORDER — PEGFILGRASTIM-CBQV 6 MG/.6ML
6 INJECTION, SOLUTION SUBCUTANEOUS ONCE
Refills: 0 | Status: COMPLETED | OUTPATIENT
Start: 2021-07-16 | End: 2021-07-16

## 2021-07-16 RX ORDER — CYCLOPHOSPHAMIDE 100 MG
972 VIAL (EA) INTRAVENOUS ONCE
Refills: 0 | Status: COMPLETED | OUTPATIENT
Start: 2021-07-16 | End: 2021-07-16

## 2021-07-16 RX ADMIN — PEGFILGRASTIM-CBQV 6 MILLIGRAM(S): 6 INJECTION, SOLUTION SUBCUTANEOUS at 11:08

## 2021-07-16 RX ADMIN — DOXORUBICIN HYDROCHLORIDE 582 MILLIGRAM(S): 2 INJECTION, SOLUTION INTRAVENOUS at 11:08

## 2021-07-16 RX ADMIN — Medication 118 MILLIGRAM(S): at 10:30

## 2021-07-16 RX ADMIN — GOSERELIN ACETATE 3.6 MILLIGRAM(S): 10.8 IMPLANT SUBCUTANEOUS at 10:30

## 2021-07-16 RX ADMIN — FOSAPREPITANT DIMEGLUMINE 300 MILLIGRAM(S): 150 INJECTION, POWDER, LYOPHILIZED, FOR SOLUTION INTRAVENOUS at 10:30

## 2021-07-16 RX ADMIN — Medication 298.6 MILLIGRAM(S): at 11:09

## 2021-07-17 LAB
BILIRUB UR QL STRIP: NEGATIVE
CLARITY UR: CLEAR
COLLECTION METHOD: NORMAL
GLUCOSE UR-MCNC: NEGATIVE
HCG UR QL: 0.2 EU/DL
HGB UR QL STRIP.AUTO: NORMAL
KETONES UR-MCNC: NEGATIVE
LEUKOCYTE ESTERASE UR QL STRIP: NORMAL
NITRITE UR QL STRIP: NEGATIVE
PH UR STRIP: 5.5
PROT UR STRIP-MCNC: NEGATIVE
SP GR UR STRIP: 1.02

## 2021-07-20 DIAGNOSIS — Z45.2 ENCOUNTER FOR ADJUSTMENT AND MANAGEMENT OF VASCULAR ACCESS DEVICE: ICD-10-CM

## 2021-07-20 LAB
A VAGINAE DNA VAG QL NAA+PROBE: NORMAL
BVAB2 DNA VAG QL NAA+PROBE: NORMAL
C KRUSEI DNA VAG QL NAA+PROBE: NEGATIVE
C KRUSEI DNA VAG QL NAA+PROBE: POSITIVE
C TRACH RRNA SPEC QL NAA+PROBE: NEGATIVE
HPV HIGH+LOW RISK DNA PNL CVX: NOT DETECTED
MEGA1 DNA VAG QL NAA+PROBE: NORMAL
N GONORRHOEA RRNA SPEC QL NAA+PROBE: NEGATIVE
T VAGINALIS RRNA SPEC QL NAA+PROBE: NEGATIVE

## 2021-07-26 ENCOUNTER — APPOINTMENT (OUTPATIENT)
Dept: PLASTIC SURGERY | Facility: CLINIC | Age: 46
End: 2021-07-26
Payer: COMMERCIAL

## 2021-07-26 PROCEDURE — 99024 POSTOP FOLLOW-UP VISIT: CPT

## 2021-07-26 NOTE — ASSESSMENT
[FreeTextEntry1] : 44 yo F with right breast cancer who is POD#41 s/p BL SSM, Rt axillary LN dissection (Dr. Macias) with immediate prepectoral TE reconstruction (filled to 100cc) with AlloDerm. Doing very well. \par \par Tolerating serial TE fill, total 260 cc b/l. \par \par -c/w sports bra\par -pt reports that she would like to be full B/C cup\par -Post-op instructions discussed and all questions answered \par -F/u 2 weeks before chemotherapy for TE fill with Dr. Gallegos\par \par Due to COVID-19, pre-visit patient instructions were explained to the patient and their symptoms were checked upon arrival. Masks were used by the healthcare provider and staff and the examination room was cleaned after the patient visit concluded\par \par \par

## 2021-07-26 NOTE — PHYSICAL EXAM
[de-identified] : well-appearing, NAD [de-identified] : Mild scoliosis\par Prominent right costal chest vs left, left upper chest with chemoport in place, c/d/i [de-identified] : Bilateral breasts soft, mastectomy flaps healthy, viable and well perfused with excellent cap refill, incisions healing well, c/d/i, no erythema or tissue ischemia, tissue expanders in good position\par

## 2021-07-26 NOTE — HISTORY OF PRESENT ILLNESS
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  Denies breast pain, nipple discharge and retraction.  Here with friend, Rukhsana.\par \par Patient is learning toward bilateral mastectomy.  She is inquiring about immediate pre-pec implant based reconstruction.  She would like simplest recovery. 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 lives alone (2 cats); post-op assistance from friends\par \par Wears 34B, cup not filled.  She would like a full B in reconstructive volume.\par Recent lost weight 8 lbs. currently 118 lbs.  Mother passed away recently 2 months ago.\par \par Interval hx (21). Patient presents today POD#8 s/p BL SSM, Rt axillary dissection with immediate prepectoral TE reconstruction (filled to 100cc) with AlloDerm. Doing very well with adequate pain management. Taking oral antibiotics as prescribed. Denies any f/c or bleeding. Drains all functional with appropriate output. Rt axillary drain with minimal 5-7 cc daily. \par \par Interval hx (21). Patient presents today POD#13 s/p BL SSM, Rt axillary dissection with immediate prepectoral TE reconstruction (filled to 100cc) with AlloDerm. Doing well with no new concerns. Denies any f/c or bleeding. Drains functional with decreasing output as expected. \par \par Interval hx (21): Pt presents today POD #17 s/p BL SSM, Rt axillary dissection with immediate prepectoral TE reconstruction (filled to 100cc) with AlloDerm. Feeling well after first TE fill. Remaining right drain output: . Denies f/c, redness, swelling, CP/SOB, N/V, or calf pain.\par \par Interval hx (21): Pt presents today POD #22 s/p BL SSM, Rt axillary dissection with immediate prepectoral TE reconstruction (filled to 100cc) with AlloDerm. Saw Dr. Macias  who discussed CTX x12 weeks and recommended RT consult given 3 positive LNs. \par \par Interval hx (21): Pt presents today POD #29 s/p BL SSM, Rt axillary dissection with immediate prepectoral TE reconstruction (filled to 100cc) with AlloDerm. Continues to do well with no significant discomfort. Had chemoport placed on Monday and scheduled to commence CTX later this week. Denies any f/c, bleeding or pressure after TE fills. \par \par Interval hx (21):  Here POD#41 s/p BL immediate prepec TE/Alloderm.  Started on chemotherapy, ; next cycle on .  Pt reports she will need RIGHT BREAST XRT.  Here for serial TE fill.   Denies fevers, chills, redness, swelling.

## 2021-07-26 NOTE — DATA REVIEWED
[FreeTextEntry1] : Pathology             Final\par \par No Documents Attached\par \par \par \par   [facility] Accession Number : [id number]\par \par LADONNA GUZMAN E                         5\par \par \par \par Surgical Final Report\par \par \par Final Diagnosis\par 1. Breast, left, simple skin sparing mastectomy:\par - Focal atypical lobular hyperplasia (ALH).\par - Fibrofatty breast tissue with proliferative type fibrocystic\par changes including usual type duct hyperplasia, stromal fibrosis\par with foci of pseudoangiomatous stromal hyperplasia (PASH),\par sclerosing adenosis, cystic/papillary apocrine metaplasia, focal\par fibroadenomatoid mastopathy (sclerosing lobular hyperplasia), and\par rare microcalcifications.\par - Focal mammary duct ectasia.\par - Unremarkable nipple, skin and deep fascial margin including\par skeletal muscle.\par \par 2. Breast, left axillary sentinel lymph node #1, biopsy:\par - One benign lymph node (0/1). Negative for carcinoma with\par evaluation of multiple H T E levels and with negative\par immunohistochemical stains for cytokeratins (CK AE1/AE3 and CK\par 8/18).\par \par 3. Breast, right anterior margin, excision:\par - Benign predominantly fatty breast tissue and fresh hemorrhage\par without histopathologic abnormality. Negative for carcinoma.\par \par 4. Breast, right axillary sentinel lymph node #1, biopsy:\par - Micro-metastatic carcinoma present in one lymph node (1/1), the\par largest contiguous focus of which measures 1.5 mm (microscopic\par measurement), supported by positive immunohistochemical stains\par for cytokeratins (CK AE1/AE3 and CK 8/18).\par - No extracapsular extension is seen.\par \par 5. Breast, right, skin sparing modified radical mastectomy:\par - Healing prior biopsy site in the upper outer quadrant with\par invasive poorly differentiated ductal carcinoma, 3.5 cm\par (macroscopic measurement), associated with focal necrosis.\par - Non-extensive ductal carcinoma in-situ (DCIS), solid and\par comedo types associated with calcifications, high nuclear grade.\par - Numerous foci of lymphovascular invasion are seen.\par - The invasive tumor extends to touch a blue inked surface.\par - Focal atypical lobular hyperplasia (ALH) and proliferative\par type fibrocystic changes associated with microcalcifications.\par - Unremarkable nipple, skin, and deep fascial margin. Negative\par for carcinoma.\par - For hormone receptor and oncoprotein expression/gene\par amplification status please see the pathology report from the\par prior needle core biopsy specimen ([id number]).\par - AJCC 8th Edition Pathologic Stage: pT2, pN1a, pMx.\par \par 6. Breast, right axillary contents, excision:\par - Metastatic carcinoma present in two of twenty-four axillary\par lymph nodes (2/24), the largest contiguous focus of which\par measures 2.2 mm (microscopic measurement).\par - No extracapsular extension is seen.\par - Adjacent benign fibroadipose connective tissue and skeletal\par muscle\par containing a scant amount of ectopic/accessory benign fatty\par breast tissue.\par \par Verified by: Jesus Correa M.D.\par (Electronic Signature)\par Reported on: 06/22/21 15:43 EDT, [address] [facility] [place] [address]\par Phone: [phone]   Fax: [phone]\par _________________________________________________________________\par \par Intraoperative Consultation\par The specimen is submitted for INTRAOPERATIVE CONSULTATION and the\par FROZEN SECTION diagnosis is reported at [address] , [place]\par [address] as:\par \par 2. One benign sentinel lymph node (0/1)\par \par Received at: 10:20 am\par Verbally reported at: 10:44 am by: Dr. PRETTY Correa to: Dr. SOUSA\par Colleen\par \par 4. Micrometastatic carcinoma in one sentinel lymph\par node (1/1), 1.5 mm\par \par Received at: 11:10 am\par Verbally reported at: 11:36 am by: Dr. PRETTY Correa to: Dr. SOUSA\par Colleen\par \par Comment\par Case reported to Tumor Registry.\par \par Clinical History\par Bilateral breast skin sparing mastectomy, sentinel node mapping\par and biopsy, possible axillary lymph node dissection\par COVID(-)\par \par Specimen(s) Submitted\par 1     Left breast\par Time obtained: 10:04 am\par 2     Left breast sentinel lymph node\par Time obtained: 10:10 am\par 3     Right breast anterior margin\par Time obtained: 10:30 am\par 4     Right breast sentinel lymph node\par Time obtained: 10:53 am\par 5     Right breast\par Time obtained: 11:14 am\par 6     Right axillary contents\par Time obtained: 12:30 pm\par \par Gross Description\par 1. The specimen is received fresh, labeled "left breast" and\par consists of a left mastectomy specimen measuring 15 x 14 x 2 cm\par and weighing 250 grams. There is an attached skin ellipse with a\par nipple. The skin ellipse measures 7 x 2 x 0.2 cm and is grossly\par unremarkable. The nipple measures 1 x 1 x 1 cm. The specimen is\par oriented by sutures as follows: short-superior, long-lateral. The\par specimen is inked as follows: upper outer quadrant-blue, lower\par outer quadrant-yellow, lower inner quadrant-orange, upper inner\par quadrant-green, posterior-black. The specimen is serially\par sectioned from medial to lateral to reveal white fibrous breast\par tissue admixed with yellow adipose tissue. No nodularities or\par masses are found on sectioning. Representative sections are\par submitted.\par \par Summary of Sections:\par \par 1A - nipple serially sectioned -1\par 1B - nipple en face -1\par 1C - upper outer quadrant  section -1\par 1D - upper inner quadrant section -1\par 1E - lower outer quadrant section -1\par 1F - lower inner quadrant section -1\par 1G - representative sections of skin -1\par 1H - posterior/deep margin section -1\par \par Total: 8 blocks\par \par \par 2. The specimen is received fresh, labeled "left breast sentinel\par node #1" and consists of a fragment of fibroadipose tissue,\par measuring 1.1 x 0.8 x 0.3 cm. One lymph node is identified,\par measuring 1 x 0.9 x 0.3 cm. The lymph node is bisected. The\par specimen is submitted entirely.\par \par Summary of Sections:\par 2FSA - Frozen section control -1\par 2A - Remainder fibroadipose tissue -1\par \par Total: 2 blocks\par \par 3. The specimen is received fresh, labeled "right breast anterior\par margin, stitch marks new anterior margin" and consists of a\par fragment of tan yellow lobulated adipose tissue, weighing 1 gm\par and measuring 2.2 x 1.5 x 0.8 cm.  The sutured new margin is\par inked red.  The opposite side is inked black. The cut surface\par appears uniform yellow.  The specimen is submitted entirely. (2\par blocks)\par \par \par 4. The specimen is received fresh, labeled "right breast sentinel\par node #1" and consists of a fragment of fibroadipose tissue,\par measuring 2.5 x 1 x 0.6 cm. One possible lymph node is\par identified, measuring 2.3 x 1.0 x 0.6 cm. The lymph node is\par serially sectioned. The specimen is submitted entirely.\par \par Summary of Sections:\par 4FSA - Frozen section control -1\par 4A - Remainder of fibroadipose tissue -1\par \par Total blocks - 2\par \par 5. The specimen is received fresh, labeled "right breast" and\par consists of a right breast mastectomy specimen measuring 15 x 2.5\par x 1.5 cm and weighing 435 grams. There is an attached tan\par wrinkled skin ellipse with nipple. The skin ellipse measures 6 x\par 3 x 0.2 cm and is unremarkable. The nipple measures 1 x 1 x 1 cm.\par The specimen is oriented by sutures as follows: short-superior,\par long-lateral. The specimen is inked as follows: upper outer\par quadrant-blue, lower outer quadrant-yellow, lower inner quadrant-\par orange, upper inner quadrant-green, and posterior-black. The\par specimen is serially sectioned from medial to lateral. There is a\par firm mass  in the upper outer quadrant that measures 3.5 x 2 x1\par cm. The mass is 0.7 cm from the upper outer quadrant margin and\par 0.5 cm grossly from the deep margin. The mass is serially\par sectioned to reveal a 2 mm long infinity-shaped clip. The mass is\par tan white and fibrous admixed with blood clots from an apparent\par previous biopsy cavity. The remainder of the specimen is\par unremarkable yellow adipose tissue. Representative sections are\par submitted.\par \par Summary of Sections:\par \par 5A - nipple -1\par 5B - nipple base -1\par 5C -5L - tumor - 10 (clips biopsy site 5F)\par 5M - upper outer quadrant -1\par 5N - upper inner quadrant -1\par 5O - lower outer quadrant -1\par 5P - lower inner quadrant -1\par 5Q - representative section of skin -1\par 5R - posterior/deep margin -1\par \par Total: 18 blocks\par \par 6. The specimen is received fresh, labeled "right axillary\par contents" and consists of multiple fragments of lobulated yellow\par adipose tissue measuring in aggregate 7 x 5 x 1.5 cm and weighing\par 12 grams. On palpation and dissection are multiple lymph nodes\par within the specimen. The largest lymph node measures 1.5 cm in\par diameter. Representative sections are submitted.\par \par Summary of Sections:\par \par 6A - largest lymph node bisected -1\par 6B-6L - smaller lymph nodes - 11\par \par Total: 12 blocks\par \par Specimen was received and underwent gross examination at [facility], [address] [place] [address],\[place] [id number].\par \par 06/15/2021 10:38:29 EDT bc\par 06/16/2021 09:18:26 EDT mt\par 06/16/2021 11:46:45 EDT jl\par \par Perioperative Diagnosis\par IDC right\par \par  \par \par  Ordered by: TALA SETHI       Collected/Examined: 15Jun2021 10:04AM       \par Verification Required       Stage: Final       \par  Performed at: [facility]       Resulted: 22Jun2021 03:43PM       Last Updated: 22Jun2021 03:43PM       Accession: [id number]

## 2021-07-26 NOTE — PROCEDURE
[FreeTextEntry1] : s/p BL TE reconstruction  [FreeTextEntry2] : Tissue expander fill  [FreeTextEntry6] : The port location was identified and marked on the skin with the aid of the implant magnet. The skin was prepped in sterile fashion. Sterile saline was injected. The skin flaps remained well perfused, pink with good capillary refill <2 sec. The patient tolerated the procedure. \par \par Left mastectomy 250 g; Right mastectomy 435 g\par \par Right TE pre-expansion volume: 210 cc\par Total volume right TE after expansion : 260 cc\par \par Left TE pre-expansion volume: 210 cc\par Total volume left TE after expansion : 260 cc

## 2021-07-27 LAB — CYTOLOGY CVX/VAG DOC THIN PREP: ABNORMAL

## 2021-07-29 ENCOUNTER — LABORATORY RESULT (OUTPATIENT)
Age: 46
End: 2021-07-29

## 2021-07-29 ENCOUNTER — APPOINTMENT (OUTPATIENT)
Dept: HEMATOLOGY ONCOLOGY | Facility: CLINIC | Age: 46
End: 2021-07-29
Payer: COMMERCIAL

## 2021-07-29 VITALS
SYSTOLIC BLOOD PRESSURE: 136 MMHG | WEIGHT: 127 LBS | DIASTOLIC BLOOD PRESSURE: 85 MMHG | BODY MASS INDEX: 20.91 KG/M2 | HEART RATE: 83 BPM | HEIGHT: 65.5 IN | RESPIRATION RATE: 14 BRPM | TEMPERATURE: 97.3 F

## 2021-07-29 DIAGNOSIS — Z00.00 ENCOUNTER FOR GENERAL ADULT MEDICAL EXAMINATION W/OUT ABNORMAL FINDINGS: ICD-10-CM

## 2021-07-29 LAB
HCT VFR BLD CALC: 31.1 %
HGB BLD-MCNC: 10.1 G/DL
MCHC RBC-ENTMCNC: 28.9 PG
MCHC RBC-ENTMCNC: 32.5 G/DL
MCV RBC AUTO: 89.1 FL
PLATELET # BLD AUTO: 220 K/UL
PMV BLD: 9.9 FL
RBC # BLD: 3.49 M/UL
RBC # FLD: 14.8 %
WBC # FLD AUTO: 16.4 K/UL

## 2021-07-29 PROCEDURE — 99215 OFFICE O/P EST HI 40 MIN: CPT

## 2021-07-29 NOTE — ASSESSMENT
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 I reviewed the radiological findings, pathology and staging with her.\par We also discussed the Oncotype Dx results at length. Her Oncotype score is high and predicts significant benefit from chemotherapy.\par I reviewed the natural history of high grade HR+, HER2 neg breast cancer and how it differs from other types of breast cancer. I discussed that LN positive breast cancers tend to have a higher risk of systemic recurrence.\par therefore adjuvant chemotherapy and endocrine therapy is  usually recommended especially in premenopausal women.\par \par She is an appropriate candidate for adjuvant chemotherapy. We discussed the goal of such treatment is to improve risk of recurrence and overall survival.\par Multiple data sets have established the superiority of adjuvant anthracycline based chemotherapy in LN positive breast cancer.\par therefore she was recommended AC followed by Taxol, Her EF was nl\par ECHO has been reviewed. She has nl EF\par \par Adriamycin and cytoxan will be administered every 2 weeks X 4 with Neulasta support followed by taxol weekly x 12 \par \par She is s/p cycle #1 of AC, proceed with Cycle #2 of AC with Neulasta\par I discussed side effects which include but are not limited to nausea, vomiting, hair loss, bone marrow suppression, cytopenia, increased risk of infection, sepsis, diarrhea, fatigue, allergic reaction and peripheral neuropathy. Other side effects such as cardiac toxicity,bone marrow injury leading to MDS/AML, were also discussed, however these risks are very small. Overall the risk benefit ratio favors treatment with chemotherapy \par \par Monitoring will be with each cycle Q 2 weeks\par CBC (with differential and platelet count), serum electrolytes, serum creatinine and BUN, CrCl, LFTs; signs/symptoms of hypersensitivity reactions.\par \par SUPPORTIVE MEASURES\par Zofran 8mg Q 8 hrs prn for emesis and nausea.\par Compazine  Q 6hrs prn\par Encourage adequate fluid untake,\par GI prophylaxis with PPI\par \par \par \par Pt was seen by Rad/Onc.\par \par We also discussed need for endocrine therapy with OFS and AI.\par She was started on Zoladex  which she will continue Q month for now.\par I will add an AI after chemo and RT has been completed.\par Side effects of OFS were discussed including hot flashes, amenorrhea, bone loss, wt gain etc.\par \par \par Genetic testing did not reveal any mutations.\par \par All of her questions and concerns were addressed.\par \par Due to COVID 19, pre-visit patient instructions were explained to the patient and their symptoms were checked upon arrival. \par Masks were used by the health care providers and staff and the examination room was cleaned before and after the patient visit was completed.\par Pt was encouraged to get Covid vaccine.\par

## 2021-07-29 NOTE — CONSULT LETTER
[Dear  ___] : Dear  [unfilled], [Consult Letter:] : I had the pleasure of evaluating your patient, [unfilled]. [Please see my note below.] : Please see my note below. [Consult Closing:] : Thank you very much for allowing me to participate in the care of this patient.  If you have any questions, please do not hesitate to contact me. [Sincerely,] : Sincerely, [DrMele  ___] : Dr. KIM [FreeTextEntry3] : Wolfgang Little MD\par Professor [employer]\par [employer]\par Attending Physician\par [facility]\par [phone]\par \par

## 2021-07-29 NOTE — PHYSICAL EXAM
[Fully active, able to carry on all pre-disease performance without restriction] : Status 0 - Fully active, able to carry on all pre-disease performance without restriction [Normal] : affect appropriate [de-identified] : B/L breasts s/p mastectomy and TE placement  No erythema or tenderness. [de-identified] : bruise on LLQ of abdomen at the site of injection. Port site is clear

## 2021-07-29 NOTE — HISTORY OF PRESENT ILLNESS
[de-identified] : This is a 45 premenopausal woman being seen for wt loss.\par Pt has Lost 15 lbs in the last 6 mths. \par She has a good appetite.\par C/o having drenching  night sweats more often.\par She is known to have enlarged  neck Lns, saw ENt however could not get FNA as the nodes were too small.\par Had recent change in bowel habits over the last month or so with constipation and diarrhea. She has a follow up with GI.\par Has heavy periods, has fibroids, will see GYN \par She has an enlarged LNs in the groin\par colonoscopy done in 12/2/19 showed 7mm ascending colon polyp.\par EGD non erosive gastritis [de-identified] : ONCOTYPE DX RS 32.\par RR at 7 yrs 25%.\par With chemo benefit from chemo 15% [de-identified] : 5/11/21\par Pt is here for follow up.\par She is here to discuss results and further plan of care.\par She has completed breast biopsy.\par She has surgery appt today.\par She also has a GI appt later in the week\par \par 6/28/21\par Pt is here for follow up.\par She is s/p B/L mastectomy with TE insertion.\par There were no post op complications.\par She still has a drain in place.\par She is here to discuss adjuvant therapy.\par Pt still is menstruating.\par \par 7/29/21\par pt is here for follow up.\par She is s/p 1 cycle of AC. She tolerated it well. No N, V, D, mouth sores, fever.\par Has gastric reflux.\par Had bleeding post Zoladex injection

## 2021-07-30 ENCOUNTER — APPOINTMENT (OUTPATIENT)
Dept: INFUSION THERAPY | Facility: CLINIC | Age: 46
End: 2021-07-30

## 2021-07-30 RX ORDER — DOXORUBICIN HYDROCHLORIDE 2 MG/ML
97 INJECTION, SOLUTION INTRAVENOUS ONCE
Refills: 0 | Status: COMPLETED | OUTPATIENT
Start: 2021-07-30 | End: 2021-07-30

## 2021-07-30 RX ORDER — FOSAPREPITANT DIMEGLUMINE 150 MG/5ML
150 INJECTION, POWDER, LYOPHILIZED, FOR SOLUTION INTRAVENOUS ONCE
Refills: 0 | Status: COMPLETED | OUTPATIENT
Start: 2021-07-30 | End: 2021-07-30

## 2021-07-30 RX ORDER — DEXAMETHASONE 0.5 MG/5ML
10 ELIXIR ORAL ONCE
Refills: 0 | Status: COMPLETED | OUTPATIENT
Start: 2021-07-30 | End: 2021-07-30

## 2021-07-30 RX ORDER — CYCLOPHOSPHAMIDE 100 MG
972 VIAL (EA) INTRAVENOUS ONCE
Refills: 0 | Status: COMPLETED | OUTPATIENT
Start: 2021-07-30 | End: 2021-07-30

## 2021-07-30 RX ORDER — PEGFILGRASTIM-CBQV 6 MG/.6ML
6 INJECTION, SOLUTION SUBCUTANEOUS ONCE
Refills: 0 | Status: COMPLETED | OUTPATIENT
Start: 2021-07-30 | End: 2021-07-30

## 2021-07-30 RX ADMIN — Medication 972 MILLIGRAM(S): at 11:45

## 2021-07-30 RX ADMIN — FOSAPREPITANT DIMEGLUMINE 300 MILLIGRAM(S): 150 INJECTION, POWDER, LYOPHILIZED, FOR SOLUTION INTRAVENOUS at 09:38

## 2021-07-30 RX ADMIN — Medication 10 MILLIGRAM(S): at 09:38

## 2021-07-30 RX ADMIN — DOXORUBICIN HYDROCHLORIDE 582 MILLIGRAM(S): 2 INJECTION, SOLUTION INTRAVENOUS at 10:29

## 2021-07-30 RX ADMIN — PEGFILGRASTIM-CBQV 6 MILLIGRAM(S): 6 INJECTION, SOLUTION SUBCUTANEOUS at 11:30

## 2021-07-30 RX ADMIN — Medication 118 MILLIGRAM(S): at 09:18

## 2021-07-30 RX ADMIN — FOSAPREPITANT DIMEGLUMINE 150 MILLIGRAM(S): 150 INJECTION, POWDER, LYOPHILIZED, FOR SOLUTION INTRAVENOUS at 09:58

## 2021-07-30 RX ADMIN — Medication 298.6 MILLIGRAM(S): at 10:45

## 2021-08-02 ENCOUNTER — NON-APPOINTMENT (OUTPATIENT)
Age: 46
End: 2021-08-02

## 2021-08-02 ENCOUNTER — LABORATORY RESULT (OUTPATIENT)
Age: 46
End: 2021-08-02

## 2021-08-02 ENCOUNTER — APPOINTMENT (OUTPATIENT)
Dept: HEMATOLOGY ONCOLOGY | Facility: CLINIC | Age: 46
End: 2021-08-02

## 2021-08-02 DIAGNOSIS — N39.0 URINARY TRACT INFECTION, SITE NOT SPECIFIED: ICD-10-CM

## 2021-08-02 LAB
APPEARANCE: CLEAR
BILIRUBIN URINE: NEGATIVE
BLOOD URINE: ABNORMAL
COLOR: NORMAL
GLUCOSE QUALITATIVE U: NEGATIVE
KETONES URINE: NEGATIVE
LEUKOCYTE ESTERASE URINE: ABNORMAL
NITRITE URINE: NEGATIVE
PH URINE: 6.5
PROTEIN URINE: NEGATIVE
SPECIFIC GRAVITY URINE: 1.02
UROBILINOGEN URINE: NORMAL

## 2021-08-05 LAB — BACTERIA UR CULT: ABNORMAL

## 2021-08-09 ENCOUNTER — APPOINTMENT (OUTPATIENT)
Dept: PLASTIC SURGERY | Facility: CLINIC | Age: 46
End: 2021-08-09
Payer: COMMERCIAL

## 2021-08-09 PROCEDURE — 99024 POSTOP FOLLOW-UP VISIT: CPT

## 2021-08-09 NOTE — PROCEDURE
[FreeTextEntry1] : s/p BL TE reconstruction  [FreeTextEntry2] : Tissue expander fill  [FreeTextEntry6] : The port location was identified and marked on the skin with the aid of the implant magnet. The skin was prepped in sterile fashion. Sterile saline was injected. The skin flaps remained well perfused, pink with good capillary refill <2 sec. The patient tolerated the procedure. \par \par Left mastectomy 250 g; Right mastectomy 435 g\par \par Right TE pre-expansion volume: 260 cc\par Total volume right TE after expansion : 310 cc\par \par Left TE pre-expansion volume: 260 cc\par Total volume left TE after expansion : 310 cc

## 2021-08-09 NOTE — DATA REVIEWED
[FreeTextEntry1] : Pathology             Final\par \par No Documents Attached\par \par \par \par   [facility] Accession Number : [id number]\par \par LADONNA GUZMAN E                         5\par \par \par \par Surgical Final Report\par \par \par Final Diagnosis\par 1. Breast, left, simple skin sparing mastectomy:\par - Focal atypical lobular hyperplasia (ALH).\par - Fibrofatty breast tissue with proliferative type fibrocystic\par changes including usual type duct hyperplasia, stromal fibrosis\par with foci of pseudoangiomatous stromal hyperplasia (PASH),\par sclerosing adenosis, cystic/papillary apocrine metaplasia, focal\par fibroadenomatoid mastopathy (sclerosing lobular hyperplasia), and\par rare microcalcifications.\par - Focal mammary duct ectasia.\par - Unremarkable nipple, skin and deep fascial margin including\par skeletal muscle.\par \par 2. Breast, left axillary sentinel lymph node #1, biopsy:\par - One benign lymph node (0/1). Negative for carcinoma with\par evaluation of multiple H T E levels and with negative\par immunohistochemical stains for cytokeratins (CK AE1/AE3 and CK\par 8/18).\par \par 3. Breast, right anterior margin, excision:\par - Benign predominantly fatty breast tissue and fresh hemorrhage\par without histopathologic abnormality. Negative for carcinoma.\par \par 4. Breast, right axillary sentinel lymph node #1, biopsy:\par - Micro-metastatic carcinoma present in one lymph node (1/1), the\par largest contiguous focus of which measures 1.5 mm (microscopic\par measurement), supported by positive immunohistochemical stains\par for cytokeratins (CK AE1/AE3 and CK 8/18).\par - No extracapsular extension is seen.\par \par 5. Breast, right, skin sparing modified radical mastectomy:\par - Healing prior biopsy site in the upper outer quadrant with\par invasive poorly differentiated ductal carcinoma, 3.5 cm\par (macroscopic measurement), associated with focal necrosis.\par - Non-extensive ductal carcinoma in-situ (DCIS), solid and\par comedo types associated with calcifications, high nuclear grade.\par - Numerous foci of lymphovascular invasion are seen.\par - The invasive tumor extends to touch a blue inked surface.\par - Focal atypical lobular hyperplasia (ALH) and proliferative\par type fibrocystic changes associated with microcalcifications.\par - Unremarkable nipple, skin, and deep fascial margin. Negative\par for carcinoma.\par - For hormone receptor and oncoprotein expression/gene\par amplification status please see the pathology report from the\par prior needle core biopsy specimen ([id number]).\par - AJCC 8th Edition Pathologic Stage: pT2, pN1a, pMx.\par \par 6. Breast, right axillary contents, excision:\par - Metastatic carcinoma present in two of twenty-four axillary\par lymph nodes (2/24), the largest contiguous focus of which\par measures 2.2 mm (microscopic measurement).\par - No extracapsular extension is seen.\par - Adjacent benign fibroadipose connective tissue and skeletal\par muscle\par containing a scant amount of ectopic/accessory benign fatty\par breast tissue.\par \par Verified by: Jesus Correa M.D.\par (Electronic Signature)\par Reported on: 06/22/21 15:43 EDT, [address] [facility] [facility] [place] [address]\par Phone: [phone]   Fax: [phone]\par _________________________________________________________________\par \par Intraoperative Consultation\par The specimen is submitted for INTRAOPERATIVE CONSULTATION and the\par FROZEN SECTION diagnosis is reported at [address] , [place]\par [address] as:\par \par 2. One benign sentinel lymph node (0/1)\par \par Received at: 10:20 am\par Verbally reported at: 10:44 am by: Dr. PRETTY Correa to: Dr. SOUSA\par Colleen\par \par 4. Micrometastatic carcinoma in one sentinel lymph\par node (1/1), 1.5 mm\par \par Received at: 11:10 am\par Verbally reported at: 11:36 am by: Dr. PRETTY Correa to: Dr. SOUSA\par Colleen\par \par Comment\par Case reported to Tumor Registry.\par \par Clinical History\par Bilateral breast skin sparing mastectomy, sentinel node mapping\par and biopsy, possible axillary lymph node dissection\par COVID(-)\par \par Specimen(s) Submitted\par 1     Left breast\par Time obtained: 10:04 am\par 2     Left breast sentinel lymph node\par Time obtained: 10:10 am\par 3     Right breast anterior margin\par Time obtained: 10:30 am\par 4     Right breast sentinel lymph node\par Time obtained: 10:53 am\par 5     Right breast\par Time obtained: 11:14 am\par 6     Right axillary contents\par Time obtained: 12:30 pm\par \par Gross Description\par 1. The specimen is received fresh, labeled "left breast" and\par consists of a left mastectomy specimen measuring 15 x 14 x 2 cm\par and weighing 250 grams. There is an attached skin ellipse with a\par nipple. The skin ellipse measures 7 x 2 x 0.2 cm and is grossly\par unremarkable. The nipple measures 1 x 1 x 1 cm. The specimen is\par oriented by sutures as follows: short-superior, long-lateral. The\par specimen is inked as follows: upper outer quadrant-blue, lower\par outer quadrant-yellow, lower inner quadrant-orange, upper inner\par quadrant-green, posterior-black. The specimen is serially\par sectioned from medial to lateral to reveal white fibrous breast\par tissue admixed with yellow adipose tissue. No nodularities or\par masses are found on sectioning. Representative sections are\par submitted.\par \par Summary of Sections:\par \par 1A - nipple serially sectioned -1\par 1B - nipple en face -1\par 1C - upper outer quadrant  section -1\par 1D - upper inner quadrant section -1\par 1E - lower outer quadrant section -1\par 1F - lower inner quadrant section -1\par 1G - representative sections of skin -1\par 1H - posterior/deep margin section -1\par \par Total: 8 blocks\par \par \par 2. The specimen is received fresh, labeled "left breast sentinel\par node #1" and consists of a fragment of fibroadipose tissue,\par measuring 1.1 x 0.8 x 0.3 cm. One lymph node is identified,\par measuring 1 x 0.9 x 0.3 cm. The lymph node is bisected. The\par specimen is submitted entirely.\par \par Summary of Sections:\par 2FSA - Frozen section control -1\par 2A - Remainder fibroadipose tissue -1\par \par Total: 2 blocks\par \par 3. The specimen is received fresh, labeled "right breast anterior\par margin, stitch marks new anterior margin" and consists of a\par fragment of tan yellow lobulated adipose tissue, weighing 1 gm\par and measuring 2.2 x 1.5 x 0.8 cm.  The sutured new margin is\par inked red.  The opposite side is inked black. The cut surface\par appears uniform yellow.  The specimen is submitted entirely. (2\par blocks)\par \par \par 4. The specimen is received fresh, labeled "right breast sentinel\par node #1" and consists of a fragment of fibroadipose tissue,\par measuring 2.5 x 1 x 0.6 cm. One possible lymph node is\par identified, measuring 2.3 x 1.0 x 0.6 cm. The lymph node is\par serially sectioned. The specimen is submitted entirely.\par \par Summary of Sections:\par 4FSA - Frozen section control -1\par 4A - Remainder of fibroadipose tissue -1\par \par Total blocks - 2\par \par 5. The specimen is received fresh, labeled "right breast" and\par consists of a right breast mastectomy specimen measuring 15 x 2.5\par x 1.5 cm and weighing 435 grams. There is an attached tan\par wrinkled skin ellipse with nipple. The skin ellipse measures 6 x\par 3 x 0.2 cm and is unremarkable. The nipple measures 1 x 1 x 1 cm.\par The specimen is oriented by sutures as follows: short-superior,\par long-lateral. The specimen is inked as follows: upper outer\par quadrant-blue, lower outer quadrant-yellow, lower inner quadrant-\par orange, upper inner quadrant-green, and posterior-black. The\par specimen is serially sectioned from medial to lateral. There is a\par firm mass  in the upper outer quadrant that measures 3.5 x 2 x1\par cm. The mass is 0.7 cm from the upper outer quadrant margin and\par 0.5 cm grossly from the deep margin. The mass is serially\par sectioned to reveal a 2 mm long infinity-shaped clip. The mass is\par tan white and fibrous admixed with blood clots from an apparent\par previous biopsy cavity. The remainder of the specimen is\par unremarkable yellow adipose tissue. Representative sections are\par submitted.\par \par Summary of Sections:\par \par 5A - nipple -1\par 5B - nipple base -1\par 5C -5L - tumor - 10 (clips biopsy site 5F)\par 5M - upper outer quadrant -1\par 5N - upper inner quadrant -1\par 5O - lower outer quadrant -1\par 5P - lower inner quadrant -1\par 5Q - representative section of skin -1\par 5R - posterior/deep margin -1\par \par Total: 18 blocks\par \par 6. The specimen is received fresh, labeled "right axillary\par contents" and consists of multiple fragments of lobulated yellow\par adipose tissue measuring in aggregate 7 x 5 x 1.5 cm and weighing\par 12 grams. On palpation and dissection are multiple lymph nodes\par within the specimen. The largest lymph node measures 1.5 cm in\par diameter. Representative sections are submitted.\par \par Summary of Sections:\par \par 6A - largest lymph node bisected -1\par 6B-6L - smaller lymph nodes - 11\par \par Total: 12 blocks\par \par Specimen was received and underwent gross examination at [facility], [address] [place] [address],\[place] [address].\par \par 06/15/2021 10:38:29 EDT bc\par 06/16/2021 09:18:26 EDT mt\par 06/16/2021 11:46:45 EDT jl\par \par Perioperative Diagnosis\par IDC right\par \par  \par \par  Ordered by: TALA SETHI       Collected/Examined: 15Jun2021 10:04AM       \par Verification Required       Stage: Final       \par  Performed at: [facility]       Resulted: 22Jun2021 03:43PM       Last Updated: 22Jun2021 03:43PM       Accession: [id number]

## 2021-08-09 NOTE — ASSESSMENT
[FreeTextEntry1] : 44 yo F with right breast cancer who is POD#56 s/p BL SSM, Rt axillary LN dissection (Dr. Macias) with immediate prepectoral TE reconstruction (filled to 100cc) with AlloDerm. Doing very well. \par \par Tolerating serial TE fill, total 310 cc b/l (likely overfill volume)\par \par -c/w sports bra\par -pt reports that she would like to be full B/C cup\par -sleep supine\par -F/u 3 weeks before chemotherapy for possible TE volume confirmation, possible left minor additional overfill\par \par Due to COVID-19, pre-visit patient instructions were explained to the patient and their symptoms were checked upon arrival. Masks were used by the healthcare provider and staff and the examination room was cleaned after the patient visit concluded\par \par \par

## 2021-08-09 NOTE — HISTORY OF PRESENT ILLNESS
[FreeTextEntry1] : 46 yo   F with PMHx of anxiety/depression, HTN, GERD, asthma who was recently dx with RIGHT invasive ductal carcinoma and DCIS 2.4 cm @ 10:00 (ER+/ME+/HER2(-)) after palpable mass in right breast.  Denies breast pain, nipple discharge and retraction.  Here with friend, Rukhsana.\par \par Patient is learning toward bilateral mastectomy.  She is inquiring about immediate pre-pec implant based reconstruction.  She would like simplest recovery. 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 lives alone (2 cats); post-op assistance from friends\par \par Wears 34B, cup not filled.  She would like a full B in reconstructive volume.\par Recent lost weight 8 lbs. currently 118 lbs.  Mother passed away recently 2 months ago.\par \par Interval hx (21). Patient presents today POD#8 s/p BL SSM, Rt axillary dissection with immediate prepectoral TE reconstruction (filled to 100cc) with AlloDerm. Doing very well with adequate pain management. Taking oral antibiotics as prescribed. Denies any f/c or bleeding. Drains all functional with appropriate output. Rt axillary drain with minimal 5-7 cc daily. \par \par Interval hx (21). Patient presents today POD#13 s/p BL SSM, Rt axillary dissection with immediate prepectoral TE reconstruction (filled to 100cc) with AlloDerm. Doing well with no new concerns. Denies any f/c or bleeding. Drains functional with decreasing output as expected. \par \par Interval hx (21): Pt presents today POD #17 s/p BL SSM, Rt axillary dissection with immediate prepectoral TE reconstruction (filled to 100cc) with AlloDerm. Feeling well after first TE fill. Remaining right drain output: . Denies f/c, redness, swelling, CP/SOB, N/V, or calf pain.\par \par Interval hx (21): Pt presents today POD #22 s/p BL SSM, Rt axillary dissection with immediate prepectoral TE reconstruction (filled to 100cc) with AlloDerm. Saw Dr. Macias  who discussed CTX x12 weeks and recommended RT consult given 3 positive LNs. \par \par Interval hx (21): Pt presents today POD #29 s/p BL SSM, Rt axillary dissection with immediate prepectoral TE reconstruction (filled to 100cc) with AlloDerm. Continues to do well with no significant discomfort. Had chemoport placed on Monday and scheduled to commence CTX later this week. Denies any f/c, bleeding or pressure after TE fills. \par \par Interval hx (21):  Here POD#41 s/p BL immediate prepec TE/Alloderm.  Started on chemotherapy, ; next cycle on .  Pt reports she will need RIGHT BREAST XRT.  Here for serial TE fill.   Denies fevers, chills, redness, swelling.\par \par Interval hx (21):  Here for POD#56 s/p BL prepec TE/Alloderm.  s/p 2 cycles of chemo. s/p chemoport.

## 2021-08-09 NOTE — PHYSICAL EXAM
[de-identified] : well-appearing, NAD [de-identified] : Mild scoliosis\par Prominent right costal chest vs left, left upper chest with chemoport in place, c/d/i [de-identified] : Bilateral breasts soft, mastectomy flaps healthy, viable and well perfused with excellent cap refill, incisions healing well, c/d/i, no erythema or tissue ischemia, tissue expanders in good position\par

## 2021-08-12 ENCOUNTER — APPOINTMENT (OUTPATIENT)
Dept: HEMATOLOGY ONCOLOGY | Facility: CLINIC | Age: 46
End: 2021-08-12
Payer: COMMERCIAL

## 2021-08-12 ENCOUNTER — LABORATORY RESULT (OUTPATIENT)
Age: 46
End: 2021-08-12

## 2021-08-12 VITALS
BODY MASS INDEX: 21.07 KG/M2 | WEIGHT: 128 LBS | DIASTOLIC BLOOD PRESSURE: 75 MMHG | HEIGHT: 65.5 IN | HEART RATE: 78 BPM | SYSTOLIC BLOOD PRESSURE: 149 MMHG

## 2021-08-12 PROCEDURE — 99213 OFFICE O/P EST LOW 20 MIN: CPT

## 2021-08-13 ENCOUNTER — APPOINTMENT (OUTPATIENT)
Dept: INFUSION THERAPY | Facility: CLINIC | Age: 46
End: 2021-08-13

## 2021-08-13 RX ORDER — GOSERELIN ACETATE 10.8 MG/1
3.6 IMPLANT SUBCUTANEOUS ONCE
Refills: 0 | Status: COMPLETED | OUTPATIENT
Start: 2021-08-13 | End: 2021-08-13

## 2021-08-13 RX ORDER — FOSAPREPITANT DIMEGLUMINE 150 MG/5ML
150 INJECTION, POWDER, LYOPHILIZED, FOR SOLUTION INTRAVENOUS ONCE
Refills: 0 | Status: COMPLETED | OUTPATIENT
Start: 2021-08-13 | End: 2021-08-13

## 2021-08-13 RX ORDER — CYCLOPHOSPHAMIDE 100 MG
972 VIAL (EA) INTRAVENOUS ONCE
Refills: 0 | Status: COMPLETED | OUTPATIENT
Start: 2021-08-13 | End: 2021-08-13

## 2021-08-13 RX ORDER — PEGFILGRASTIM-CBQV 6 MG/.6ML
6 INJECTION, SOLUTION SUBCUTANEOUS ONCE
Refills: 0 | Status: COMPLETED | OUTPATIENT
Start: 2021-08-13 | End: 2021-08-13

## 2021-08-13 RX ORDER — DEXAMETHASONE 0.5 MG/5ML
10 ELIXIR ORAL ONCE
Refills: 0 | Status: COMPLETED | OUTPATIENT
Start: 2021-08-13 | End: 2021-08-13

## 2021-08-13 RX ORDER — FULVESTRANT 50 MG/ML
3.6 INJECTION INTRAMUSCULAR ONCE
Refills: 0 | Status: DISCONTINUED | OUTPATIENT
Start: 2021-08-13 | End: 2021-08-13

## 2021-08-13 RX ORDER — DOXORUBICIN HYDROCHLORIDE 2 MG/ML
97 INJECTION, SOLUTION INTRAVENOUS ONCE
Refills: 0 | Status: COMPLETED | OUTPATIENT
Start: 2021-08-13 | End: 2021-08-13

## 2021-08-13 RX ADMIN — Medication 10 MILLIGRAM(S): at 10:16

## 2021-08-13 RX ADMIN — Medication 298.6 MILLIGRAM(S): at 10:57

## 2021-08-13 RX ADMIN — FOSAPREPITANT DIMEGLUMINE 150 MILLIGRAM(S): 150 INJECTION, POWDER, LYOPHILIZED, FOR SOLUTION INTRAVENOUS at 10:36

## 2021-08-13 RX ADMIN — Medication 118 MILLIGRAM(S): at 09:56

## 2021-08-13 RX ADMIN — FOSAPREPITANT DIMEGLUMINE 300 MILLIGRAM(S): 150 INJECTION, POWDER, LYOPHILIZED, FOR SOLUTION INTRAVENOUS at 10:16

## 2021-08-13 RX ADMIN — GOSERELIN ACETATE 3.6 MILLIGRAM(S): 10.8 IMPLANT SUBCUTANEOUS at 09:56

## 2021-08-13 RX ADMIN — DOXORUBICIN HYDROCHLORIDE 582 MILLIGRAM(S): 2 INJECTION, SOLUTION INTRAVENOUS at 10:58

## 2021-08-13 RX ADMIN — PEGFILGRASTIM-CBQV 6 MILLIGRAM(S): 6 INJECTION, SOLUTION SUBCUTANEOUS at 09:56

## 2021-08-13 NOTE — ASSESSMENT
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 \par Patient was seen by [facility].\par # Thyroid nodule: will be assessed with [facility] at a later date\par # Gall bladder polyp : GI eval\par \par RECOMMENDATIONS:\par I had a detailed discussion with the patient and her friend. I reviewed the radiological findings, pathology and staging with her.\par We also discussed the Oncotype Dx results at length. Her Oncotype score is high and predicts significant benefit from chemotherapy.\par I reviewed the natural history of high grade HR+, HER2 neg breast cancer and how it differs from other types of breast cancer. I discussed that LN positive breast cancers tend to have a higher risk of systemic recurrence.\par therefore adjuvant chemotherapy and endocrine therapy is  usually recommended especially in premenopausal women.\par \par She is an appropriate candidate for adjuvant chemotherapy. We discussed the goal of such treatment is to improve risk of recurrence and overall survival.\par Multiple data sets have established the superiority of adjuvant anthracycline based chemotherapy in LN positive breast cancer. Therefore she was recommended AC followed by Taxol, Her EF was normal with LVEF 55-65% on 6/3/2021.\par \par Adriamycin and Cytoxan will be administered every 2 weeks X 4 with Neulasta support followed by Taxol weekly x 12. \par \par - Continue Adriamycin and Cytoxan cycle #3 with Neulasta. CBC is adequate with Hgb 9.5 gm/dL WBC 18.08 K/uL Platelet 241 K/uL ANC 11.17 K/uL\par I discussed side effects which include but are not limited to nausea, vomiting, hair loss, bone marrow suppression, cytopenia, increased risk of infection, sepsis, diarrhea, fatigue, allergic reaction and peripheral neuropathy. Other side effects such as cardiac toxicity,bone marrow injury leading to MDS/AML, were also discussed, however these risks are very small. Overall the risk benefit ratio favors treatment with chemotherapy \par \par Monitoring will be with each cycle Q 2 weeks\par CBC (with differential and platelet count), serum electrolytes, serum creatinine and BUN, CrCl, LFTs; signs/symptoms of hypersensitivity reactions.\par \par - SUPPORTIVE MEASURES discussed\par Zofran 8 mg Q 8 hrs prn for emesis and nausea.\par Compazine  Q 6 hrs prn\par Encourage adequate fluid intake,\par GI prophylaxis with PPI\par \par We also discussed need for endocrine therapy with OFS and AI after chemo and RT are completed.\par - Continue Zoladex  monthly\par Side effects of OFS were discussed including hot flashes, amenorrhea, bone loss, wt gain etc.\par \par - Will do BMP and UA with reflex today.\par - Advised to follow up with Plastic Surgery regarding bubble on left TE\par All of her questions and concerns were addressed.\par \par Due to COVID 19, pre-visit patient instructions were explained to the patient and their symptoms were checked upon arrival. \par Masks were used by the health care providers and staff and the examination room was cleaned before and after the patient visit was completed.\par Pt was encouraged to get Covid vaccine.\par \par RTC in 2 weeks\par

## 2021-08-13 NOTE — PHYSICAL EXAM
[Normal] : affect appropriate [Restricted in physically strenuous activity but ambulatory and able to carry out work of a light or sedentary nature] : Status 1- Restricted in physically strenuous activity but ambulatory and able to carry out work of a light or sedentary nature, e.g., light house work, office work [de-identified] : Bilateral breasts s/p mastectomy and TE placement  No erythema or tenderness; + small bubble below left breast surgical incision [de-identified] : bruise on LLQ of abdomen at the site of injection. Port site is clear

## 2021-08-13 NOTE — HISTORY OF PRESENT ILLNESS
[de-identified] : This is a 45 premenopausal woman being seen for wt loss.\par Pt has Lost 15 lbs in the last 6 mths. \par She has a good appetite.\par C/o having drenching  night sweats more often.\par She is known to have enlarged  neck Lns, saw ENt however could not get FNA as the nodes were too small.\par Had recent change in bowel habits over the last month or so with constipation and diarrhea. She has a follow up with GI.\par Has heavy periods, has fibroids, will see GYN \par She has an enlarged LNs in the groin\par colonoscopy done in 12/2/19 showed 7mm ascending colon polyp.\par EGD non erosive gastritis [de-identified] : ONCOTYPE DX RS 32.\par RR at 7 yrs 25%.\par With chemo benefit from chemo 15% [de-identified] : 5/11/21\par Pt is here for follow up.\par She is here to discuss results and further plan of care.\par She has completed breast biopsy.\par She has surgery appt today.\par She also has a GI appt later in the week\par \par 6/28/21\par Pt is here for follow up.\par She is s/p B/L mastectomy with TE insertion.\par There were no post op complications.\par She still has a drain in place.\par She is here to discuss adjuvant therapy.\par Pt still is menstruating.\par \par 7/29/21\par pt is here for follow up.\par She is s/p 1 cycle of AC. She tolerated it well. No N, V, D, mouth sores, fever.\par Has gastric reflux.\par Had bleeding post Zoladex injection\par \par 8/12/2021\par Patient is here to follow up for breast cancer\par She is s/p cycle #2 Adriamycin/Cytoxan, tolerating well\par She is also on Zoladex\par She feels well today, appetite is good\par She denies nausea, vomiting, or any chest wall concerns\par She c/o of mild spasm after voiding even after completing Cipro x 5days

## 2021-08-13 NOTE — REVIEW OF SYSTEMS
[Swollen Glands] : swollen glands [Night Sweats] : no night sweats [Fatigue] : no fatigue [Negative] : Constitutional [FreeTextEntry8] : mild spasm after voiding

## 2021-08-13 NOTE — CONSULT LETTER
[Dear  ___] : Dear  [unfilled], [Consult Letter:] : I had the pleasure of evaluating your patient, [unfilled]. [Please see my note below.] : Please see my note below. [Consult Closing:] : Thank you very much for allowing me to participate in the care of this patient.  If you have any questions, please do not hesitate to contact me. [Sincerely,] : Sincerely, [DrMele  ___] : Dr. 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

## 2021-08-16 ENCOUNTER — APPOINTMENT (OUTPATIENT)
Dept: PLASTIC SURGERY | Facility: CLINIC | Age: 46
End: 2021-08-16
Payer: COMMERCIAL

## 2021-08-16 PROCEDURE — 99024 POSTOP FOLLOW-UP VISIT: CPT

## 2021-08-16 NOTE — PHYSICAL EXAM
[de-identified] : well-appearing, NAD [de-identified] : Mild scoliosis\par Prominent right costal chest vs left, left upper chest with chemoport in place, c/d/i [de-identified] : Bilateral breasts soft, mastectomy flaps healthy, viable and well perfused with excellent cap refill, incisions healing well, c/d/i, no erythema or tissue ischemia, tissue expanders in good position, no left breast concerning findings, no erythema or fluid collection \par

## 2021-08-16 NOTE — ASSESSMENT
[FreeTextEntry1] : 47 yo F with right breast cancer who is POD#56 s/p BL SSM, Rt axillary LN dissection (Dr. Macias) with immediate prepectoral TE reconstruction (filled to 100cc) with AlloDerm. Doing very well. \par \par Tolerating serial TE fill, total 310 cc b/l (likely overfill volume)\par Doing well. \par \par - patient reassured, no concerning findings on exam\par - may continue chemotx per oncology \par - c/w sports bra\par - sleep supine\par - F/u 2-3 weeks before chemotherapy for possible left minor additional overfill\par Pt was seen and examined with Dr. Gallegos. \par \par Due to COVID-19, pre-visit patient instructions were explained to the patient and their symptoms were checked upon arrival. Masks were used by the healthcare provider and staff and the examination room was cleaned after the patient visit concluded\par \par \par

## 2021-08-16 NOTE — HISTORY OF PRESENT ILLNESS
[FreeTextEntry1] : 46 yo   F with PMHx of anxiety/depression, HTN, GERD, asthma who was recently dx with RIGHT invasive ductal carcinoma and DCIS 2.4 cm @ 10:00 (ER+/IA+/HER2(-)) after palpable mass in right breast.  Denies breast pain, nipple discharge and retraction.  Here with friend, Rukhsana.\par \par Patient is learning toward bilateral mastectomy.  She is inquiring about immediate pre-pec implant based reconstruction.  She would like simplest recovery. 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  She would like a full B in reconstructive volume.\par Recent lost weight 8 lbs. currently 118 lbs.  Mother passed away recently 2 months ago.\par \par Interval hx (21). Patient presents today POD#8 s/p BL SSM, Rt axillary dissection with immediate prepectoral TE reconstruction (filled to 100cc) with AlloDerm. Doing very well with adequate pain management. Taking oral antibiotics as prescribed. Denies any f/c or bleeding. Drains all functional with appropriate output. Rt axillary drain with minimal 5-7 cc daily. \par \par Interval hx (21). Patient presents today POD#13 s/p BL SSM, Rt axillary dissection with immediate prepectoral TE reconstruction (filled to 100cc) with AlloDerm. Doing well with no new concerns. Denies any f/c or bleeding. Drains functional with decreasing output as expected. \par \par Interval hx (21): Pt presents today POD #17 s/p BL SSM, Rt axillary dissection with immediate prepectoral TE reconstruction (filled to 100cc) with AlloDerm. Feeling well after first TE fill. Remaining right drain output: . Denies f/c, redness, swelling, CP/SOB, N/V, or calf pain.\par \par Interval hx (21): Pt presents today POD #22 s/p BL SSM, Rt axillary dissection with immediate prepectoral TE reconstruction (filled to 100cc) with AlloDerm. Saw Dr. Macias  who discussed CTX x12 weeks and recommended RT consult given 3 positive LNs. \par \par Interval hx (21): Pt presents today POD #29 s/p BL SSM, Rt axillary dissection with immediate prepectoral TE reconstruction (filled to 100cc) with AlloDerm. Continues to do well with no significant discomfort. Had chemoport placed on Monday and scheduled to commence CTX later this week. Denies any f/c, bleeding or pressure after TE fills. \par \par Interval hx (21):  Here POD#41 s/p BL immediate prepec TE/Alloderm.  Started on chemotherapy, ; next cycle on .  Pt reports she will need RIGHT BREAST XRT.  Here for serial TE fill.   Denies fevers, chills, redness, swelling.\par \par Interval hx (21):  Here for POD#56 s/p BL prepec TE/Alloderm.  s/p 2 cycles of chemo. s/p chemoport.\par \par Interval hx (21):  Pt presents today as instructed by her oncologist for evaluation of left medial breast "bubble". Denies any pain, f/c, skin changes, open wounds or drainage. Tolerating chemotx with no significant complaints.

## 2021-08-17 LAB
ALBUMIN SERPL ELPH-MCNC: 4.6 G/DL
ALP BLD-CCNC: 111 U/L
ALT SERPL-CCNC: 10 U/L
ANION GAP SERPL CALC-SCNC: 10 MMOL/L
APPEARANCE: CLEAR
AST SERPL-CCNC: 16 U/L
BACTERIA UR CULT: NORMAL
BILIRUB SERPL-MCNC: <0.2 MG/DL
BILIRUBIN URINE: NEGATIVE
BLOOD URINE: NEGATIVE
BUN SERPL-MCNC: 15 MG/DL
CALCIUM SERPL-MCNC: 9.1 MG/DL
CHLORIDE SERPL-SCNC: 102 MMOL/L
CO2 SERPL-SCNC: 26 MMOL/L
COLOR: NORMAL
CREAT SERPL-MCNC: 0.8 MG/DL
GLUCOSE QUALITATIVE U: NEGATIVE
GLUCOSE SERPL-MCNC: 107 MG/DL
HCT VFR BLD CALC: 30.3 %
HGB BLD-MCNC: 9.5 G/DL
KETONES URINE: NEGATIVE
LEUKOCYTE ESTERASE URINE: NEGATIVE
MCHC RBC-ENTMCNC: 28.4 PG
MCHC RBC-ENTMCNC: 31.4 G/DL
MCV RBC AUTO: 90.4 FL
NITRITE URINE: NEGATIVE
PH URINE: 6
PLATELET # BLD AUTO: 241 K/UL
PMV BLD: 11.3 FL
POTASSIUM SERPL-SCNC: 4.1 MMOL/L
PROT SERPL-MCNC: 6.6 G/DL
PROTEIN URINE: NEGATIVE
RBC # BLD: 3.35 M/UL
RBC # FLD: 18 %
SODIUM SERPL-SCNC: 138 MMOL/L
SPECIFIC GRAVITY URINE: 1.02
UROBILINOGEN URINE: NORMAL
WBC # FLD AUTO: 18.08 K/UL

## 2021-08-25 ENCOUNTER — APPOINTMENT (OUTPATIENT)
Dept: HEMATOLOGY ONCOLOGY | Facility: CLINIC | Age: 46
End: 2021-08-25
Payer: COMMERCIAL

## 2021-08-25 ENCOUNTER — LABORATORY RESULT (OUTPATIENT)
Age: 46
End: 2021-08-25

## 2021-08-25 ENCOUNTER — APPOINTMENT (OUTPATIENT)
Dept: INTERVENTIONAL RADIOLOGY/VASCULAR | Facility: CLINIC | Age: 46
End: 2021-08-25
Payer: COMMERCIAL

## 2021-08-25 ENCOUNTER — OUTPATIENT (OUTPATIENT)
Dept: OUTPATIENT SERVICES | Facility: HOSPITAL | Age: 46
LOS: 1 days | Discharge: HOME | End: 2021-08-25
Payer: COMMERCIAL

## 2021-08-25 VITALS
HEIGHT: 65 IN | DIASTOLIC BLOOD PRESSURE: 79 MMHG | HEART RATE: 76 BPM | WEIGHT: 128 LBS | TEMPERATURE: 98.6 F | BODY MASS INDEX: 21.33 KG/M2 | SYSTOLIC BLOOD PRESSURE: 143 MMHG

## 2021-08-25 DIAGNOSIS — Z90.49 ACQUIRED ABSENCE OF OTHER SPECIFIED PARTS OF DIGESTIVE TRACT: Chronic | ICD-10-CM

## 2021-08-25 DIAGNOSIS — Z98.890 OTHER SPECIFIED POSTPROCEDURAL STATES: Chronic | ICD-10-CM

## 2021-08-25 DIAGNOSIS — R22.9 LOCALIZED SWELLING, MASS AND LUMP, UNSPECIFIED: ICD-10-CM

## 2021-08-25 DIAGNOSIS — Z90.13 ACQUIRED ABSENCE OF BILATERAL BREASTS AND NIPPLES: Chronic | ICD-10-CM

## 2021-08-25 DIAGNOSIS — M67.40 GANGLION, UNSPECIFIED SITE: Chronic | ICD-10-CM

## 2021-08-25 LAB
HCT VFR BLD CALC: 30.3 %
HGB BLD-MCNC: 9.5 G/DL
MCHC RBC-ENTMCNC: 28.7 PG
MCHC RBC-ENTMCNC: 31.4 G/DL
MCV RBC AUTO: 91.5 FL
PLATELET # BLD AUTO: 221 K/UL
PMV BLD: 9.4 FL
RBC # BLD: 3.31 M/UL
RBC # FLD: 19.9 %
WBC # FLD AUTO: 19.73 K/UL

## 2021-08-25 PROCEDURE — 99024 POSTOP FOLLOW-UP VISIT: CPT

## 2021-08-25 PROCEDURE — 76536 US EXAM OF HEAD AND NECK: CPT | Mod: 26

## 2021-08-25 PROCEDURE — 99215 OFFICE O/P EST HI 40 MIN: CPT

## 2021-08-25 NOTE — REVIEW OF SYSTEMS
[Fever] : no fever [Chills] : no chills [Feeling Poorly] : not feeling poorly [Heart Rate Is Slow] : the heart rate was not slow [Heart Rate Is Fast] : the heart rate was not fast [Chest Pain] : no chest pain [Palpitations] : no palpitations [Shortness Of Breath] : no shortness of breath [Wheezing] : no wheezing [Cough] : no cough [SOB on Exertion] : no shortness of breath during exertion [Abdominal Pain] : no abdominal pain [Vomiting] : no vomiting [Constipation] : no constipation [Diarrhea] : no diarrhea [Skin Lesions] : no skin lesions [Skin Wound] : no skin wound [Confused] : no confusion [Dizziness] : no dizziness

## 2021-08-25 NOTE — HISTORY OF PRESENT ILLNESS
[FreeTextEntry1] : 44 yo  F with PMHx of anxiety/depression, HTN, GERD, asthma who was recently dx with RIGHT invasive ductal carcinoma and DCIS s/p left port a cath placement on  presents today for port evaluation due to mild tenderness and redness at incision site for the past three days. \par

## 2021-08-25 NOTE — PHYSICAL EXAM
[Alert] : alert [No Acute Distress] : no acute distress [Well Nourished] : well nourished [Well Developed] : well developed [Normal Sclera/Conjunctiva] : normal sclera/conjunctiva [PERRL] : pupils equal, round and reactive to light [No Respiratory Distress] : no respiratory distress [Normal Rate and Effort] : normal respiratory rhythm and effort [No Accessory Muscle Use] : no accessory muscle use [Clear to Auscultation] : lungs were clear to auscultation bilaterally [Normal PMI] : the apical impulse was normal [Normal Rate] : heart rate was normal  [Not Tender] : non-tender [Soft] : abdomen soft [Not Distended] : not distended [No Rash] : no rash [No Skin Lesions] : no skin lesions [Oriented x3] : oriented to person, place, and time [Normal Insight/Judgement] : insight and judgment were intact [Normal Affect] : the affect was normal [Normal Mood] : the mood was normal [Recent Memory Normal] : recent memory was not impaired [Remote Memory Normal] : remote memory was not impaired [Restricted in physically strenuous activity but ambulatory and able to carry out work of a light or sedentary nature] : Restricted in physically strenuous activity but ambulatory and able to carry out work of a light or sedentary nature, e.g., light house work, office work

## 2021-08-25 NOTE — ASSESSMENT
[FreeTextEntry1] : 46 yo  F with PMHx of anxiety/depression, HTN, GERD, asthma who was recently dx with RIGHT invasive ductal carcinoma and DCIS s/p left port a cath placement on  presents today for port evaluation due to mild tenderness and redness at incision site for the past three days. \par \par Patient states she has noticed the incision line sutures are more prominent than before and the incision site has been itchy and red. Patient denies fevers/chills, or N/V/D, swelling or discharge at the site. \par \par On examination, port a cath site looks clean, dry and intact. Mild erythema and tenderness noted along incision site, sutures noted. No swelling or drainage noted on manipulation. Right suture cut down, additional sutures along incision line too short to cut, manually reduced further into skin. Due to erythema and tenderness recommended Keflex for 5 days and will re-evaluate after the weekend. If sutures remain problematic will remove them and Dermabond the site. Incision site is primarily closed and healed at this time, no immediate need for Dermabond at this time.\par \par Port access site C/D/I. No erythema, swelling or tenderness noted. No neck pain, no limited ROM.\par \par Plan to call patient on Friday to see how she feels, will re-evaluate if no change by Monday.\par \par Educated patient on signs and symptoms of infection, told to contact us for any further questions or concerns. \par \par Port a cath ok for continued use.\par \par No further IR follow up or management needed.\par

## 2021-08-26 ENCOUNTER — APPOINTMENT (OUTPATIENT)
Dept: PLASTIC SURGERY | Facility: CLINIC | Age: 46
End: 2021-08-26
Payer: COMMERCIAL

## 2021-08-26 ENCOUNTER — OUTPATIENT (OUTPATIENT)
Dept: OUTPATIENT SERVICES | Facility: HOSPITAL | Age: 46
LOS: 1 days | Discharge: HOME | End: 2021-08-26

## 2021-08-26 DIAGNOSIS — Z90.49 ACQUIRED ABSENCE OF OTHER SPECIFIED PARTS OF DIGESTIVE TRACT: Chronic | ICD-10-CM

## 2021-08-26 DIAGNOSIS — Z90.13 ACQUIRED ABSENCE OF BILATERAL BREASTS AND NIPPLES: Chronic | ICD-10-CM

## 2021-08-26 DIAGNOSIS — Z91.89 OTHER SPECIFIED PERSONAL RISK FACTORS, NOT ELSEWHERE CLASSIFIED: ICD-10-CM

## 2021-08-26 DIAGNOSIS — M67.40 GANGLION, UNSPECIFIED SITE: Chronic | ICD-10-CM

## 2021-08-26 DIAGNOSIS — Z98.890 OTHER SPECIFIED POSTPROCEDURAL STATES: Chronic | ICD-10-CM

## 2021-08-26 LAB
ALBUMIN SERPL ELPH-MCNC: 4.6 G/DL
ALP BLD-CCNC: 137 U/L
ALT SERPL-CCNC: 9 U/L
ANION GAP SERPL CALC-SCNC: 11 MMOL/L
AST SERPL-CCNC: 15 U/L
BILIRUB SERPL-MCNC: <0.2 MG/DL
BUN SERPL-MCNC: 13 MG/DL
CALCIUM SERPL-MCNC: 9.2 MG/DL
CHLORIDE SERPL-SCNC: 103 MMOL/L
CO2 SERPL-SCNC: 25 MMOL/L
CREAT SERPL-MCNC: 0.7 MG/DL
GLUCOSE SERPL-MCNC: 99 MG/DL
POTASSIUM SERPL-SCNC: 4.1 MMOL/L
PROT SERPL-MCNC: 6.8 G/DL
SODIUM SERPL-SCNC: 139 MMOL/L

## 2021-08-26 PROCEDURE — 99024 POSTOP FOLLOW-UP VISIT: CPT

## 2021-08-26 RX ORDER — METOPROLOL SUCCINATE 50 MG/1
50 TABLET, EXTENDED RELEASE ORAL DAILY
Qty: 90 | Refills: 1 | Status: DISCONTINUED | COMMUNITY
Start: 2021-07-13 | End: 2021-08-26

## 2021-08-26 RX ORDER — OMEPRAZOLE 20 MG/1
20 CAPSULE, DELAYED RELEASE ORAL
Qty: 30 | Refills: 5 | Status: DISCONTINUED | COMMUNITY
Start: 2021-07-29 | End: 2021-08-26

## 2021-08-26 RX ORDER — ASCORBIC ACID 500 MG
TABLET ORAL
Refills: 0 | Status: DISCONTINUED | COMMUNITY
End: 2021-08-26

## 2021-08-26 RX ORDER — CIPROFLOXACIN HYDROCHLORIDE 500 MG/1
500 TABLET, FILM COATED ORAL DAILY
Qty: 5 | Refills: 0 | Status: DISCONTINUED | COMMUNITY
Start: 2021-08-02 | End: 2021-08-26

## 2021-08-26 RX ORDER — ALPRAZOLAM 0.25 MG/1
0.25 TABLET ORAL
Refills: 0 | Status: DISCONTINUED | COMMUNITY
End: 2021-08-26

## 2021-08-26 RX ORDER — COLD-HOT PACK
125 MCG EACH MISCELLANEOUS
Refills: 0 | Status: DISCONTINUED | COMMUNITY
End: 2021-08-26

## 2021-08-26 RX ORDER — TRIAMCINOLONE ACETONIDE 55 UL/1
SPRAY, METERED NASAL
Refills: 0 | Status: DISCONTINUED | COMMUNITY
End: 2021-08-26

## 2021-08-26 RX ORDER — ONDANSETRON 8 MG/1
8 TABLET ORAL EVERY 8 HOURS
Qty: 30 | Refills: 3 | Status: DISCONTINUED | COMMUNITY
Start: 2021-07-07 | End: 2021-08-26

## 2021-08-26 RX ORDER — PROCHLORPERAZINE MALEATE 10 MG/1
10 TABLET ORAL EVERY 6 HOURS
Qty: 30 | Refills: 3 | Status: DISCONTINUED | COMMUNITY
Start: 2021-07-07 | End: 2021-08-26

## 2021-08-26 RX ORDER — FLUCONAZOLE 150 MG/1
150 TABLET ORAL
Qty: 2 | Refills: 0 | Status: DISCONTINUED | COMMUNITY
Start: 2021-07-15 | End: 2021-08-26

## 2021-08-26 RX ORDER — SERTRALINE HYDROCHLORIDE 50 MG/1
50 TABLET, FILM COATED ORAL DAILY
Refills: 0 | Status: DISCONTINUED | COMMUNITY
End: 2021-08-26

## 2021-08-26 RX ORDER — CEPHALEXIN 500 MG/1
500 TABLET ORAL
Qty: 14 | Refills: 0 | Status: DISCONTINUED | COMMUNITY
Start: 2021-08-25 | End: 2021-08-26

## 2021-08-26 NOTE — HISTORY OF PRESENT ILLNESS
[FreeTextEntry1] : 44 yo   F with PMHx of anxiety/depression, HTN, GERD, asthma who was recently dx with RIGHT invasive ductal carcinoma and DCIS 2.4 cm @ 10:00 (ER+/CT+/HER2(-)) after palpable mass in right breast.  Denies breast pain, nipple discharge and retraction.  Here with friend, Rukhsana.\par \par Patient is learning toward bilateral mastectomy.  She is inquiring about immediate pre-pec implant based reconstruction.  She would like simplest recovery. 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  She would like a full B in reconstructive volume.\par Recent lost weight 8 lbs. currently 118 lbs.  Mother passed away recently 2 months ago.\par \par Interval hx (21). Patient presents today POD#8 s/p BL SSM, Rt axillary dissection with immediate prepectoral TE reconstruction (filled to 100cc) with AlloDerm. Doing very well with adequate pain management. Taking oral antibiotics as prescribed. Denies any f/c or bleeding. Drains all functional with appropriate output. Rt axillary drain with minimal 5-7 cc daily. \par \par Interval hx (21). Patient presents today POD#13 s/p BL SSM, Rt axillary dissection with immediate prepectoral TE reconstruction (filled to 100cc) with AlloDerm. Doing well with no new concerns. Denies any f/c or bleeding. Drains functional with decreasing output as expected. \par \par Interval hx (21): Pt presents today POD #17 s/p BL SSM, Rt axillary dissection with immediate prepectoral TE reconstruction (filled to 100cc) with AlloDerm. Feeling well after first TE fill. Remaining right drain output: . Denies f/c, redness, swelling, CP/SOB, N/V, or calf pain.\par \par Interval hx (21): Pt presents today POD #22 s/p BL SSM, Rt axillary dissection with immediate prepectoral TE reconstruction (filled to 100cc) with AlloDerm. Saw Dr. Macias  who discussed CTX x12 weeks and recommended RT consult given 3 positive LNs. \par \par Interval hx (21): Pt presents today POD #29 s/p BL SSM, Rt axillary dissection with immediate prepectoral TE reconstruction (filled to 100cc) with AlloDerm. Continues to do well with no significant discomfort. Had chemoport placed on Monday and scheduled to commence CTX later this week. Denies any f/c, bleeding or pressure after TE fills. \par \par Interval hx (21):  Here POD#41 s/p BL immediate prepec TE/Alloderm.  Started on chemotherapy, ; next cycle on .  Pt reports she will need RIGHT BREAST XRT.  Here for serial TE fill.   Denies fevers, chills, redness, swelling.\par \par Interval hx (21):  Here for POD#56 s/p BL prepec TE/Alloderm.  s/p 2 cycles of chemo. s/p chemoport.\par \par Interval hx (21):  Pt presents today as instructed by her oncologist for evaluation of left medial breast "bubble". Denies any pain, f/c, skin changes, open wounds or drainage. Tolerating chemotx with no significant complaints. \par \par Interval hx (21). Patient presents today for left TE overfill. Doing well with no new complaints. Tolerating chemotx with alopecia as expected, otherwise no significant adverse effects.

## 2021-08-26 NOTE — ASSESSMENT
[FreeTextEntry1] : 45 yo F with right breast cancer who is POD#72 s/p BL SSM, Rt axillary LN dissection (Dr. Macias) with immediate prepectoral TE reconstruction (filled to 100cc) with AlloDerm. Doing very well. \par \par Tolerating serial TE fill, total 310 cc b/l (likely overfill volume)\par Doing well. \par \par - left TE overfilled today, now 340 cc\par - continue chemotx per oncology \par - c/w sports bra\par - sleep supine\par - F/u 6 weeks with Dr. Gallegos \par \par Due to COVID-19, pre-visit patient instructions were explained to the patient and their symptoms were checked upon arrival. Masks were used by the healthcare provider and staff and the examination room was cleaned after the patient visit concluded\par \par \par

## 2021-08-26 NOTE — PROCEDURE
[FreeTextEntry1] : s/p BL TE reconstruction  [FreeTextEntry2] : Tissue expander fill  [FreeTextEntry6] : The port location was identified and marked on the skin with the aid of the implant magnet. The skin was prepped in sterile fashion. Sterile saline was injected. The skin flaps remained well perfused, pink with good capillary refill <2 sec. The patient tolerated the procedure. \par \par Left mastectomy 250 g; Right mastectomy 435 g\par \par Right TE pre-expansion volume: 260 cc\par Total volume right TE after expansion : 310 cc\par \par Left TE pre-expansion volume: 310 cc\par Total volume left TE after expansion : 340 cc

## 2021-08-26 NOTE — PHYSICAL EXAM
[de-identified] : well-appearing, NAD [de-identified] : Mild scoliosis\par Prominent right costal chest vs left, left upper chest with chemoport in place, c/d/i [de-identified] : Bilateral breasts soft, mastectomy flaps healthy, viable and well perfused with excellent cap refill, incisions healing well, c/d/i, no erythema or tissue ischemia, tissue expanders in good position, no fluid collection \par

## 2021-08-27 ENCOUNTER — APPOINTMENT (OUTPATIENT)
Dept: INFUSION THERAPY | Facility: CLINIC | Age: 46
End: 2021-08-27

## 2021-08-27 RX ORDER — FOSAPREPITANT DIMEGLUMINE 150 MG/5ML
150 INJECTION, POWDER, LYOPHILIZED, FOR SOLUTION INTRAVENOUS ONCE
Refills: 0 | Status: COMPLETED | OUTPATIENT
Start: 2021-08-27 | End: 2021-08-27

## 2021-08-27 RX ORDER — CYCLOPHOSPHAMIDE 100 MG
972 VIAL (EA) INTRAVENOUS ONCE
Refills: 0 | Status: COMPLETED | OUTPATIENT
Start: 2021-08-27 | End: 2021-08-27

## 2021-08-27 RX ORDER — DOXORUBICIN HYDROCHLORIDE 2 MG/ML
97 INJECTION, SOLUTION INTRAVENOUS ONCE
Refills: 0 | Status: COMPLETED | OUTPATIENT
Start: 2021-08-27 | End: 2021-08-27

## 2021-08-27 RX ORDER — DEXAMETHASONE 0.5 MG/5ML
10 ELIXIR ORAL ONCE
Refills: 0 | Status: COMPLETED | OUTPATIENT
Start: 2021-08-27 | End: 2021-08-27

## 2021-08-27 RX ORDER — PEGFILGRASTIM-CBQV 6 MG/.6ML
6 INJECTION, SOLUTION SUBCUTANEOUS ONCE
Refills: 0 | Status: COMPLETED | OUTPATIENT
Start: 2021-08-27 | End: 2021-08-27

## 2021-08-27 RX ADMIN — DOXORUBICIN HYDROCHLORIDE 582 MILLIGRAM(S): 2 INJECTION, SOLUTION INTRAVENOUS at 10:25

## 2021-08-27 RX ADMIN — Medication 298.6 MILLIGRAM(S): at 10:45

## 2021-08-27 RX ADMIN — PEGFILGRASTIM-CBQV 6 MILLIGRAM(S): 6 INJECTION, SOLUTION SUBCUTANEOUS at 10:26

## 2021-08-27 RX ADMIN — FOSAPREPITANT DIMEGLUMINE 150 MILLIGRAM(S): 150 INJECTION, POWDER, LYOPHILIZED, FOR SOLUTION INTRAVENOUS at 10:04

## 2021-08-27 RX ADMIN — FOSAPREPITANT DIMEGLUMINE 300 MILLIGRAM(S): 150 INJECTION, POWDER, LYOPHILIZED, FOR SOLUTION INTRAVENOUS at 09:44

## 2021-08-27 RX ADMIN — Medication 118 MILLIGRAM(S): at 09:24

## 2021-08-27 RX ADMIN — Medication 10 MILLIGRAM(S): at 09:44

## 2021-08-27 RX ADMIN — Medication 972 MILLIGRAM(S): at 11:45

## 2021-08-27 NOTE — PHYSICAL EXAM
[Restricted in physically strenuous activity but ambulatory and able to carry out work of a light or sedentary nature] : Status 1- Restricted in physically strenuous activity but ambulatory and able to carry out work of a light or sedentary nature, e.g., light house work, office work [Normal] : affect appropriate [de-identified] : Bilateral breasts s/p mastectomy and TE placement  No erythema or tenderness; + small bubble below left breast surgical incision [de-identified] : bruise on LLQ of abdomen at the site of injection. Port site is clear

## 2021-08-27 NOTE — HISTORY OF PRESENT ILLNESS
[de-identified] : This is a 45 premenopausal woman being seen for wt loss.\par Pt has Lost 15 lbs in the last 6 mths. \par She has a good appetite.\par C/o having drenching  night sweats more often.\par She is known to have enlarged  neck Lns, saw ENt however could not get FNA as the nodes were too small.\par Had recent change in bowel habits over the last month or so with constipation and diarrhea. She has a follow up with GI.\par Has heavy periods, has fibroids, will see GYN \par She has an enlarged LNs in the groin\par colonoscopy done in 12/2/19 showed 7mm ascending colon polyp.\par EGD non erosive gastritis [de-identified] : ONCOTYPE DX RS 32.\par RR at 7 yrs 25%.\par With chemo benefit from chemo 15% [de-identified] : 5/11/21\par Pt is here for follow up.\par She is here to discuss results and further plan of care.\par She has completed breast biopsy.\par She has surgery appt today.\par She also has a GI appt later in the week\par \par 6/28/21\par Pt is here for follow up.\par She is s/p B/L mastectomy with TE insertion.\par There were no post op complications.\par She still has a drain in place.\par She is here to discuss adjuvant therapy.\par Pt still is menstruating.\par \par 7/29/21\par pt is here for follow up.\par She is s/p 1 cycle of AC. She tolerated it well. No N, V, D, mouth sores, fever.\par Has gastric reflux.\par Had bleeding post Zoladex injection\par \par 8/12/2021\par Patient is here to follow up for breast cancer\par She is s/p cycle #2 Adriamycin/Cytoxan, tolerating well\par She is also on Zoladex\par She feels well today, appetite is good\par She denies nausea, vomiting, or any chest wall concerns\par She c/o of mild spasm after voiding even after completing Cipro x 5days\par \par 8/25/21\par Pt is here for follow up.\par Tolerating chemo with some SE. Nausea controlled with Zofran. Had UTI few weeks ago, treated with ABX, resolved.\par No menstrual bleeding since starting Zoladex.\par Will be seeing Dr Suggs.

## 2021-08-27 NOTE — CONSULT LETTER
[Dear  ___] : Dear  [unfilled], [Consult Letter:] : I had the pleasure of evaluating your patient, [unfilled]. [Please see my note below.] : Please see my note below. [Consult Closing:] : Thank you very much for allowing me to participate in the care of this patient.  If you have any questions, please do not hesitate to contact me. [Sincerely,] : Sincerely, [DrMele  ___] : Dr. KIM [FreeTextEntry3] : Wolfgang Little MD\par Professor [employer]\par [employer]\par Attending Physician\par [facility] [place]\par [phone]\par \par

## 2021-08-27 NOTE — ASSESSMENT
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 \par Patient was seen by [facility].\par # Thyroid nodule: will be assessed with USG at a later date\par # Gall bladder polyp : GI eval\par \par RECOMMENDATIONS:\par I had a detailed discussion with the patient and her friend. I reviewed the radiological findings, pathology and staging with her.\par We also discussed the Oncotype Dx results at length. Her Oncotype score is high and predicts significant benefit from chemotherapy.\par I reviewed the natural history of high grade HR+, HER2 neg breast cancer and how it differs from other types of breast cancer. I discussed that LN positive breast cancers tend to have a higher risk of systemic recurrence.\par therefore adjuvant chemotherapy and endocrine therapy is  usually recommended especially in premenopausal women.\par \par She is an appropriate candidate for adjuvant chemotherapy. We discussed the goal of such treatment is to improve risk of recurrence and overall survival.\par Multiple data sets have established the superiority of adjuvant anthracycline based chemotherapy in LN positive breast cancer. Therefore she was recommended AC followed by Taxol, Her EF was normal with LVEF 55-65% on 6/3/2021.\par \par Adriamycin and Cytoxan will be administered every 2 weeks X 4 with Neulasta support followed by Taxol weekly x 12. \par \par - Continue Adriamycin and Cytoxan cycle # 4 with Neulasta. CBC is adequate \par \par I discussed side effects which include but are not limited to nausea, vomiting, hair loss, bone marrow suppression, cytopenia, increased risk of infection, sepsis, diarrhea, fatigue, allergic reaction and peripheral neuropathy. Other side effects such as cardiac toxicity,bone marrow injury leading to MDS/AML, were also discussed, however these risks are very small. Overall the risk benefit ratio favors treatment with chemotherapy \par \par Monitoring will be with each cycle Q 2 weeks\par CBC (with differential and platelet count), serum electrolytes, serum creatinine and BUN, CrCl, LFTs; signs/symptoms of hypersensitivity reactions.\par \par - SUPPORTIVE MEASURES discussed\par Zofran 8 mg Q 8 hrs prn for emesis and nausea.\par Compazine  Q 6 hrs prn\par Encourage adequate fluid intake,\par GI prophylaxis with PPI\par \par We also discussed need for endocrine therapy with OFS and AI after chemo and RT are completed.\par - Continue Zoladex  monthly\par Side effects of OFS were discussed including hot flashes, amenorrhea, bone loss, wt gain etc.\par \par - Will do BMP and UA with reflex today.\par - Advised to follow up with Plastic Surgery regarding bubble on left TE\par All of her questions and concerns were addressed.\par \par Pt prefers to get TAxol Q2 weeks X 4 rather than weekly d/t scheduling issues.\par \par Due to COVID 19, pre-visit patient instructions were explained to the patient and their symptoms were checked upon arrival. \par Masks were used by the health care providers and staff and the examination room was cleaned before and after the patient visit was completed.\par Pt was encouraged to get Covid vaccine.\par \par RTC in 2 weeks\par

## 2021-08-27 NOTE — REVIEW OF SYSTEMS
[Swollen Glands] : swollen glands [Negative] : Allergic/Immunologic [Night Sweats] : no night sweats [Fatigue] : no fatigue [FreeTextEntry8] : mild spasm after voiding

## 2021-08-30 ENCOUNTER — APPOINTMENT (OUTPATIENT)
Dept: CARDIOLOGY | Facility: CLINIC | Age: 46
End: 2021-08-30
Payer: COMMERCIAL

## 2021-08-30 LAB
FERRITIN SERPL-MCNC: 108 NG/ML
FOLATE SERPL-MCNC: 6.1 NG/ML
IRON SATN MFR SERPL: 27 %
IRON SERPL-MCNC: 93 UG/DL
TIBC SERPL-MCNC: 343 UG/DL
UIBC SERPL-MCNC: 250 UG/DL
VIT B12 SERPL-MCNC: >2000 PG/ML

## 2021-08-30 PROCEDURE — 93306 TTE W/DOPPLER COMPLETE: CPT

## 2021-08-31 ENCOUNTER — APPOINTMENT (OUTPATIENT)
Dept: GASTROENTEROLOGY | Facility: CLINIC | Age: 46
End: 2021-08-31
Payer: COMMERCIAL

## 2021-08-31 DIAGNOSIS — K29.70 GASTRITIS, UNSPECIFIED, W/OUT BLEEDING: ICD-10-CM

## 2021-08-31 DIAGNOSIS — K31.89 GASTRITIS, UNSPECIFIED, W/OUT BLEEDING: ICD-10-CM

## 2021-08-31 PROCEDURE — 99214 OFFICE O/P EST MOD 30 MIN: CPT | Mod: 95

## 2021-08-31 NOTE — PHYSICAL EXAM
[General Appearance - Alert] : alert [Hearing Threshold Finger Rub Not [place]] : hearing was normal [] : no respiratory distress [Skin Color & Pigmentation] : normal skin color and pigmentation [Oriented To Time, Place, And Person] : oriented to person, place, and time

## 2021-08-31 NOTE — ASSESSMENT
[FreeTextEntry1] : 44 year old female pt with increased risk for CRC, GERD controlled on PPI, Last EGD and  colonoscopy in 2019 with a TA, recent diagnosis of stage 2 breast cancer s/p B/L mastectomy now undergoing chemotherapy. \par While undergoing staging a CT scan of A showed a 5mm GB polyp.\par Here for eval. \par Pt reports no weight loss, change in bowel habits. No blood in stools, dysphagia or abdominal pain. \par \par \par 1- Gallbladder polyp, 5 mm, incidental\par needs US of GB now, 6 and 12 months later then yearly if stable size\par \par 2- Colon polyp\par surveillance colonoscopy due in 2024\par \par 3- GERD\par IM of antrum\par continue PPI\par EGD in 5 years with colono to surveil IM of antrum

## 2021-08-31 NOTE — HISTORY OF PRESENT ILLNESS
[Home] : at home, [unfilled] , at the time of the visit. [Medical Office: ([facility] [place])___] : at the medical office located in  [Verbal consent obtained from patient] : the patient, [unfilled] [FreeTextEntry4] : Marti Mejia [de-identified] : 44 year old female pt with increased risk for CRC, GERD controlled on PPI, Last EGD and  colonoscopy in 2019 with a TA, recent diagnosis of stage 2 breast cancer s/p B/L mastectomy now undergoing chemotherapy. \par While undergoing staging a CT scan of A showed a 5mm GB polyp.\par Here for eval. \par Pt reports no weight loss, change in bowel habits. No blood in stools, dysphagia or abdominal pain.

## 2021-09-09 ENCOUNTER — LABORATORY RESULT (OUTPATIENT)
Age: 46
End: 2021-09-09

## 2021-09-09 ENCOUNTER — APPOINTMENT (OUTPATIENT)
Dept: HEMATOLOGY ONCOLOGY | Facility: CLINIC | Age: 46
End: 2021-09-09
Payer: COMMERCIAL

## 2021-09-09 VITALS
BODY MASS INDEX: 21.16 KG/M2 | SYSTOLIC BLOOD PRESSURE: 162 MMHG | HEIGHT: 65 IN | TEMPERATURE: 97.1 F | WEIGHT: 127 LBS | DIASTOLIC BLOOD PRESSURE: 81 MMHG | HEART RATE: 76 BPM | RESPIRATION RATE: 14 BRPM

## 2021-09-09 LAB
HCT VFR BLD CALC: 31.9 %
HGB BLD-MCNC: 9.8 G/DL
MCHC RBC-ENTMCNC: 28.7 PG
MCHC RBC-ENTMCNC: 30.7 G/DL
MCV RBC AUTO: 93.5 FL
PLATELET # BLD AUTO: 265 K/UL
PMV BLD: 9.1 FL
RBC # BLD: 3.41 M/UL
RBC # FLD: 23.8 %
WBC # FLD AUTO: 17.77 K/UL

## 2021-09-09 PROCEDURE — 99215 OFFICE O/P EST HI 40 MIN: CPT

## 2021-09-10 ENCOUNTER — NON-APPOINTMENT (OUTPATIENT)
Age: 46
End: 2021-09-10

## 2021-09-10 ENCOUNTER — APPOINTMENT (OUTPATIENT)
Dept: INFUSION THERAPY | Facility: CLINIC | Age: 46
End: 2021-09-10

## 2021-09-10 LAB
ALBUMIN SERPL ELPH-MCNC: 4.8 G/DL
ALP BLD-CCNC: 148 U/L
ALT SERPL-CCNC: 9 U/L
ANION GAP SERPL CALC-SCNC: 12 MMOL/L
AST SERPL-CCNC: 18 U/L
BILIRUB SERPL-MCNC: <0.2 MG/DL
BUN SERPL-MCNC: 18 MG/DL
CALCIUM SERPL-MCNC: 9.4 MG/DL
CANCER AG125 SERPL-ACNC: 11 U/ML
CHLORIDE SERPL-SCNC: 103 MMOL/L
CO2 SERPL-SCNC: 26 MMOL/L
CREAT SERPL-MCNC: 0.8 MG/DL
GLUCOSE SERPL-MCNC: 83 MG/DL
POTASSIUM SERPL-SCNC: 4.5 MMOL/L
PROT SERPL-MCNC: 6.9 G/DL
SODIUM SERPL-SCNC: 141 MMOL/L

## 2021-09-10 RX ORDER — DIPHENHYDRAMINE HCL 50 MG
50 CAPSULE ORAL ONCE
Refills: 0 | Status: COMPLETED | OUTPATIENT
Start: 2021-09-10 | End: 2021-09-10

## 2021-09-10 RX ORDER — PACLITAXEL 6 MG/ML
285 INJECTION, SOLUTION, CONCENTRATE INTRAVENOUS ONCE
Refills: 0 | Status: COMPLETED | OUTPATIENT
Start: 2021-09-10 | End: 2021-09-10

## 2021-09-10 RX ORDER — FAMOTIDINE 10 MG/ML
20 INJECTION INTRAVENOUS ONCE
Refills: 0 | Status: COMPLETED | OUTPATIENT
Start: 2021-09-10 | End: 2021-09-10

## 2021-09-10 RX ORDER — FAMOTIDINE 10 MG/ML
20 INJECTION INTRAVENOUS ONCE
Refills: 0 | Status: DISCONTINUED | OUTPATIENT
Start: 2021-09-10 | End: 2021-09-10

## 2021-09-10 RX ORDER — DIPHENHYDRAMINE HCL 50 MG
50 CAPSULE ORAL ONCE
Refills: 0 | Status: DISCONTINUED | OUTPATIENT
Start: 2021-09-10 | End: 2021-09-10

## 2021-09-10 RX ORDER — DEXAMETHASONE 0.5 MG/5ML
20 ELIXIR ORAL ONCE
Refills: 0 | Status: COMPLETED | OUTPATIENT
Start: 2021-09-10 | End: 2021-09-10

## 2021-09-10 RX ORDER — GOSERELIN ACETATE 10.8 MG/1
3.6 IMPLANT SUBCUTANEOUS ONCE
Refills: 0 | Status: COMPLETED | OUTPATIENT
Start: 2021-09-10 | End: 2021-09-10

## 2021-09-10 RX ADMIN — Medication 126 MILLIGRAM(S): at 11:09

## 2021-09-10 RX ADMIN — PACLITAXEL 182.5 MILLIGRAM(S): 6 INJECTION, SOLUTION, CONCENTRATE INTRAVENOUS at 11:51

## 2021-09-10 RX ADMIN — Medication 102 MILLIGRAM(S): at 11:50

## 2021-09-10 RX ADMIN — Medication 20 MILLIGRAM(S): at 11:30

## 2021-09-10 RX ADMIN — FAMOTIDINE 104 MILLIGRAM(S): 10 INJECTION INTRAVENOUS at 11:30

## 2021-09-10 RX ADMIN — GOSERELIN ACETATE 3.6 MILLIGRAM(S): 10.8 IMPLANT SUBCUTANEOUS at 11:52

## 2021-09-10 RX ADMIN — FAMOTIDINE 20 MILLIGRAM(S): 10 INJECTION INTRAVENOUS at 11:50

## 2021-09-10 RX ADMIN — Medication 50 MILLIGRAM(S): at 12:10

## 2021-09-10 NOTE — CONSULT LETTER
[Dear  ___] : Dear  [unfilled], [Consult Letter:] : I had the pleasure of evaluating your patient, [unfilled]. [Please see my note below.] : Please see my note below. [Consult Closing:] : Thank you very much for allowing me to participate in the care of this patient.  If you have any questions, please do not hesitate to contact me. [Sincerely,] : Sincerely, [DrMele  ___] : Dr. KIM [FreeTextEntry3] : Wolfgang Little MD\par Professor [employer]\par [employer]\par Attending Physician\par [facility] [place]\par [phone]\par \par

## 2021-09-10 NOTE — PHYSICAL EXAM
[Restricted in physically strenuous activity but ambulatory and able to carry out work of a light or sedentary nature] : Status 1- Restricted in physically strenuous activity but ambulatory and able to carry out work of a light or sedentary nature, e.g., light house work, office work [Normal] : affect appropriate [de-identified] : Bilateral breasts s/p mastectomy and TE placement  No erythema or tenderness;  [de-identified] : bruise on LLQ of abdomen at the site of injection. Port site is clear

## 2021-09-10 NOTE — ASSESSMENT
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 \par Patient was seen by [facility].\par # Thyroid nodule: will be assessed with [facility] at a later date, pt will follow with PCP regarding this.\par # Gall bladder polyp : saw GI  already\par \par RECOMMENDATIONS:\par I had a detailed discussion with the patient . I reviewed the radiological findings, pathology and staging with her.\par We also discussed the Oncotype Dx results at length. Her Oncotype score is high and predicts significant benefit from chemotherapy.\par I reviewed the natural history of high grade HR+, HER2 neg breast cancer and how it differs from other types of breast cancer. I discussed that LN positive breast cancers tend to have a higher risk of systemic recurrence.\par therefore adjuvant chemotherapy and endocrine therapy is  usually recommended especially in premenopausal women.\par \par She is an appropriate candidate for adjuvant chemotherapy. We discussed the goal of such treatment is to improve risk of recurrence and overall survival.\par Multiple data sets have established the superiority of adjuvant anthracycline based chemotherapy in LN positive breast cancer. Therefore she was recommended AC followed by Taxol, Her EF was normal with LVEF 55-65% on 6/3/2021.\par \par Adriamycin and Cytoxan will be administered every 2 weeks X 4 with Neulasta support which she completed without any serious adverse reactions followed by Taxol \par \par - taxol will be started at 175mg/m2 every 2 weeks X 4. Cycle # 1 today, CBC is adequate. Will hold off on Neulasta unless WBC drops significantly.\par \par I discussed side effects which include but are not limited to nausea, vomiting, hair loss, bone marrow suppression, cytopenia, increased risk of infection, sepsis, diarrhea, fatigue, allergic reaction and peripheral neuropathy. Overall the risk benefit ratio favors treatment with chemotherapy \par \par Monitoring will be with each cycle Q 2 weeks\par CBC (with differential and platelet count), serum electrolytes, serum creatinine and BUN, CrCl, LFTs; signs/symptoms of hypersensitivity reactions.\par \par - SUPPORTIVE MEASURES discussed\par Zofran 8 mg Q 8 hrs prn for emesis and nausea.\par Compazine  Q 6 hrs prn\par Encourage adequate fluid intake,\par GI prophylaxis with PPI\par \par We also discussed need for endocrine therapy with OFS and AI after chemo and RT are completed.\par - Continue Zoladex  monthly\par Side effects of OFS were discussed including hot flashes, amenorrhea, bone loss, wt gain etc.\par \par \par All of her questions and concerns were addressed.\par \par \par Due to COVID 19, pre-visit patient instructions were explained to the patient and their symptoms were checked upon arrival. \par Masks were used by the health care providers and staff and the examination room was cleaned before and after the patient visit was completed.\par Pt was encouraged to get Covid vaccine.\par \par RTC in 2 weeks\par

## 2021-09-10 NOTE — HISTORY OF PRESENT ILLNESS
[de-identified] : This is a 45 premenopausal woman being seen for wt loss.\par Pt has Lost 15 lbs in the last 6 mths. \par She has a good appetite.\par C/o having drenching  night sweats more often.\par She is known to have enlarged  neck Lns, saw ENt however could not get FNA as the nodes were too small.\par Had recent change in bowel habits over the last month or so with constipation and diarrhea. She has a follow up with GI.\par Has heavy periods, has fibroids, will see GYN \par She has an enlarged LNs in the groin\par colonoscopy done in 12/2/19 showed 7mm ascending colon polyp.\par EGD non erosive gastritis [de-identified] : ONCOTYPE DX RS 32.\par RR at 9 yrs 25%.\par With chemo benefit from chemo 15% [de-identified] : 5/11/21\par Pt is here for follow up.\par She is here to discuss results and further plan of care.\par She has completed breast biopsy.\par She has surgery appt today.\par She also has a GI appt later in the week\par \par 6/28/21\par Pt is here for follow up.\par She is s/p B/L mastectomy with TE insertion.\par There were no post op complications.\par She still has a drain in place.\par She is here to discuss adjuvant therapy.\par Pt still is menstruating.\par \par 7/29/21\par pt is here for follow up.\par She is s/p 1 cycle of AC. She tolerated it well. No N, V, D, mouth sores, fever.\par Has gastric reflux.\par Had bleeding post Zoladex injection\par \par 8/12/2021\par Patient is here to follow up for breast cancer\par She is s/p cycle #2 Adriamycin/Cytoxan, tolerating well\par She is also on Zoladex\par She feels well today, appetite is good\par She denies nausea, vomiting, or any chest wall concerns\par She c/o of mild spasm after voiding even after completing Cipro x 5days\par \par 8/25/21\par Pt is here for follow up.\par Tolerating chemo with some SE. Nausea controlled with Zofran. Had UTI few weeks ago, treated with ABX, resolved.\par No menstrual bleeding since starting Zoladex.\par Will be seeing Dr Suggs.\par \par 9/9/21\par pt is here for follow up.\par She is feeling well.\par No urinary symptoms. denies fever, mouth sores, N, V, D

## 2021-09-14 ENCOUNTER — OUTPATIENT (OUTPATIENT)
Dept: OUTPATIENT SERVICES | Facility: HOSPITAL | Age: 46
LOS: 1 days | Discharge: HOME | End: 2021-09-14
Payer: COMMERCIAL

## 2021-09-14 ENCOUNTER — APPOINTMENT (OUTPATIENT)
Dept: GYNECOLOGIC ONCOLOGY | Facility: CLINIC | Age: 46
End: 2021-09-14
Payer: COMMERCIAL

## 2021-09-14 ENCOUNTER — RESULT REVIEW (OUTPATIENT)
Age: 46
End: 2021-09-14

## 2021-09-14 VITALS
BODY MASS INDEX: 21.16 KG/M2 | DIASTOLIC BLOOD PRESSURE: 86 MMHG | WEIGHT: 127 LBS | HEIGHT: 65 IN | SYSTOLIC BLOOD PRESSURE: 126 MMHG | TEMPERATURE: 98.2 F

## 2021-09-14 DIAGNOSIS — M67.40 GANGLION, UNSPECIFIED SITE: Chronic | ICD-10-CM

## 2021-09-14 DIAGNOSIS — Z90.49 ACQUIRED ABSENCE OF OTHER SPECIFIED PARTS OF DIGESTIVE TRACT: Chronic | ICD-10-CM

## 2021-09-14 DIAGNOSIS — Z98.890 OTHER SPECIFIED POSTPROCEDURAL STATES: Chronic | ICD-10-CM

## 2021-09-14 DIAGNOSIS — K82.4 CHOLESTEROLOSIS OF GALLBLADDER: ICD-10-CM

## 2021-09-14 DIAGNOSIS — Z87.42 PERSONAL HISTORY OF OTHER DISEASES OF THE FEMALE GENITAL TRACT: ICD-10-CM

## 2021-09-14 DIAGNOSIS — Z90.13 ACQUIRED ABSENCE OF BILATERAL BREASTS AND NIPPLES: Chronic | ICD-10-CM

## 2021-09-14 PROCEDURE — 99205 OFFICE O/P NEW HI 60 MIN: CPT

## 2021-09-14 PROCEDURE — 76705 ECHO EXAM OF ABDOMEN: CPT | Mod: 26

## 2021-09-14 NOTE — LETTER BODY
[I had the pleasure of evaluating your patient, [unfilled] for ___] : I had the pleasure of evaluating your patient, [unfilled] for [unfilled]. [Attached please find my note.] : Attached please find my note.

## 2021-09-14 NOTE — HISTORY OF PRESENT ILLNESS
[FreeTextEntry1] : 46 year old patient of Dr. Little,   (spont abx1) with newly diagnosed Breast Cancer requesting risk reducing surgery.  Patient was treated for breast cancer with bilateral mastectomies, chemotherapy and radiation.  She underwent breast reconstruction.  She is currently receiving Zoladex injection monthly.   She has a history of ovarian cysts.   CA--125 is negative (11). She is negative for BRCA or any other known genetic mutations.  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     She presents for consultation for risk reducing surgery.  The patient is not vaccinated for covid 19

## 2021-09-14 NOTE — ASSESSMENT
[FreeTextEntry1] : 46 year old patient of Dr. Little,   (spont abx1) with newly diagnosed Breast Cancer requesting risk reducing surgery.  Patient was treated for breast cancer with bilateral mastectomies, chemotherapy and radiation.  She underwent breast reconstruction.  She is currently receiving Zoladex injection monthly.   She has a history of ovarian cysts.   CA--125 is negative (11). She is negative for BRCA or any other known genetic mutations.  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     She presents for consultation for risk reducing surgery.  She plans on completing chemotherapy and is considering RR TLH/BSO prior to her radiation treatments.

## 2021-09-21 ENCOUNTER — LABORATORY RESULT (OUTPATIENT)
Age: 46
End: 2021-09-21

## 2021-09-21 ENCOUNTER — APPOINTMENT (OUTPATIENT)
Dept: HEMATOLOGY ONCOLOGY | Facility: CLINIC | Age: 46
End: 2021-09-21
Payer: COMMERCIAL

## 2021-09-21 ENCOUNTER — APPOINTMENT (OUTPATIENT)
Dept: INFUSION THERAPY | Facility: CLINIC | Age: 46
End: 2021-09-21
Payer: COMMERCIAL

## 2021-09-21 LAB
HCT VFR BLD CALC: 30.2 %
HGB BLD-MCNC: 9.5 G/DL
MCHC RBC-ENTMCNC: 29.1 PG
MCHC RBC-ENTMCNC: 31.5 G/DL
MCV RBC AUTO: 92.6 FL
PLATELET # BLD AUTO: 344 K/UL
PMV BLD: 8.5 FL
RBC # BLD: 3.26 M/UL
RBC # FLD: 23.6 %
WBC # FLD AUTO: 2.95 K/UL

## 2021-09-21 PROCEDURE — 99213 OFFICE O/P EST LOW 20 MIN: CPT

## 2021-09-21 RX ORDER — FILGRASTIM 480MCG/1.6
300 VIAL (ML) INJECTION ONCE
Refills: 0 | Status: COMPLETED | OUTPATIENT
Start: 2021-09-21 | End: 2021-09-21

## 2021-09-21 RX ADMIN — Medication 300 MICROGRAM(S): at 13:38

## 2021-09-21 NOTE — HISTORY OF PRESENT ILLNESS
[de-identified] : This is a 45 premenopausal woman being seen for wt loss.\par Pt has Lost 15 lbs in the last 6 mths. \par She has a good appetite.\par C/o having drenching  night sweats more often.\par She is known to have enlarged  neck Lns, saw ENt however could not get FNA as the nodes were too small.\par Had recent change in bowel habits over the last month or so with constipation and diarrhea. She has a follow up with GI.\par Has heavy periods, has fibroids, will see GYN \par She has an enlarged LNs in the groin\par colonoscopy done in 12/2/19 showed 7mm ascending colon polyp.\par EGD non erosive gastritis [de-identified] : ONCOTYPE DX RS 32.\par RR at 9 yrs 25%.\par With chemo benefit from chemo 15% [de-identified] : 5/11/21\par Pt is here for follow up.\par She is here to discuss results and further plan of care.\par She has completed breast biopsy.\par She has surgery appt today.\par She also has a GI appt later in the week\par \par 6/28/21\par Pt is here for follow up.\par She is s/p B/L mastectomy with TE insertion.\par There were no post op complications.\par She still has a drain in place.\par She is here to discuss adjuvant therapy.\par Pt still is menstruating.\par \par 7/29/21\par pt is here for follow up.\par She is s/p 1 cycle of AC. She tolerated it well. No N, V, D, mouth sores, fever.\par Has gastric reflux.\par Had bleeding post Zoladex injection\par \par 8/12/2021\par Patient is here to follow up for breast cancer\par She is s/p cycle #2 Adriamycin/Cytoxan, tolerating well\par She is also on Zoladex\par She feels well today, appetite is good\par She denies nausea, vomiting, or any chest wall concerns\par She c/o of mild spasm after voiding even after completing Cipro x 5days\par \par 8/25/21\par Pt is here for follow up.\par Tolerating chemo with some SE. Nausea controlled with Zofran. Had UTI few weeks ago, treated with ABX, resolved.\par No menstrual bleeding since starting Zoladex.\par Will be seeing Dr Suggs.\par \par 9/9/21\par pt is here for follow up.\par She is feeling well.\par No urinary symptoms. denies fever, mouth sores, N, V, D\par \par 9/21/21\par Pt is here for follow up.\par She is feeling well. She offers no new complaints.\par No urinary symptoms. denies fever, mouth sores, N, V, D\par CBC today shows wbc=2.95, ANC=1.56, Hgb=9.5, Cgmt=840.

## 2021-09-21 NOTE — CONSULT LETTER
[Dear  ___] : Dear  [unfilled], [Consult Letter:] : I had the pleasure of evaluating your patient, [unfilled]. [Please see my note below.] : Please see my note below. [Consult Closing:] : Thank you very much for allowing me to participate in the care of this patient.  If you have any questions, please do not hesitate to contact me. [Sincerely,] : Sincerely, [DrMele  ___] : Dr. KIM [FreeTextEntry3] : Wolfgang Little MD\par Professor [employer]\par [employer]\par Attending Physician\par [facility] [place]\par [phone]\par \par

## 2021-09-21 NOTE — ASSESSMENT
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 \par Patient was seen by [facility].\par # Thyroid nodule: will be assessed with [facility] at a later date, pt will follow with PCP regarding this.\par # Gall bladder polyp : saw GI  already\par \par RECOMMENDATIONS:\par At the previous visit, Dr. Little had a detailed discussion with the patient and reviewed the radiological findings, pathology and staging with her.\par Discussed the Oncotype Dx results at length. Her Oncotype score is high and predicts significant benefit from chemotherapy.\par Reviewed the natural history of high grade HR+, HER2 neg breast cancer and how it differs from other types of breast cancer. I discussed that LN positive breast cancers tend to have a higher risk of systemic recurrence.\par therefore adjuvant chemotherapy and endocrine therapy is  usually recommended especially in premenopausal women.\par \par She is an appropriate candidate for adjuvant chemotherapy. We discussed the goal of such treatment is to improve risk of recurrence and overall survival.\par Multiple data sets have established the superiority of adjuvant anthracycline based chemotherapy in LN positive breast cancer. Therefore she was recommended AC followed by Taxol, Her EF was normal with LVEF 55-65% on 6/3/2021.\par \par Adriamycin and Cytoxan will be administered every 2 weeks X 4 with Neulasta support which she completed without any serious adverse reactions followed by Taxol \par \par - Taxol was started at 175mg/m2 every 2 weeks X 4. S/P Cycle # 1 on 9/10/21.\par CBC today shows wbc=2.95, ANC=1.56, Hgb=9.5, Kxct=193.Due to leukopenia, Neupogen 300 mcg SQ x 3 days was ordered. Repeat CBC and possible Taxol cycle 2 on 9/24/21.\par \par I discussed side effects which include but are not limited to nausea, vomiting, hair loss, bone marrow suppression, cytopenia, increased risk of infection, sepsis, diarrhea, fatigue, allergic reaction and peripheral neuropathy. Overall the risk benefit ratio favors treatment with chemotherapy \par \par Monitoring will be with each cycle Q 2 weeks\par CBC (with differential and platelet count), serum electrolytes, serum creatinine and BUN, CrCl, LFTs; signs/symptoms of hypersensitivity reactions.\par \par - SUPPORTIVE MEASURES discussed\par Zofran 8 mg Q 8 hrs prn for emesis and nausea.\par Compazine  Q 6 hrs prn\par Encourage adequate fluid intake,\par GI prophylaxis with PPI\par \par We also discussed need for endocrine therapy with OFS and AI after chemo and RT are completed.\par - Continue Zoladex  monthly\par Side effects of OFS were discussed including hot flashes, amenorrhea, bone loss, wt gain etc.\par \par All of her questions and concerns were addressed.\par \par Due to COVID 19, pre-visit patient instructions were explained to the patient and their symptoms were checked upon arrival. \par Masks were used by the health care providers and staff and the examination room was cleaned before and after the patient visit was completed.\par Pt was encouraged to get Covid vaccine.\par \par RTC in 2 weeks with Dr. Little.\par \par Case was discussed with Dr. Basurto in Dr. Little's absence.\par

## 2021-09-22 ENCOUNTER — APPOINTMENT (OUTPATIENT)
Dept: INFUSION THERAPY | Facility: CLINIC | Age: 46
End: 2021-09-22

## 2021-09-22 LAB
ALBUMIN SERPL ELPH-MCNC: 5 G/DL
ALP BLD-CCNC: 80 U/L
ALT SERPL-CCNC: 28 U/L
ANION GAP SERPL CALC-SCNC: 10 MMOL/L
AST SERPL-CCNC: 27 U/L
BILIRUB SERPL-MCNC: <0.2 MG/DL
BUN SERPL-MCNC: 16 MG/DL
CALCIUM SERPL-MCNC: 9.5 MG/DL
CHLORIDE SERPL-SCNC: 106 MMOL/L
CO2 SERPL-SCNC: 27 MMOL/L
CREAT SERPL-MCNC: 0.7 MG/DL
GLUCOSE SERPL-MCNC: 96 MG/DL
POTASSIUM SERPL-SCNC: 4.5 MMOL/L
PROT SERPL-MCNC: 7.2 G/DL
SODIUM SERPL-SCNC: 143 MMOL/L

## 2021-09-22 RX ORDER — FILGRASTIM 480MCG/1.6
300 VIAL (ML) INJECTION ONCE
Refills: 0 | Status: COMPLETED | OUTPATIENT
Start: 2021-09-22 | End: 2021-09-22

## 2021-09-22 RX ADMIN — Medication 300 MICROGRAM(S): at 10:17

## 2021-09-23 ENCOUNTER — APPOINTMENT (OUTPATIENT)
Dept: INFUSION THERAPY | Facility: CLINIC | Age: 46
End: 2021-09-23

## 2021-09-23 RX ORDER — FILGRASTIM 480MCG/1.6
300 VIAL (ML) INJECTION ONCE
Refills: 0 | Status: COMPLETED | OUTPATIENT
Start: 2021-09-23 | End: 2021-09-23

## 2021-09-23 RX ADMIN — Medication 300 MICROGRAM(S): at 08:41

## 2021-09-24 ENCOUNTER — LABORATORY RESULT (OUTPATIENT)
Age: 46
End: 2021-09-24

## 2021-09-24 ENCOUNTER — APPOINTMENT (OUTPATIENT)
Dept: INFUSION THERAPY | Facility: CLINIC | Age: 46
End: 2021-09-24

## 2021-09-24 RX ORDER — FAMOTIDINE 10 MG/ML
20 INJECTION INTRAVENOUS ONCE
Refills: 0 | Status: DISCONTINUED | OUTPATIENT
Start: 2021-09-24 | End: 2021-09-24

## 2021-09-24 RX ORDER — DIPHENHYDRAMINE HCL 50 MG
50 CAPSULE ORAL ONCE
Refills: 0 | Status: DISCONTINUED | OUTPATIENT
Start: 2021-09-24 | End: 2021-09-24

## 2021-09-24 RX ORDER — DEXAMETHASONE 0.5 MG/5ML
20 ELIXIR ORAL ONCE
Refills: 0 | Status: COMPLETED | OUTPATIENT
Start: 2021-09-24 | End: 2021-09-24

## 2021-09-24 RX ORDER — PACLITAXEL 6 MG/ML
285 INJECTION, SOLUTION, CONCENTRATE INTRAVENOUS ONCE
Refills: 0 | Status: COMPLETED | OUTPATIENT
Start: 2021-09-24 | End: 2021-09-24

## 2021-09-24 RX ORDER — FAMOTIDINE 10 MG/ML
20 INJECTION INTRAVENOUS ONCE
Refills: 0 | Status: COMPLETED | OUTPATIENT
Start: 2021-09-24 | End: 2021-09-24

## 2021-09-24 RX ORDER — DIPHENHYDRAMINE HCL 50 MG
50 CAPSULE ORAL ONCE
Refills: 0 | Status: COMPLETED | OUTPATIENT
Start: 2021-09-24 | End: 2021-09-24

## 2021-09-24 RX ADMIN — Medication 126 MILLIGRAM(S): at 09:29

## 2021-09-24 RX ADMIN — FAMOTIDINE 104 MILLIGRAM(S): 10 INJECTION INTRAVENOUS at 09:29

## 2021-09-24 RX ADMIN — PACLITAXEL 182.5 MILLIGRAM(S): 6 INJECTION, SOLUTION, CONCENTRATE INTRAVENOUS at 09:44

## 2021-09-24 RX ADMIN — Medication 102 MILLIGRAM(S): at 09:29

## 2021-09-27 LAB
HCT VFR BLD CALC: 33.3 %
HGB BLD-MCNC: 10.5 G/DL
MCHC RBC-ENTMCNC: 28.9 PG
MCHC RBC-ENTMCNC: 31.5 G/DL
MCV RBC AUTO: 91.7 FL
PLATELET # BLD AUTO: 159 K/UL
PMV BLD: 10.7 FL
RBC # BLD: 3.63 M/UL
RBC # FLD: 23 %
WBC # FLD AUTO: 29.46 K/UL

## 2021-10-05 ENCOUNTER — APPOINTMENT (OUTPATIENT)
Dept: HEMATOLOGY ONCOLOGY | Facility: CLINIC | Age: 46
End: 2021-10-05
Payer: COMMERCIAL

## 2021-10-05 ENCOUNTER — LABORATORY RESULT (OUTPATIENT)
Age: 46
End: 2021-10-05

## 2021-10-05 ENCOUNTER — APPOINTMENT (OUTPATIENT)
Dept: INFUSION THERAPY | Facility: CLINIC | Age: 46
End: 2021-10-05
Payer: COMMERCIAL

## 2021-10-05 VITALS
BODY MASS INDEX: 22.33 KG/M2 | HEIGHT: 65 IN | DIASTOLIC BLOOD PRESSURE: 84 MMHG | SYSTOLIC BLOOD PRESSURE: 156 MMHG | TEMPERATURE: 97.6 F | WEIGHT: 134 LBS | HEART RATE: 75 BPM

## 2021-10-05 LAB
HCT VFR BLD CALC: 28.5 %
HGB BLD-MCNC: 9.1 G/DL
MCHC RBC-ENTMCNC: 29.4 PG
MCHC RBC-ENTMCNC: 31.9 G/DL
MCV RBC AUTO: 91.9 FL
PLATELET # BLD AUTO: 276 K/UL
PMV BLD: 8.4 FL
RBC # BLD: 3.1 M/UL
RBC # FLD: 22.1 %
WBC # FLD AUTO: 1.85 K/UL

## 2021-10-05 PROCEDURE — 99215 OFFICE O/P EST HI 40 MIN: CPT

## 2021-10-05 RX ORDER — FILGRASTIM 480MCG/1.6
300 VIAL (ML) INJECTION ONCE
Refills: 0 | Status: COMPLETED | OUTPATIENT
Start: 2021-10-05 | End: 2021-10-05

## 2021-10-05 RX ORDER — GOSERELIN ACETATE 10.8 MG/1
3.6 IMPLANT SUBCUTANEOUS ONCE
Refills: 0 | Status: COMPLETED | OUTPATIENT
Start: 2021-10-05 | End: 2021-10-05

## 2021-10-05 RX ADMIN — GOSERELIN ACETATE 3.6 MILLIGRAM(S): 10.8 IMPLANT SUBCUTANEOUS at 13:16

## 2021-10-05 RX ADMIN — Medication 300 MICROGRAM(S): at 13:16

## 2021-10-06 ENCOUNTER — APPOINTMENT (OUTPATIENT)
Dept: INFUSION THERAPY | Facility: CLINIC | Age: 46
End: 2021-10-06

## 2021-10-06 RX ORDER — FILGRASTIM 480MCG/1.6
300 VIAL (ML) INJECTION ONCE
Refills: 0 | Status: COMPLETED | OUTPATIENT
Start: 2021-10-06 | End: 2021-10-06

## 2021-10-06 RX ADMIN — Medication 300 MICROGRAM(S): at 09:01

## 2021-10-07 ENCOUNTER — APPOINTMENT (OUTPATIENT)
Dept: INFUSION THERAPY | Facility: CLINIC | Age: 46
End: 2021-10-07

## 2021-10-07 RX ORDER — FILGRASTIM 480MCG/1.6
300 VIAL (ML) INJECTION ONCE
Refills: 0 | Status: COMPLETED | OUTPATIENT
Start: 2021-10-07 | End: 2021-10-07

## 2021-10-07 RX ADMIN — Medication 300 MICROGRAM(S): at 08:47

## 2021-10-08 ENCOUNTER — LABORATORY RESULT (OUTPATIENT)
Age: 46
End: 2021-10-08

## 2021-10-08 ENCOUNTER — APPOINTMENT (OUTPATIENT)
Dept: INFUSION THERAPY | Facility: CLINIC | Age: 46
End: 2021-10-08

## 2021-10-08 RX ORDER — DEXAMETHASONE 0.5 MG/5ML
20 ELIXIR ORAL ONCE
Refills: 0 | Status: COMPLETED | OUTPATIENT
Start: 2021-10-08 | End: 2021-10-08

## 2021-10-08 RX ORDER — FAMOTIDINE 10 MG/ML
20 INJECTION INTRAVENOUS ONCE
Refills: 0 | Status: COMPLETED | OUTPATIENT
Start: 2021-10-08 | End: 2021-10-08

## 2021-10-08 RX ORDER — GOSERELIN ACETATE 10.8 MG/1
3.6 IMPLANT SUBCUTANEOUS ONCE
Refills: 0 | Status: COMPLETED | OUTPATIENT
Start: 2021-10-08 | End: 2021-10-08

## 2021-10-08 RX ORDER — PEGFILGRASTIM-CBQV 6 MG/.6ML
6 INJECTION, SOLUTION SUBCUTANEOUS ONCE
Refills: 0 | Status: COMPLETED | OUTPATIENT
Start: 2021-10-08 | End: 2021-10-08

## 2021-10-08 RX ORDER — PACLITAXEL 6 MG/ML
285 INJECTION, SOLUTION, CONCENTRATE INTRAVENOUS ONCE
Refills: 0 | Status: COMPLETED | OUTPATIENT
Start: 2021-10-08 | End: 2021-10-08

## 2021-10-08 RX ORDER — DIPHENHYDRAMINE HCL 50 MG
50 CAPSULE ORAL ONCE
Refills: 0 | Status: COMPLETED | OUTPATIENT
Start: 2021-10-08 | End: 2021-10-08

## 2021-10-08 RX ADMIN — Medication 102 MILLIGRAM(S): at 08:53

## 2021-10-08 RX ADMIN — GOSERELIN ACETATE 3.6 MILLIGRAM(S): 10.8 IMPLANT SUBCUTANEOUS at 10:59

## 2021-10-08 RX ADMIN — FAMOTIDINE 104 MILLIGRAM(S): 10 INJECTION INTRAVENOUS at 08:53

## 2021-10-08 RX ADMIN — PEGFILGRASTIM-CBQV 6 MILLIGRAM(S): 6 INJECTION, SOLUTION SUBCUTANEOUS at 10:59

## 2021-10-08 RX ADMIN — Medication 126 MILLIGRAM(S): at 08:52

## 2021-10-08 RX ADMIN — PACLITAXEL 182.5 MILLIGRAM(S): 6 INJECTION, SOLUTION, CONCENTRATE INTRAVENOUS at 09:48

## 2021-10-13 LAB
HCT VFR BLD CALC: 32.2 %
HGB BLD-MCNC: 10.4 G/DL
MCHC RBC-ENTMCNC: 29.4 PG
MCHC RBC-ENTMCNC: 32.3 G/DL
MCV RBC AUTO: 91 FL
PLATELET # BLD AUTO: 322 K/UL
PMV BLD: 9.1 FL
RBC # BLD: 3.54 M/UL
RBC # FLD: 21.4 %
WBC # FLD AUTO: 18.31 K/UL

## 2021-10-13 NOTE — CONSULT LETTER
[Dear  ___] : Dear  [unfilled], [Consult Letter:] : I had the pleasure of evaluating your patient, [unfilled]. [Please see my note below.] : Please see my note below. [Consult Closing:] : Thank you very much for allowing me to participate in the care of this patient.  If you have any questions, please do not hesitate to contact me. [Sincerely,] : Sincerely, [DrMele  ___] : Dr. KIM [FreeTextEntry3] : Wolfgang Little MD\par Professor [employer]\par [employer]\par Attending Physician\par [facility] [place]\par [phone]\par \par

## 2021-10-13 NOTE — ASSESSMENT
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 \par Patient was seen by [facility].\par # Thyroid nodule: will be assessed with [facility] at a later date, pt will follow with PCP regarding this.\par # Gall bladder polyp : saw GI  already\par \par RECOMMENDATIONS:\par We had a detailed discussion with the patient and reviewed the radiological findings, pathology and staging with her.\par Discussed the Oncotype Dx results at length. Her Oncotype score is high and predicts significant benefit from chemotherapy.\par Reviewed the natural history of high grade HR+, HER2 neg breast cancer and how it differs from other types of breast cancer. I discussed that LN positive breast cancers tend to have a higher risk of systemic recurrence.\par therefore adjuvant chemotherapy and endocrine therapy is  usually recommended especially in premenopausal women.\par \par She is an appropriate candidate for adjuvant chemotherapy. We discussed the goal of such treatment is to improve risk of recurrence and overall survival.\par Multiple data sets have established the superiority of adjuvant anthracycline based chemotherapy in LN positive breast cancer. Therefore she was recommended AC followed by Taxol, Her EF was normal with LVEF 55-65% on 6/3/2021.\par \par Adriamycin and Cytoxan will be administered every 2 weeks X 4 with Neulasta support which she completed without any serious adverse reactions followed by Taxol \par \par - Taxol was started at 175mg/m2 every 2 weeks X 4. S/P Cycle # 3\par  \par CBC is  not adequate today will give her 3 does of Zarxio 300mcg daily.\par Cycle #4 of chemo will be scheduled in 3 days and will be given with Neulasta\par I discussed side effects which include but are not limited to nausea, vomiting, hair loss, bone marrow suppression, cytopenia, increased risk of infection, sepsis, diarrhea, fatigue, allergic reaction and peripheral neuropathy. Overall the risk benefit ratio favors treatment with chemotherapy \par \par Monitoring will be with each cycle Q 2 weeks\par CBC (with differential and platelet count), serum electrolytes, serum creatinine and BUN, CrCl, LFTs; signs/symptoms of hypersensitivity reactions.\par \par - SUPPORTIVE MEASURES discussed\par Zofran 8 mg Q 8 hrs prn for emesis and nausea.\par Compazine  Q 6 hrs prn\par Encourage adequate fluid intake,\par GI prophylaxis with PPI\par \par We also discussed need for endocrine therapy with OFS and AI after chemo and RT are completed.\par - Continue Zoladex  monthly\par Side effects of OFS were discussed including hot flashes, amenorrhea, bone loss, wt gain etc.\par \par \par Rad/onc follow up\par All of her questions and concerns were addressed.\par \par Due to COVID 19, pre-visit patient instructions were explained to the patient and their symptoms were checked upon arrival. \par Masks were used by the health care providers and staff and the examination room was cleaned before and after the patient visit was completed.\par Pt has recd first Covid vaccine\par \par RTC in 4 weeks \par \par

## 2021-10-13 NOTE — HISTORY OF PRESENT ILLNESS
[de-identified] : This is a 45 premenopausal woman being seen for wt loss.\par Pt has Lost 15 lbs in the last 6 mths. \par She has a good appetite.\par C/o having drenching  night sweats more often.\par She is known to have enlarged  neck Lns, saw ENt however could not get FNA as the nodes were too small.\par Had recent change in bowel habits over the last month or so with constipation and diarrhea. She has a follow up with GI.\par Has heavy periods, has fibroids, will see GYN \par She has an enlarged LNs in the groin\par colonoscopy done in 12/2/19 showed 7mm ascending colon polyp.\par EGD non erosive gastritis [de-identified] : ONCOTYPE DX RS 32.\par RR at 9 yrs 25%.\par With chemo benefit from chemo 15% [de-identified] : 5/11/21\par Pt is here for follow up.\par She is here to discuss results and further plan of care.\par She has completed breast biopsy.\par She has surgery appt today.\par She also has a GI appt later in the week\par \par 6/28/21\par Pt is here for follow up.\par She is s/p B/L mastectomy with TE insertion.\par There were no post op complications.\par She still has a drain in place.\par She is here to discuss adjuvant therapy.\par Pt still is menstruating.\par \par 7/29/21\par pt is here for follow up.\par She is s/p 1 cycle of AC. She tolerated it well. No N, V, D, mouth sores, fever.\par Has gastric reflux.\par Had bleeding post Zoladex injection\par \par 8/12/2021\par Patient is here to follow up for breast cancer\par She is s/p cycle #2 Adriamycin/Cytoxan, tolerating well\par She is also on Zoladex\par She feels well today, appetite is good\par She denies nausea, vomiting, or any chest wall concerns\par She c/o of mild spasm after voiding even after completing Cipro x 5days\par \par 8/25/21\par Pt is here for follow up.\par Tolerating chemo with some SE. Nausea controlled with Zofran. Had UTI few weeks ago, treated with ABX, resolved.\par No menstrual bleeding since starting Zoladex.\par Will be seeing Dr Suggs.\par \par 9/9/21\par pt is here for follow up.\par She is feeling well.\par No urinary symptoms. denies fever, mouth sores, N, V, D\par \par 9/21/21\par Pt is here for follow up.\par She is feeling well. She offers no new complaints.\par No urinary symptoms. denies fever, mouth sores, N, V, D\par CBC today shows wbc=2.95, ANC=1.56, Hgb=9.5, Itsk=575.\par \par 10/5/21\par Pt is here for follow up.\par C/O aches and pains since being on Taxol.\par No fever.\par

## 2021-10-19 ENCOUNTER — APPOINTMENT (OUTPATIENT)
Dept: HEMATOLOGY ONCOLOGY | Facility: CLINIC | Age: 46
End: 2021-10-19
Payer: COMMERCIAL

## 2021-10-19 ENCOUNTER — LABORATORY RESULT (OUTPATIENT)
Age: 46
End: 2021-10-19

## 2021-10-19 VITALS
SYSTOLIC BLOOD PRESSURE: 147 MMHG | TEMPERATURE: 98.3 F | HEART RATE: 67 BPM | DIASTOLIC BLOOD PRESSURE: 88 MMHG | HEIGHT: 65 IN | WEIGHT: 134 LBS | BODY MASS INDEX: 22.33 KG/M2

## 2021-10-19 DIAGNOSIS — Z51.11 ENCOUNTER FOR ANTINEOPLASTIC CHEMOTHERAPY: ICD-10-CM

## 2021-10-19 PROCEDURE — 99214 OFFICE O/P EST MOD 30 MIN: CPT

## 2021-10-22 ENCOUNTER — LABORATORY RESULT (OUTPATIENT)
Age: 46
End: 2021-10-22

## 2021-10-22 ENCOUNTER — APPOINTMENT (OUTPATIENT)
Dept: INFUSION THERAPY | Facility: CLINIC | Age: 46
End: 2021-10-22

## 2021-10-22 RX ORDER — PACLITAXEL 6 MG/ML
285 INJECTION, SOLUTION, CONCENTRATE INTRAVENOUS ONCE
Refills: 0 | Status: COMPLETED | OUTPATIENT
Start: 2021-10-22 | End: 2021-10-22

## 2021-10-22 RX ORDER — PEGFILGRASTIM-CBQV 6 MG/.6ML
6 INJECTION, SOLUTION SUBCUTANEOUS ONCE
Refills: 0 | Status: COMPLETED | OUTPATIENT
Start: 2021-10-22 | End: 2021-10-22

## 2021-10-22 RX ORDER — DEXAMETHASONE 0.5 MG/5ML
20 ELIXIR ORAL ONCE
Refills: 0 | Status: COMPLETED | OUTPATIENT
Start: 2021-10-22 | End: 2021-10-22

## 2021-10-22 RX ORDER — FAMOTIDINE 10 MG/ML
20 INJECTION INTRAVENOUS ONCE
Refills: 0 | Status: COMPLETED | OUTPATIENT
Start: 2021-10-22 | End: 2021-10-22

## 2021-10-22 RX ORDER — DIPHENHYDRAMINE HCL 50 MG
50 CAPSULE ORAL ONCE
Refills: 0 | Status: COMPLETED | OUTPATIENT
Start: 2021-10-22 | End: 2021-10-22

## 2021-10-22 RX ADMIN — Medication 102 MILLIGRAM(S): at 09:58

## 2021-10-22 RX ADMIN — Medication 126 MILLIGRAM(S): at 09:58

## 2021-10-22 RX ADMIN — PACLITAXEL 182.5 MILLIGRAM(S): 6 INJECTION, SOLUTION, CONCENTRATE INTRAVENOUS at 10:48

## 2021-10-22 RX ADMIN — FAMOTIDINE 104 MILLIGRAM(S): 10 INJECTION INTRAVENOUS at 09:58

## 2021-10-22 RX ADMIN — PEGFILGRASTIM-CBQV 6 MILLIGRAM(S): 6 INJECTION, SOLUTION SUBCUTANEOUS at 09:58

## 2021-10-24 PROBLEM — Z51.11 ENCOUNTER FOR ANTINEOPLASTIC CHEMOTHERAPY: Status: ACTIVE | Noted: 2021-06-28

## 2021-10-24 NOTE — ASSESSMENT
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 \par Patient was seen by [facility].\par # Thyroid nodule: will be assessed with [facility] at a later date, pt will follow with PCP regarding this.\par # Gall bladder polyp : saw GI  already\par \par RECOMMENDATIONS:\par We had a detailed discussion with the patient and reviewed the radiological findings, pathology and staging with her.\par Discussed the Oncotype Dx results at length. Her Oncotype score is high and predicts significant benefit from chemotherapy.\par Reviewed the natural history of high grade HR+, HER2 neg breast cancer and how it differs from other types of breast cancer. I discussed that LN positive breast cancers tend to have a higher risk of systemic recurrence.\par therefore adjuvant chemotherapy and endocrine therapy is  usually recommended especially in premenopausal women.\par \par She is an appropriate candidate for adjuvant chemotherapy. We discussed the goal of such treatment is to improve risk of recurrence and overall survival.\par Multiple data sets have established the superiority of adjuvant anthracycline based chemotherapy in LN positive breast cancer. Therefore she was recommended AC followed by Taxol, Her EF was normal with LVEF 55-65% on 6/3/2021.\par \par Adriamycin and Cytoxan will be administered every 2 weeks X 4 with Neulasta support which she completed without any serious adverse reactions followed by Taxol \par \par - Taxol was started at 175mg/m2 every 2 weeks X 4. S/P Cycle # 4\par  \par CBC is  adequate today \par Cycle #5 of chemo will be scheduled in 3 days and will be given with Neulasta\par I discussed side effects which include but are not limited to nausea, vomiting, hair loss, bone marrow suppression, cytopenia, increased risk of infection, sepsis, diarrhea, fatigue, allergic reaction and peripheral neuropathy. Overall the risk benefit ratio favors treatment with chemotherapy \par \par Monitoring will be with each cycle Q 2 weeks\par CBC (with differential and platelet count), serum electrolytes, serum creatinine and BUN, CrCl, LFTs; signs/symptoms of hypersensitivity reactions.\par \par - SUPPORTIVE MEASURES discussed\par Zofran 8 mg Q 8 hrs prn for emesis and nausea.\par Compazine  Q 6 hrs prn\par Encourage adequate fluid intake,\par GI prophylaxis with PPI\par \par We also discussed need for endocrine therapy with OFS and AI after chemo and RT are completed.\par - Continue Zoladex  monthly\par Side effects of OFS were discussed including hot flashes, amenorrhea, bone loss, wt gain etc.\par \par \par Rad/onc follow up\par All of her questions and concerns were addressed.\par \par Due to COVID 19, pre-visit patient instructions were explained to the patient and their symptoms were checked upon arrival. \par Masks were used by the health care providers and staff and the examination room was cleaned before and after the patient visit was completed.\par Pt has recd first Covid vaccine\par \par RTC in 4 weeks with Dr. Little.\par \par

## 2021-10-24 NOTE — PHYSICAL EXAM
[Restricted in physically strenuous activity but ambulatory and able to carry out work of a light or sedentary nature] : Status 1- Restricted in physically strenuous activity but ambulatory and able to carry out work of a light or sedentary nature, e.g., light house work, office work [Normal] : normal appearance, no rash, nodules, vesicles, ulcers, erythema [de-identified] : s/p bilateral mastectomies and TE placement.  No erythema or tenderness to palpation

## 2021-10-24 NOTE — HISTORY OF PRESENT ILLNESS
[de-identified] : This is a 45 premenopausal woman being seen for wt loss.\par Pt has Lost 15 lbs in the last 6 mths. \par She has a good appetite.\par C/o having drenching  night sweats more often.\par She is known to have enlarged  neck Lns, saw ENt however could not get FNA as the nodes were too small.\par Had recent change in bowel habits over the last month or so with constipation and diarrhea. She has a follow up with GI.\par Has heavy periods, has fibroids, will see GYN \par She has an enlarged LNs in the groin\par colonoscopy done in 12/2/19 showed 7mm ascending colon polyp.\par EGD non erosive gastritis [de-identified] : ONCOTYPE DX RS 32.\par RR at 7 yrs 25%.\par With chemo benefit from chemo 15% [de-identified] : 5/11/21\par Pt is here for follow up.\par She is here to discuss results and further plan of care.\par She has completed breast biopsy.\par She has surgery appt today.\par She also has a GI appt later in the week\par \par 6/28/21\par Pt is here for follow up.\par She is s/p B/L mastectomy with TE insertion.\par There were no post op complications.\par She still has a drain in place.\par She is here to discuss adjuvant therapy.\par Pt still is menstruating.\par \par 7/29/21\par pt is here for follow up.\par She is s/p 1 cycle of AC. She tolerated it well. No N, V, D, mouth sores, fever.\par Has gastric reflux.\par Had bleeding post Zoladex injection\par \par 8/12/2021\par Patient is here to follow up for breast cancer\par She is s/p cycle #2 Adriamycin/Cytoxan, tolerating well\par She is also on Zoladex\par She feels well today, appetite is good\par She denies nausea, vomiting, or any chest wall concerns\par She c/o of mild spasm after voiding even after completing Cipro x 5days\par \par 8/25/21\par Pt is here for follow up.\par Tolerating chemo with some SE. Nausea controlled with Zofran. Had UTI few weeks ago, treated with ABX, resolved.\par No menstrual bleeding since starting Zoladex.\par Will be seeing Dr Suggs.\par \par 9/9/21\par pt is here for follow up.\par She is feeling well.\par No urinary symptoms. denies fever, mouth sores, N, V, D\par \par 9/21/21\par Pt is here for follow up.\par She is feeling well. She offers no new complaints.\par No urinary symptoms. denies fever, mouth sores, N, V, D\par CBC today shows wbc=2.95, ANC=1.56, Hgb=9.5, Ucbk=064.\par \par 10/5/21\par Pt is here for follow up.\par C/O aches and pains since being on Taxol.\par No fever.\par \par 10/19/21\par Pt is here today for follow up visit for breast cancer.\par She is presently on Taxol/Neulasta and  c/o mild, intermittent paresthesias. \par Denies N/V/D/C, fever, pain.\par She will have radiation with Dr. Shah after chemo.\par She had her consult with Dr. 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

## 2021-10-25 LAB
ALBUMIN SERPL ELPH-MCNC: 4.7 G/DL
ALP BLD-CCNC: 129 U/L
ALT SERPL-CCNC: 19 U/L
ANION GAP SERPL CALC-SCNC: 15 MMOL/L
AST SERPL-CCNC: 21 U/L
BILIRUB SERPL-MCNC: 0.3 MG/DL
BUN SERPL-MCNC: 15 MG/DL
CALCIUM SERPL-MCNC: 8.8 MG/DL
CHLORIDE SERPL-SCNC: 100 MMOL/L
CO2 SERPL-SCNC: 21 MMOL/L
CREAT SERPL-MCNC: 0.8 MG/DL
GLUCOSE SERPL-MCNC: 87 MG/DL
HCT VFR BLD CALC: 33 %
HCT VFR BLD CALC: 33.3 %
HGB BLD-MCNC: 10.8 G/DL
HGB BLD-MCNC: 10.9 G/DL
MCHC RBC-ENTMCNC: 29.5 PG
MCHC RBC-ENTMCNC: 29.8 PG
MCHC RBC-ENTMCNC: 32.7 G/DL
MCHC RBC-ENTMCNC: 32.7 G/DL
MCV RBC AUTO: 90.2 FL
MCV RBC AUTO: 91.2 FL
PLATELET # BLD AUTO: 237 K/UL
PLATELET # BLD AUTO: 287 K/UL
PMV BLD: 10.3 FL
PMV BLD: 9.6 FL
POTASSIUM SERPL-SCNC: 4.2 MMOL/L
PROT SERPL-MCNC: 6.8 G/DL
RBC # BLD: 3.62 M/UL
RBC # BLD: 3.69 M/UL
RBC # FLD: 21.7 %
RBC # FLD: 22.2 %
SODIUM SERPL-SCNC: 136 MMOL/L
WBC # FLD AUTO: 19.08 K/UL
WBC # FLD AUTO: 7.44 K/UL

## 2021-11-05 ENCOUNTER — APPOINTMENT (OUTPATIENT)
Dept: INFUSION THERAPY | Facility: CLINIC | Age: 46
End: 2021-11-05

## 2021-11-05 RX ORDER — GOSERELIN ACETATE 10.8 MG/1
3.6 IMPLANT SUBCUTANEOUS ONCE
Refills: 0 | Status: COMPLETED | OUTPATIENT
Start: 2021-11-05 | End: 2021-11-05

## 2021-11-05 RX ADMIN — GOSERELIN ACETATE 3.6 MILLIGRAM(S): 10.8 IMPLANT SUBCUTANEOUS at 09:17

## 2021-11-10 ENCOUNTER — APPOINTMENT (OUTPATIENT)
Dept: RADIATION ONCOLOGY | Facility: HOSPITAL | Age: 46
End: 2021-11-10
Payer: COMMERCIAL

## 2021-11-10 VITALS
OXYGEN SATURATION: 97 % | RESPIRATION RATE: 12 BRPM | HEART RATE: 12 BPM | DIASTOLIC BLOOD PRESSURE: 91 MMHG | SYSTOLIC BLOOD PRESSURE: 162 MMHG | TEMPERATURE: 97.7 F | BODY MASS INDEX: 22.22 KG/M2 | WEIGHT: 133.5 LBS

## 2021-11-10 PROCEDURE — 99213 OFFICE O/P EST LOW 20 MIN: CPT

## 2021-11-10 RX ORDER — METOPROLOL SUCCINATE 50 MG/1
50 TABLET, EXTENDED RELEASE ORAL
Refills: 0 | Status: ACTIVE | COMMUNITY

## 2021-11-10 RX ORDER — METOPROLOL TARTRATE 50 MG/1
50 TABLET, FILM COATED ORAL
Refills: 0 | Status: DISCONTINUED | COMMUNITY
End: 2021-11-10

## 2021-11-10 NOTE — REVIEW OF SYSTEMS
[Fatigue] : fatigue [Constipation] : constipation [Diarrhea] : diarrhea [Joint Pain] : joint pain [Muscle Pain] : muscle pain [Dizziness] : dizziness [Insomnia] : insomnia [Hot Flashes] : hot flashes [Negative] : Integumentary [Fever] : no fever [Chills] : no chills [Dysphagia] : no dysphagia [Palpitations] : no palpitations [Lower Ext Edema] : no lower extremity edema [Confused] : no confusion [FreeTextEntry7] : at times

## 2021-11-10 NOTE — DISEASE MANAGEMENT
[Pathological] : TNM Stage: p [IIA] : IIA [FreeTextEntry4] : invasive ductal carcinoma of the right breast, upper outer quadrant, G3, ER /AZ positive / HER 2 negative  [TTNM] : 2 [NTNM] : 1a [MTNM] : 0 [de-identified] : right breast

## 2021-11-10 NOTE — HISTORY OF PRESENT ILLNESS
[FreeTextEntry1] : \par The patient is a 46 year old woman who works here at [employer].  She noted a palpable lump in the right breast in April.  Diagnostic mammogram and ultrasound on 4/29/2021 showed at 10:00 o'clock, N4 position, there is an irregular hypoechoic mass measuring 2.4 x 1.7 x 1.1 cm. This corresponds in size, shape and position to the mammographic finding in the region of clinical concern. No right axillary lymphadenopathy. Irregular hypoechoic mass in the right breast is highly suspicious for malignancy.  Additional small oval hypoechoic mass in the right breast is suspicious.  BI-RADS Category 5: Highly Suggestive of Malignancy.  Recommendation: Ultrasound guided biopsy x 2.\par \par On 4/29/2021, she had a biopsy of the right breast abnormality at 10 o’clock, 4 cm FN and pathology showed invasive poorly differentiated ductal carcinoma with scattered single cell necrosis, with DCIS.  It was ER/OR positive /HER 2 negative.  Ki-67 index was 30%.  At, right breast, 10 o'clock, 11 cm FN, it was benign breast tissue.  \par \par 5/5/2021 PET CT showed:  IMPRESSION:\par Pathologic FDG uptake (SUV 16.1) coregistering with right breast mass (biopsy-proven carcinoma).\par Nonspecific small focal FDG uptake (SUV 3.6) which appears to coregister within the left thyroid lobe.\par No additional definite sites of abnormal FDG uptake.\par \par She also had an MRI of bilateral breasts on 5/7/2021, which showed area of biopsy-proven enhancing malignant mass in the right breast. No additional area of abnormal enhancement in the right breast. No MRI evidence of malignancy in the left breast. Recommendation: Management as clinically appropriate.BI-RADS Category 6: Known Biopsy-Proven Malignancy.\par \par On 6/15/2021, she underwent bilateral skin sparing mastectomies, bilat SLN bx plus right ALND, and bilateral immediate TE reconstructions.  She was found to have a 3.5 cm  invasive ductal carcinoma in the right breast, G3 with DCIS, negative for EIC, ER/OR positive, HER 2 negative.  Surgical margins were negative.  1 of 2 sentinel lymph nodes and 2/24 axillary nodes were positive for metastatic carcinoma (Total 3/26LN).  There was LVSI present.  The left breast and SLN were benign.    \par \par She is scheduled to start chemotherapy on 7/16/2021 with Dr. Little,  ACT x 4 cycles  and Taxol  4 cycles , LCW medi-port  placed on 7/12/2021.  She has completed her chemotherapy as of 10/22/2021 .  Endocrine therapy with OFS and AI s/p radiation discussed with Dr. Little.  She is on Zoladex, injections monthly ( #5 last one on 11/5/2021).  \par \par She was evaluated by Dr. Suggs (9/2021) and discussed options for total hysterectomy with bilateral salpingo-oophorectomy.  She is planing for surgery post radiation.\par \par Today, patient returns for a f/u visit to discuss radiation.  She reports peripheral neuropathy and dizziness since starting her chemotherapy.  She describes the dizziness as, intermittent with positional changes, resolved with meclizine.  No past history of vertigo.  She has an upcoming apt with Cardio, Dr. Bowers for her dizziness.   She denies breast pain.  Otherwise, She has been doing well.  \par \par Med/Onc: Dr. Little\par Dr. Macias/  11/18/2021

## 2021-11-10 NOTE — PHYSICAL EXAM
[Sclera] : the sclera and conjunctiva were normal [Hearing Threshold Finger Rub Not [place]] : hearing was normal [] : no respiratory distress [Respiration, Rhythm And Depth] : normal respiratory rhythm and effort [Exaggerated Use Of Accessory Muscles For Inspiration] : no accessory muscle use [Auscultation Breath Sounds / Voice Sounds] : lungs were clear to auscultation bilaterally [Right Breast Absent] : a total mastectomy [Breast Reconstruction Right] : breast reconstruction [Left Breast Absent] : a total mastectomy [Breast Reconstruction Left] : breast reconstruction [Abdomen Soft] : soft [Nondistended] : nondistended [No Focal Deficits] : no focal deficits [Heart Rate And Rhythm] : heart rate and rhythm were normal [Heart Sounds] : normal S1 and S2 [Murmurs] : no murmurs present [Edema] : no peripheral edema present [Cervical Lymph Nodes Enlarged Posterior Bilaterally] : posterior cervical [Cervical Lymph Nodes Enlarged Anterior Bilaterally] : anterior cervical [Supraclavicular Lymph Nodes Enlarged Bilaterally] : supraclavicular [Axillary Lymph Nodes Enlarged Bilaterally] : axillary [___] : no erythema [Tenderness Of The Right Breast] : no tenderness [Normal] : no palpable adenopathy [de-identified] : No palpable chest wall nodules.

## 2021-11-10 NOTE — ASSESSMENT
[No evidence of disease] : No evidence of disease [FreeTextEntry1] : invasive ductal carcinoma, right breast s/p chemotherapy, planning for XRT

## 2021-11-12 ENCOUNTER — APPOINTMENT (OUTPATIENT)
Dept: PLASTIC SURGERY | Facility: CLINIC | Age: 46
End: 2021-11-12
Payer: COMMERCIAL

## 2021-11-12 PROCEDURE — 99212 OFFICE O/P EST SF 10 MIN: CPT

## 2021-11-12 NOTE — HISTORY OF PRESENT ILLNESS
[FreeTextEntry1] : 44 yo   F with PMHx of anxiety/depression, HTN, GERD, asthma who was recently dx with RIGHT invasive ductal carcinoma and DCIS 2.4 cm @ 10:00 (ER+/IN+/HER2(-)) after palpable mass in right breast.  Denies breast pain, nipple discharge and retraction.  Here with friend, Rukhsana.\par \par Patient is learning toward bilateral mastectomy.  She is inquiring about immediate pre-pec implant based reconstruction.  She would like simplest recovery. 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  She would like a full B in reconstructive volume.\par Recent lost weight 8 lbs. currently 118 lbs.  Mother passed away recently 2 months ago.\par \par Interval hx (21). Patient presents today POD#8 s/p BL SSM, Rt axillary dissection with immediate prepectoral TE reconstruction (filled to 100cc) with AlloDerm. Doing very well with adequate pain management. Taking oral antibiotics as prescribed. Denies any f/c or bleeding. Drains all functional with appropriate output. Rt axillary drain with minimal 5-7 cc daily. \par \par Interval hx (21). Patient presents today POD#13 s/p BL SSM, Rt axillary dissection with immediate prepectoral TE reconstruction (filled to 100cc) with AlloDerm. Doing well with no new concerns. Denies any f/c or bleeding. Drains functional with decreasing output as expected. \par \par Interval hx (21): Pt presents today POD #17 s/p BL SSM, Rt axillary dissection with immediate prepectoral TE reconstruction (filled to 100cc) with AlloDerm. Feeling well after first TE fill. Remaining right drain output: . Denies f/c, redness, swelling, CP/SOB, N/V, or calf pain.\par \par Interval hx (21): Pt presents today POD #22 s/p BL SSM, Rt axillary dissection with immediate prepectoral TE reconstruction (filled to 100cc) with AlloDerm. Saw Dr. Macias  who discussed CTX x12 weeks and recommended RT consult given 3 positive LNs. \par \par Interval hx (21): Pt presents today POD #29 s/p BL SSM, Rt axillary dissection with immediate prepectoral TE reconstruction (filled to 100cc) with AlloDerm. Continues to do well with no significant discomfort. Had chemoport placed on Monday and scheduled to commence CTX later this week. Denies any f/c, bleeding or pressure after TE fills. \par \par Interval hx (21):  Here POD#41 s/p BL immediate prepec TE/Alloderm.  Started on chemotherapy, ; next cycle on .  Pt reports she will need RIGHT BREAST XRT.  Here for serial TE fill.   Denies fevers, chills, redness, swelling.\par \par Interval hx (21):  Here for POD#56 s/p BL prepec TE/Alloderm.  s/p 2 cycles of chemo. s/p chemoport.\par \par Interval hx (21):  Pt presents today as instructed by her oncologist for evaluation of left medial breast "bubble". Denies any pain, f/c, skin changes, open wounds or drainage. Tolerating chemotx with no significant complaints. \par \par Interval hx (21). Patient presents today for left TE overfill. Doing well with no new complaints. Tolerating chemotx with alopecia as expected, otherwise no significant adverse effects. \par \par Interval hx (21): Pt presents today for follow up. After [facility] planning, Dr. Shah advised to have left TE delfated for XRT field optimization for right XRT.  no new health issues; completed chemotherapy 3 weeks ago.  no fevers, chills, redness, swelling.

## 2021-11-12 NOTE — PHYSICAL EXAM
[de-identified] : well-appearing, NAD [de-identified] : Mild scoliosis\par Prominent right costal chest vs left, left upper chest with chemoport in place, c/d/i [de-identified] : Bilateral breasts soft, mastectomy flaps healthy, viable and well perfused with excellent cap refill, incisions healing well, c/d/i, no erythema or tissue ischemia, tissue expanders in good position, no fluid collection \par

## 2021-11-12 NOTE — PROCEDURE
[FreeTextEntry1] : s/p BL TE reconstruction  [FreeTextEntry2] : Tissue expander fill  [FreeTextEntry6] : The port location was identified and marked on the skin with the aid of the implant magnet. The skin was prepped in sterile fashion. Sterile saline was injected. The skin flaps remained well perfused, pink with good capillary refill <2 sec. The patient tolerated the procedure. \par \par Left mastectomy 250 g; Right mastectomy 435 g\par \par Right TE pre-expansion volume: 260 cc\par Total volume right TE after expansion : 310 cc (desired volume)\par \par Left TE pre-expansion volume: 340 cc (desired volume)\par Total volume left TE after deflation : 320 cc

## 2021-11-12 NOTE — ASSESSMENT
[FreeTextEntry1] : 45 yo F with right breast cancer s/p BL SSM, Rt axillary LN dissection (Dr. Macias) with immediate prepectoral TE reconstruction (filled to 100cc) with AlloDerm (6/15/21). Doing very well. \par \par Tolerating serial TE fill, total 310 cc b/l (desired volume)\par Doing well. \par \par - left TE deflation to 320 ml for right breast XRT\par - c/w sports bra\par - sleep supine\par - pt knows to call for fevers, chills, breast redness and swelling.\par - F/u 3 months for right breast TE re-expansion\par \par Due to COVID-19, pre-visit patient instructions were explained to the patient and their symptoms were checked upon arrival. Masks were used by the healthcare provider and staff and the examination room was cleaned after the patient visit concluded\par \par \par

## 2021-11-15 ENCOUNTER — APPOINTMENT (OUTPATIENT)
Dept: PLASTIC SURGERY | Facility: CLINIC | Age: 46
End: 2021-11-15
Payer: COMMERCIAL

## 2021-11-15 PROCEDURE — 99212 OFFICE O/P EST SF 10 MIN: CPT

## 2021-11-15 NOTE — ASSESSMENT
[FreeTextEntry1] : 45 yo F with right breast cancer s/p BL SSM, Rt axillary LN dissection (Dr. Macias) with immediate prepectoral TE reconstruction (filled to 100cc) with AlloDerm (6/15/21). Doing very well. \par \par Tolerating serial TE fill, total 310 cc b/l (desired volume)\par Doing well. \par \par - additional left TE deflation to 210 ml for right breast XRT\par - c/w sports bra\par - sleep supine\par - pt knows to call for fevers, chills, breast redness and swelling.\par - rx given for bra prosthesis left breast\par - F/u 3 months for right breast TE re-expansion\par \par Due to COVID-19, pre-visit patient instructions were explained to the patient and their symptoms were checked upon arrival. Masks were used by the healthcare provider and staff and the examination room was cleaned after the patient visit concluded\par \par \par

## 2021-11-15 NOTE — PHYSICAL EXAM
[de-identified] : well-appearing, NAD [de-identified] : Mild scoliosis\par Prominent right costal chest vs left, left upper chest with chemoport in place, c/d/i [de-identified] : Bilateral breasts soft, mastectomy flaps healthy, viable and well perfused with excellent cap refill, incisions healing well, c/d/i, no erythema or tissue ischemia, tissue expanders in good position, no fluid collection \par

## 2021-11-15 NOTE — HISTORY OF PRESENT ILLNESS
[FreeTextEntry1] : 46 yo   F with PMHx of anxiety/depression, HTN, GERD, asthma who was recently dx with RIGHT invasive ductal carcinoma and DCIS 2.4 cm @ 10:00 (ER+/SC+/HER2(-)) after palpable mass in right breast.  Denies breast pain, nipple discharge and retraction.  Here with friend, Rukhsana.\par \par Patient is learning toward bilateral mastectomy.  She is inquiring about immediate pre-pec implant based reconstruction.  She would like simplest recovery. 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  She would like a full B in reconstructive volume.\par Recent lost weight 8 lbs. currently 118 lbs.  Mother passed away recently 2 months ago.\par \par Interval hx (21). Patient presents today POD#8 s/p BL SSM, Rt axillary dissection with immediate prepectoral TE reconstruction (filled to 100cc) with AlloDerm. Doing very well with adequate pain management. Taking oral antibiotics as prescribed. Denies any f/c or bleeding. Drains all functional with appropriate output. Rt axillary drain with minimal 5-7 cc daily. \par \par Interval hx (21). Patient presents today POD#13 s/p BL SSM, Rt axillary dissection with immediate prepectoral TE reconstruction (filled to 100cc) with AlloDerm. Doing well with no new concerns. Denies any f/c or bleeding. Drains functional with decreasing output as expected. \par \par Interval hx (21): Pt presents today POD #17 s/p BL SSM, Rt axillary dissection with immediate prepectoral TE reconstruction (filled to 100cc) with AlloDerm. Feeling well after first TE fill. Remaining right drain output: . Denies f/c, redness, swelling, CP/SOB, N/V, or calf pain.\par \par Interval hx (21): Pt presents today POD #22 s/p BL SSM, Rt axillary dissection with immediate prepectoral TE reconstruction (filled to 100cc) with AlloDerm. Saw Dr. Macias  who discussed CTX x12 weeks and recommended RT consult given 3 positive LNs. \par \par Interval hx (21): Pt presents today POD #29 s/p BL SSM, Rt axillary dissection with immediate prepectoral TE reconstruction (filled to 100cc) with AlloDerm. Continues to do well with no significant discomfort. Had chemoport placed on Monday and scheduled to commence CTX later this week. Denies any f/c, bleeding or pressure after TE fills. \par \par Interval hx (21):  Here POD#41 s/p BL immediate prepec TE/Alloderm.  Started on chemotherapy, ; next cycle on .  Pt reports she will need RIGHT BREAST XRT.  Here for serial TE fill.   Denies fevers, chills, redness, swelling.\par \par Interval hx (21):  Here for POD#56 s/p BL prepec TE/Alloderm.  s/p 2 cycles of chemo. s/p chemoport.\par \par Interval hx (21):  Pt presents today as instructed by her oncologist for evaluation of left medial breast "bubble". Denies any pain, f/c, skin changes, open wounds or drainage. Tolerating chemotx with no significant complaints. \par \par Interval hx (21). Patient presents today for left TE overfill. Doing well with no new complaints. Tolerating chemotx with alopecia as expected, otherwise no significant adverse effects. \par \par Interval hx (21): Pt presents today for follow up. After [facility] planning, Dr. Shah advised to have left TE delfated for XRT field optimization for right XRT.  no new health issues; completed chemotherapy 3 weeks ago.  no fevers, chills, redness, swelling.\par \par Interval hx (11/15/21):  Went to radiation simulation today.  Left breast still within field and will require additional TE deflation for XRT.  No other complaints

## 2021-11-16 ENCOUNTER — APPOINTMENT (OUTPATIENT)
Dept: CARDIOLOGY | Facility: CLINIC | Age: 46
End: 2021-11-16
Payer: COMMERCIAL

## 2021-11-16 ENCOUNTER — NON-APPOINTMENT (OUTPATIENT)
Age: 46
End: 2021-11-16

## 2021-11-16 VITALS
TEMPERATURE: 97.3 F | DIASTOLIC BLOOD PRESSURE: 78 MMHG | HEIGHT: 65 IN | SYSTOLIC BLOOD PRESSURE: 122 MMHG | WEIGHT: 132 LBS | HEART RATE: 73 BPM | BODY MASS INDEX: 21.99 KG/M2

## 2021-11-16 VITALS — SYSTOLIC BLOOD PRESSURE: 114 MMHG | DIASTOLIC BLOOD PRESSURE: 80 MMHG

## 2021-11-16 DIAGNOSIS — Z86.010 PERSONAL HISTORY OF COLONIC POLYPS: ICD-10-CM

## 2021-11-16 PROCEDURE — 99214 OFFICE O/P EST MOD 30 MIN: CPT

## 2021-11-16 PROCEDURE — 77290 THER RAD SIMULAJ FIELD CPLX: CPT | Mod: 26

## 2021-11-16 PROCEDURE — 77334 RADIATION TREATMENT AID(S): CPT | Mod: 26

## 2021-11-16 PROCEDURE — 77263 THER RADIOLOGY TX PLNG CPLX: CPT

## 2021-11-16 PROCEDURE — 93000 ELECTROCARDIOGRAM COMPLETE: CPT

## 2021-11-16 NOTE — REVIEW OF SYSTEMS
[SOB] : no shortness of breath [Dyspnea on exertion] : not dyspnea during exertion [Chest Discomfort] : no chest discomfort

## 2021-11-16 NOTE — HISTORY OF PRESENT ILLNESS
[FreeTextEntry1] : The patient has right breast CA Stage II . The patient is going for chemo and RT . She will be receiving cardiotoxic medication  The patient has had no chest pain or SOB and she had done very wel lwith surgery . The patient had completed her chemotherapy 3 weeks ago and may go for RT soon . The patient has had periods of lightheadedness  .Said to have a normal HR at the time . BP has been high in other MD's office but was goood today and at home .

## 2021-11-16 NOTE — CARDIOLOGY SUMMARY
[de-identified] : 5- NSR Normal ECG . \par 11- NSR Normal ECG  [de-identified] : 6-9-2021 ETT Echo Completed 7 minutes and 31 seconds . No echo ischemia .abnrmal ECG response

## 2021-11-16 NOTE — ASSESSMENT
[FreeTextEntry1] : The patient has had her full chemo therapy . She is now going to go for RT followed by possible DASIA . The patient has had hig BP periodically but normal here and normal at home . She has had dizziness and her heart rate is never low . Echo was normal in August as well. The patient is on Metoprolol . She does have a chronic anemia

## 2021-11-18 ENCOUNTER — APPOINTMENT (OUTPATIENT)
Dept: BREAST CENTER | Facility: CLINIC | Age: 46
End: 2021-11-18
Payer: COMMERCIAL

## 2021-11-18 VITALS
SYSTOLIC BLOOD PRESSURE: 150 MMHG | WEIGHT: 132 LBS | BODY MASS INDEX: 21.99 KG/M2 | HEIGHT: 65 IN | TEMPERATURE: 97.5 F | DIASTOLIC BLOOD PRESSURE: 99 MMHG

## 2021-11-18 PROCEDURE — 93702 BIS XTRACELL FLUID ANALYSIS: CPT | Mod: NC

## 2021-11-18 PROCEDURE — 99213 OFFICE O/P EST LOW 20 MIN: CPT

## 2021-11-18 NOTE — REVIEW OF SYSTEMS
[Negative] : Constitutional [Skin Lesions] : no skin lesions [Breast Pain] : no breast pain [Breast Lump] : no breast lump [Breast Swelling] : no breast swelling [Breast Reddening] : no reddening of the breast

## 2021-11-18 NOTE — PHYSICAL EXAM
[Normocephalic] : normocephalic [Atraumatic] : atraumatic [No Supraclavicular Adenopathy] : no supraclavicular adenopathy [No Cervical Adenopathy] : no cervical adenopathy [Examined in the supine and seated position] : examined in the supine and seated position [No dominant masses] : no dominant masses in right breast  [No dominant masses] : no dominant masses left breast [No Axillary Lymphadenopathy] : no left axillary lymphadenopathy [No Edema] : no edema [No Rashes] : no rashes [No Ulceration] : no ulceration [de-identified] : well healed surgical scars.\par s/p B/L SSM w/ TE reconstruction; left TE has been recently deflated by Dr. Gallegos to plan for RT. [de-identified] : L-Dex Score - Right Arm: -4.4 (11/18/2021)

## 2021-11-18 NOTE — ASSESSMENT
[FreeTextEntry1] : Patient is 46 year old premenopausal woman with pathologic Stage IIB T2N1M0 ER/CO (+), HER2 (-) IDC of right breast. \par She is s/p BL SSM, SNB and Right AND with immediate prepectoral TE reconstruction (Dr. Gallegos) - 06/15/2021.\par \par Dr. Wolfgang Little - (+) chemo\par Completed Adriamycin / Cytoxan every 2 weeks x 4 followed by Taxol every 2 weeks x 4.\par Started on Zoladex for ovarian suppression. \par \par Dr. Elin Shah - currently in planning / sim phase for RT treatment; she had to have left TE deflated due to the fact that it was in the field of radiation. \par \par She has no currently breast related complaints.\par \par All of her questions and concerns were addressed.\par \par Plan: \par - Follow-up for breast exam in 6 months.\par - Continue follow-up with Medical Oncology.\par - Continue follow-up with Plastic Surgery as scheduled.\par - RT to begin in the near future.\par - Planning for BSO with Dr. Tati Suggs\par \par

## 2021-11-18 NOTE — HISTORY OF PRESENT ILLNESS
[FreeTextEntry1] : Brenda is a 46 year old premenopausal woman with pathologic Stage IIB T2N1M0 ER/OK (+), HER2 (-) IDC of right breast.\par \par s/p US Guided Core Bx - 04/29/2021: (stop-light)\par Right, 10:00 N4, 2.4cm:\par - Invasive poorly differentiated ductal carcinoma with scattered single cell necrosis.\par - Ductal carcinoma in-situ (DCIS), solid type with single cell necrosis, high nuclear grade.\par ER  (+)  95%\par OK  (+)  50%\par HER2  (-)  1.2\par Ki-67    30%\par \par Right, 10:00 N11, 0.4cm: (mini-cork)\par - Benign fibrofatty breast tissue and skeletal muscle with dominant macrocyst wall fragments associated with an attenuated epithelial lining and reactive wall fibrosis consistent with a dilated duct.\par \par \par HISTORICAL RISK FACTORS:\par (-) family hx - breast / ovarian cancer.\par PCP: Dr. Elena Yuan\par Genetic testing did not reveal any mutations. \par \par s/p BL SSM, SNB and Right AND with immediate prepectoral TE reconstruction (Dr. Gallegos) - 06/15/2021 = \par Right (3/25); healing prior bx site in the UOQ with invasive poorly differentiated ductal carcinoma, 3.5 cm, associated with focal necrosis. Non-extensive DCIS, high nuclear grade. Numerous foci of lymphovascular invasion are seen. Focal atypical lobular hyperplasia (ALH).\par Left (0/1); focal atypical lobular hyperplasia (ALH). Fibrofatty breast tissue with proliferative type fibrocystic\par changes.\par \par Dr. Wolfgang Little - (+) chemo\par Completed Adriamycin / Cytoxan every 2 weeks x 4 followed by Taxol every 2 weeks x 4.\par Started on Zoladex for ovarian suppression. \par \par Dr. Elin Shah - currently in planning / sim phase for RT treatment; she had to have left TE deflated due to the fact that it was in the field of radiation.

## 2021-11-19 PROCEDURE — 77295 3-D RADIOTHERAPY PLAN: CPT | Mod: 26

## 2021-11-19 PROCEDURE — 77300 RADIATION THERAPY DOSE PLAN: CPT | Mod: 26

## 2021-11-19 PROCEDURE — 77334 RADIATION TREATMENT AID(S): CPT | Mod: 26

## 2021-11-24 PROCEDURE — 77334 RADIATION TREATMENT AID(S): CPT | Mod: 26

## 2021-11-24 PROCEDURE — 77280 THER RAD SIMULAJ FIELD SMPL: CPT | Mod: 26

## 2021-11-26 NOTE — ASU PREOP CHECKLIST - TO WHOM
Spoke with patient.  Patient is unable to get to the clinic today.  She can not drive due to taking her pain medication. Patient denies any bruising or bleeding.     Spoke with patient via phone for follow up anticoagulation visit.   Last INR on 11/22/2021 was 2.9.  Dose resumed on 42 mg TWD.   Today's INR is 5.7 per POC  and is above goal range.  Patient is unable to drive or get a ride to the clinic for lab INR per protocol.   Current warfarin total weekly dose of 42 mg verified.  Informed the INR result is above therapeutic range and instructed to hold. Discussed dose and return date of 11/29/2021 for next INR. See Anticoagulation flowsheet.    Dr Mcallister is in the office today supervising the treatment.    Call your physician or seek medical care immediately if you notice any of the following symptoms of a bleed:   Red, dark, coffee or cola colored urine  Red or tar like stools  Excessive bleeding from gums or nose  Vomiting coffee colored or bright red material  Coughing up red tinged sputum  Severe or unprovoked pain (ex: severe Headache or Abdominal pain)  Sudden, spontaneous bruising for no reason  For female patients:  Excessive menstrual bleeding  A cut that will not stop bleeding within 10-15 mins  Symptoms associated with abnormal bleeding/high INR reviewed.    Encouraged to avoid activities that may result in a serious fall or injury and verbalizes understanding.    Instructed to contact the clinic with any unusual bleeding or bruising, any changes in medications, diet, health status, lifestyle, or any other changes, questions or concerns. Verbalized understanding of all discussed.      Erika Guzman OR RN

## 2021-11-29 ENCOUNTER — NON-APPOINTMENT (OUTPATIENT)
Age: 46
End: 2021-11-29

## 2021-11-29 VITALS
HEART RATE: 69 BPM | OXYGEN SATURATION: 100 % | TEMPERATURE: 96.6 F | RESPIRATION RATE: 12 BRPM | WEIGHT: 123.19 LBS | BODY MASS INDEX: 20.5 KG/M2 | SYSTOLIC BLOOD PRESSURE: 141 MMHG | DIASTOLIC BLOOD PRESSURE: 92 MMHG

## 2021-11-29 PROCEDURE — G6017: CPT

## 2021-11-29 NOTE — REVIEW OF SYSTEMS
[Nausea: Grade 0] : Nausea: Grade 0 [Vomiting: Grade 0] : Vomiting: Grade 0 [Fatigue: Grade 1 - Fatigue relieved by rest] : Fatigue: Grade 1 - Fatigue relieved by rest [Breast Pain: Grade 0] : Breast Pain: Grade 0 [Dizziness: Grade 0] : Dizziness: Grade 0  [Pruritus: Grade 0] : Pruritus: Grade 0 [Dermatitis Radiation: Grade 0] : Dermatitis Radiation: Grade 0

## 2021-11-29 NOTE — PHYSICAL EXAM
[Sclera] : the sclera and conjunctiva were normal [Hearing Threshold Finger Rub Not [place]] : hearing was normal [] : no respiratory distress [Respiration, Rhythm And Depth] : normal respiratory rhythm and effort [Exaggerated Use Of Accessory Muscles For Inspiration] : no accessory muscle use [Heart Rate And Rhythm] : heart rate and rhythm were normal [No Focal Deficits] : no focal deficits [Normal] : oriented to person, place and time, the affect was normal, the mood was normal and not anxious [de-identified] : No skin reaction

## 2021-11-29 NOTE — DISEASE MANAGEMENT
[Pathological] : TNM Stage: p [IIA] : IIA [FreeTextEntry4] : invasive ductal carcinoma of the right breast, upper outer quadrant, G3, ER /ND positive / HER 2 negative  [TTNM] : 2 [NTNM] : 1a [MTNM] : 0 [de-identified] : 200 [de-identified] : 1250 [de-identified] : right CW/Sclav

## 2021-11-29 NOTE — HISTORY OF PRESENT ILLNESS
[FreeTextEntry1] : Patient reports mild fatigue since chemotherapy.  She is scheduled for 24 hrs BP monitoring ordered by Dr. Bowers for her lightheadedness.  She has scheduled her hysterectomy for 1/24 with Dr. Suggs.  Otherwise, she has started moisturizing with both Aquaphor and Cerave. She has no new concerns.  She denies breast pain She is scheduled this week for Zoladex injection.

## 2021-11-30 ENCOUNTER — NON-APPOINTMENT (OUTPATIENT)
Age: 46
End: 2021-11-30

## 2021-11-30 ENCOUNTER — APPOINTMENT (OUTPATIENT)
Dept: INFUSION THERAPY | Facility: CLINIC | Age: 46
End: 2021-11-30

## 2021-11-30 ENCOUNTER — APPOINTMENT (OUTPATIENT)
Dept: HEMATOLOGY ONCOLOGY | Facility: CLINIC | Age: 46
End: 2021-11-30

## 2021-11-30 PROCEDURE — G6017: CPT

## 2021-12-01 PROCEDURE — G6017: CPT

## 2021-12-02 ENCOUNTER — APPOINTMENT (OUTPATIENT)
Dept: HEMATOLOGY ONCOLOGY | Facility: CLINIC | Age: 46
End: 2021-12-02
Payer: COMMERCIAL

## 2021-12-02 ENCOUNTER — LABORATORY RESULT (OUTPATIENT)
Age: 46
End: 2021-12-02

## 2021-12-02 ENCOUNTER — APPOINTMENT (OUTPATIENT)
Dept: INFUSION THERAPY | Facility: CLINIC | Age: 46
End: 2021-12-02
Payer: COMMERCIAL

## 2021-12-02 VITALS
WEIGHT: 132 LBS | HEART RATE: 75 BPM | BODY MASS INDEX: 21.99 KG/M2 | TEMPERATURE: 98.5 F | HEIGHT: 65 IN | SYSTOLIC BLOOD PRESSURE: 172 MMHG | DIASTOLIC BLOOD PRESSURE: 71 MMHG

## 2021-12-02 DIAGNOSIS — Z45.2 ENCOUNTER FOR ADJUSTMENT AND MANAGEMENT OF VASCULAR ACCESS DEVICE: ICD-10-CM

## 2021-12-02 DIAGNOSIS — Z79.818 LONG TERM (CURRENT) USE OF OTHER AGENTS AFFECTING ESTROGEN RECEPTORS AND ESTROGEN LEVELS: ICD-10-CM

## 2021-12-02 LAB
HCT VFR BLD CALC: 35.1 %
HGB BLD-MCNC: 11.3 G/DL
MCHC RBC-ENTMCNC: 30.4 PG
MCHC RBC-ENTMCNC: 32.2 G/DL
MCV RBC AUTO: 94.4 FL
PLATELET # BLD AUTO: 217 K/UL
PMV BLD: 9.5 FL
RBC # BLD: 3.72 M/UL
RBC # FLD: 17.3 %
WBC # FLD AUTO: 3.26 K/UL

## 2021-12-02 PROCEDURE — G6017: CPT

## 2021-12-02 PROCEDURE — 99215 OFFICE O/P EST HI 40 MIN: CPT

## 2021-12-02 RX ORDER — GOSERELIN ACETATE 10.8 MG/1
3.6 IMPLANT SUBCUTANEOUS ONCE
Refills: 0 | Status: COMPLETED | OUTPATIENT
Start: 2021-12-02 | End: 2021-12-02

## 2021-12-02 RX ADMIN — GOSERELIN ACETATE 3.6 MILLIGRAM(S): 10.8 IMPLANT SUBCUTANEOUS at 11:14

## 2021-12-02 NOTE — CONSULT LETTER
[Dear  ___] : Dear  [unfilled], [Consult Letter:] : I had the pleasure of evaluating your patient, [unfilled]. [Please see my note below.] : Please see my note below. [Consult Closing:] : Thank you very much for allowing me to participate in the care of this patient.  If you have any questions, please do not hesitate to contact me. [Sincerely,] : Sincerely, [DrMele  ___] : Dr. KIM [FreeTextEntry3] : Wolfgang Little MD\par Professor [employer]\par [facility]\par Attending Physician\par [facility] [place]\par [phone]\par \par

## 2021-12-02 NOTE — ASSESSMENT
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 \par Patient was seen by [facility].\par # Thyroid nodule: will be assessed with [facility] at a later date, pt will follow with PCP regarding this.\par # Gall bladder polyp : saw GI  already\par \par RECOMMENDATIONS:\par Pt has completed adjuvant chemotherapy with Adriamycin and Cytoxan followed by Taxol which she tokerated well.\par She has started adjuvant RT on 11/29/21 which is expected to be completed in 1st week of January.\par \par Pt is planning on getting PAUL/BSO in 2/21\par \par We also discussed need for endocrine therapy with OFS and AI after RT is completed.\par - Continue Zoladex  monthly\par Side effects of OFS were discussed including hot flashes, amenorrhea, bone loss, wt gain etc.\par \par We discussed adjuvant hormonal therapy and follow up for early stage breast cancer\par I recommended treatment with an aromatase inhibitor, Arimidex 1mg daily for at least 5 years likely longer.\par She will start it after RT is completed\par The side effects from such treatment were discussed in detail including but not limited to hot flashes, weight gain, hair thinning, arthralgia, myalgia, bone loss, increased risk of osteoporotic fractures and thrombo-embolism.\par A Baseline DEXA will be ordered and bone density monitored every 2 years. She will take Ca + VIt D daily while on AI.\par \par Pt was given instructions on holding AI in the periop period when she goes for PAUL/BSO.\par She can have the port removed after that surgery.\par \par \par May use melatonin for insomnia\par \par Labs and DEXA ordered.\par Pt advised on healthy life style and exercise\par All of her questions and concerns were addressed.\par \par Due to COVID 19, pre-visit patient instructions were explained to the patient and their symptoms were checked upon arrival. \par Masks were used by the health care providers and staff and the examination room was cleaned before and after the patient visit was completed.\par Pt has recd first Covid vaccine\par \par RTC in 8 weeks \par \par

## 2021-12-02 NOTE — HISTORY OF PRESENT ILLNESS
[de-identified] : This is a 45 premenopausal woman being seen for wt loss.\par Pt has Lost 15 lbs in the last 6 mths. \par She has a good appetite.\par C/o having drenching  night sweats more often.\par She is known to have enlarged  neck Lns, saw ENt however could not get FNA as the nodes were too small.\par Had recent change in bowel habits over the last month or so with constipation and diarrhea. She has a follow up with GI.\par Has heavy periods, has fibroids, will see GYN \par She has an enlarged LNs in the groin\par colonoscopy done in 12/2/19 showed 7mm ascending colon polyp.\par EGD non erosive gastritis [de-identified] : ONCOTYPE DX RS 32.\par RR at 7 yrs 25%.\par With chemo benefit from chemo 15% [de-identified] : 5/11/21\par Pt is here for follow up.\par She is here to discuss results and further plan of care.\par She has completed breast biopsy.\par She has surgery appt today.\par She also has a GI appt later in the week\par \par 6/28/21\par Pt is here for follow up.\par She is s/p B/L mastectomy with TE insertion.\par There were no post op complications.\par She still has a drain in place.\par She is here to discuss adjuvant therapy.\par Pt still is menstruating.\par \par 7/29/21\par pt is here for follow up.\par She is s/p 1 cycle of AC. She tolerated it well. No N, V, D, mouth sores, fever.\par Has gastric reflux.\par Had bleeding post Zoladex injection\par \par 8/12/2021\par Patient is here to follow up for breast cancer\par She is s/p cycle #2 Adriamycin/Cytoxan, tolerating well\par She is also on Zoladex\par She feels well today, appetite is good\par She denies nausea, vomiting, or any chest wall concerns\par She c/o of mild spasm after voiding even after completing Cipro x 5days\par \par 8/25/21\par Pt is here for follow up.\par Tolerating chemo with some SE. Nausea controlled with Zofran. Had UTI few weeks ago, treated with ABX, resolved.\par No menstrual bleeding since starting Zoladex.\par Will be seeing Dr Suggs.\par \par 9/9/21\par pt is here for follow up.\par She is feeling well.\par No urinary symptoms. denies fever, mouth sores, N, V, D\par \par 9/21/21\par Pt is here for follow up.\par She is feeling well. She offers no new complaints.\par No urinary symptoms. denies fever, mouth sores, N, V, D\par CBC today shows wbc=2.95, ANC=1.56, Hgb=9.5, Zuhx=691.\par \par 10/5/21\par Pt is here for follow up.\par C/O aches and pains since being on Taxol.\par No fever.\par \par 12/2/21\par Pt is here for follow up.\par C/O dizziness. It seems to be positional, improves with Meclizine.\par She saw her cardio and metoprolol was continued.\par Has mild neuropathy.\par Radiation has started this week.\par C/O insomnia\par \par

## 2021-12-02 NOTE — PHYSICAL EXAM
[Fully active, able to carry on all pre-disease performance without restriction] : Status 0 - Fully active, able to carry on all pre-disease performance without restriction [Normal] : affect appropriate [de-identified] : B/L mastectomy with implant reconstruction

## 2021-12-03 ENCOUNTER — NON-APPOINTMENT (OUTPATIENT)
Age: 46
End: 2021-12-03

## 2021-12-03 LAB
ALBUMIN SERPL ELPH-MCNC: 4.9 G/DL
ALP BLD-CCNC: 77 U/L
ALT SERPL-CCNC: 11 U/L
ANION GAP SERPL CALC-SCNC: 17 MMOL/L
AST SERPL-CCNC: 16 U/L
BILIRUB SERPL-MCNC: <0.2 MG/DL
BUN SERPL-MCNC: 20 MG/DL
CALCIUM SERPL-MCNC: 9.4 MG/DL
CHLORIDE SERPL-SCNC: 106 MMOL/L
CO2 SERPL-SCNC: 20 MMOL/L
CREAT SERPL-MCNC: 0.7 MG/DL
GLUCOSE SERPL-MCNC: 90 MG/DL
POTASSIUM SERPL-SCNC: 4.3 MMOL/L
PROT SERPL-MCNC: 7.3 G/DL
SODIUM SERPL-SCNC: 143 MMOL/L

## 2021-12-03 PROCEDURE — 77427 RADIATION TX MANAGEMENT X5: CPT

## 2021-12-06 ENCOUNTER — NON-APPOINTMENT (OUTPATIENT)
Age: 46
End: 2021-12-06

## 2021-12-06 VITALS
WEIGHT: 131.13 LBS | SYSTOLIC BLOOD PRESSURE: 133 MMHG | RESPIRATION RATE: 16 BRPM | DIASTOLIC BLOOD PRESSURE: 82 MMHG | OXYGEN SATURATION: 100 % | BODY MASS INDEX: 21.82 KG/M2 | HEART RATE: 76 BPM | TEMPERATURE: 96.1 F

## 2021-12-06 PROCEDURE — G6017: CPT

## 2021-12-06 RX ORDER — UBIDECARENONE/VIT E ACET 100MG-5
CAPSULE ORAL
Refills: 0 | Status: ACTIVE | COMMUNITY

## 2021-12-06 NOTE — REVIEW OF SYSTEMS
[Fatigue: Grade 1 - Fatigue relieved by rest] : Fatigue: Grade 1 - Fatigue relieved by rest [Breast Pain: Grade 0] : Breast Pain: Grade 0 [Pruritus: Grade 0] : Pruritus: Grade 0 [Dermatitis Radiation: Grade 0] : Dermatitis Radiation: Grade 0

## 2021-12-06 NOTE — HISTORY OF PRESENT ILLNESS
[FreeTextEntry1] : No skin reaction noted. Applying Cerave after treatment and Aquaphor at night. Denies pain or discomfort. Scheduled for 24hr BP monitoring for dizziness tomorrow.  \par

## 2021-12-06 NOTE — PHYSICAL EXAM
[Sclera] : the sclera and conjunctiva were normal [Hearing Threshold Finger Rub Not [place]] : hearing was normal [] : no respiratory distress [Exaggerated Use Of Accessory Muscles For Inspiration] : no accessory muscle use [Normal] : oriented to person, place and time, the affect was normal, the mood was normal and not anxious [de-identified] : No skin reaction

## 2021-12-06 NOTE — DISEASE MANAGEMENT
[Pathological] : TNM Stage: p [IIA] : IIA [FreeTextEntry4] : invasive ductal carcinoma of the right breast, upper outer quadrant, G3, ER /GA positive / HER 2 negative  [TTNM] : 2 [NTNM] : 1a [MTNM] : 0 [de-identified] : 1200cGy [de-identified] : 5000cGy [de-identified] : right CW/Sclav

## 2021-12-07 ENCOUNTER — NON-APPOINTMENT (OUTPATIENT)
Age: 46
End: 2021-12-07

## 2021-12-07 ENCOUNTER — APPOINTMENT (OUTPATIENT)
Dept: CARDIOLOGY | Facility: CLINIC | Age: 46
End: 2021-12-07
Payer: COMMERCIAL

## 2021-12-07 PROCEDURE — G6017: CPT

## 2021-12-07 PROCEDURE — ZZZZZ: CPT

## 2021-12-08 ENCOUNTER — APPOINTMENT (OUTPATIENT)
Dept: CARDIOLOGY | Facility: CLINIC | Age: 46
End: 2021-12-08
Payer: COMMERCIAL

## 2021-12-08 PROCEDURE — G6017: CPT

## 2021-12-08 PROCEDURE — 93784 AMBL BP MNTR W/SOFTWARE: CPT

## 2021-12-09 ENCOUNTER — NON-APPOINTMENT (OUTPATIENT)
Age: 46
End: 2021-12-09

## 2021-12-09 PROCEDURE — G6017: CPT

## 2021-12-10 PROCEDURE — 77427 RADIATION TX MANAGEMENT X5: CPT

## 2021-12-10 PROCEDURE — G6017: CPT

## 2021-12-13 ENCOUNTER — OUTPATIENT (OUTPATIENT)
Dept: OUTPATIENT SERVICES | Facility: HOSPITAL | Age: 46
LOS: 1 days | Discharge: HOME | End: 2021-12-13

## 2021-12-13 ENCOUNTER — NON-APPOINTMENT (OUTPATIENT)
Age: 46
End: 2021-12-13

## 2021-12-13 VITALS
OXYGEN SATURATION: 99 % | WEIGHT: 130.25 LBS | RESPIRATION RATE: 12 BRPM | HEART RATE: 81 BPM | DIASTOLIC BLOOD PRESSURE: 87 MMHG | TEMPERATURE: 96.2 F | BODY MASS INDEX: 21.67 KG/M2 | SYSTOLIC BLOOD PRESSURE: 125 MMHG

## 2021-12-13 DIAGNOSIS — Z98.890 OTHER SPECIFIED POSTPROCEDURAL STATES: Chronic | ICD-10-CM

## 2021-12-13 DIAGNOSIS — Z90.13 ACQUIRED ABSENCE OF BILATERAL BREASTS AND NIPPLES: Chronic | ICD-10-CM

## 2021-12-13 DIAGNOSIS — Z90.49 ACQUIRED ABSENCE OF OTHER SPECIFIED PARTS OF DIGESTIVE TRACT: Chronic | ICD-10-CM

## 2021-12-13 DIAGNOSIS — M67.40 GANGLION, UNSPECIFIED SITE: Chronic | ICD-10-CM

## 2021-12-13 DIAGNOSIS — R59.0 LOCALIZED ENLARGED LYMPH NODES: ICD-10-CM

## 2021-12-13 PROCEDURE — G6017: CPT

## 2021-12-13 RX ORDER — OMEGA-3/DHA/EPA/FISH OIL 300-1000MG
CAPSULE ORAL
Refills: 0 | Status: ACTIVE | COMMUNITY

## 2021-12-13 NOTE — DISEASE MANAGEMENT
[Pathological] : TNM Stage: p [IIA] : IIA [FreeTextEntry4] : invasive ductal carcinoma of the right breast, upper outer quadrant, G3, ER /IN positive / HER 2 negative  [TTNM] : 2 [NTNM] : 1a [MTNM] : 0 [de-identified] : 2200cGy [de-identified] : 5000cGy [de-identified] : right CW/Sclav

## 2021-12-13 NOTE — PHYSICAL EXAM
[Sclera] : the sclera and conjunctiva were normal [Hearing Threshold Finger Rub Not [place]] : hearing was normal [Exaggerated Use Of Accessory Muscles For Inspiration] : no accessory muscle use [] : no respiratory distress [Heart Rate And Rhythm] : heart rate and rhythm were normal [Nondistended] : nondistended [No Focal Deficits] : no focal deficits [Normal] : oriented to person, place and time, the affect was normal, the mood was normal and not anxious [de-identified] : Mild erythema in radiation field.  No desquamation.

## 2021-12-13 NOTE — REVIEW OF SYSTEMS
[Constipation: Grade 0] : Constipation: Grade 0 [Diarrhea: Grade 1 - Increase of <4 stools per day over baseline; mild increase in ostomy output compared to baseline] : Diarrhea: Grade 1 - Increase of <4 stools per day over baseline; mild increase in ostomy output compared to baseline [Nausea: Grade 0] : Nausea: Grade 0 [Vomiting: Grade 0] : Vomiting: Grade 0 [Fatigue: Grade 0] : Fatigue: Grade 0 [Breast Pain: Grade 0] : Breast Pain: Grade 0 [Insomnia: Grade 1 - Mild difficulty falling asleep, staying asleep or waking up early] : Insomnia: Grade 1 - Mild difficulty falling asleep, staying asleep or waking up early [Dyspnea: Grade 0] : Dyspnea: Grade 0 [Pruritus: Grade 0] : Pruritus: Grade 0 [Dermatitis Radiation: Grade 1 - Faint erythema or dry desquamation] : Dermatitis Radiation: Grade 1 - Faint erythema or dry desquamation

## 2021-12-13 NOTE — HISTORY OF PRESENT ILLNESS
[FreeTextEntry1] : Patient reports doing well.  However, she is having mild diarrhea 2-5X/day X 4 days.  She thinks if may be from her new HTN med (Norvasc) and Magnesium Oxide recently started by her Cardiologist.  Also, difficulty staying asleep, taking melatonin without effect.  She feels it is most likely hormonal.   Otherwise, she denies breast pain and fatigue.  She  continues to moisturizes and does ROM exercises.

## 2021-12-14 DIAGNOSIS — M89.9 DISORDER OF BONE, UNSPECIFIED: ICD-10-CM

## 2021-12-14 DIAGNOSIS — Z78.0 ASYMPTOMATIC MENOPAUSAL STATE: ICD-10-CM

## 2021-12-14 DIAGNOSIS — Z13.820 ENCOUNTER FOR SCREENING FOR OSTEOPOROSIS: ICD-10-CM

## 2021-12-14 PROCEDURE — G6017: CPT

## 2021-12-15 PROCEDURE — G6017: CPT

## 2021-12-16 PROCEDURE — G6017: CPT

## 2021-12-17 PROCEDURE — 77427 RADIATION TX MANAGEMENT X5: CPT

## 2021-12-17 PROCEDURE — G6017: CPT

## 2021-12-20 ENCOUNTER — NON-APPOINTMENT (OUTPATIENT)
Age: 46
End: 2021-12-20

## 2021-12-20 VITALS
DIASTOLIC BLOOD PRESSURE: 78 MMHG | BODY MASS INDEX: 22.47 KG/M2 | RESPIRATION RATE: 12 BRPM | SYSTOLIC BLOOD PRESSURE: 119 MMHG | HEART RATE: 77 BPM | OXYGEN SATURATION: 100 % | TEMPERATURE: 97.7 F | WEIGHT: 135 LBS

## 2021-12-20 PROCEDURE — G6017: CPT

## 2021-12-20 NOTE — REVIEW OF SYSTEMS
[Diarrhea: Grade 0] : Diarrhea: Grade 0 [Fatigue: Grade 1 - Fatigue relieved by rest] : Fatigue: Grade 1 - Fatigue relieved by rest [Breast Pain: Grade 0] : Breast Pain: Grade 0 [Dyspnea: Grade 0] : Dyspnea: Grade 0 [Pruritus: Grade 0] : Pruritus: Grade 0 [Dermatitis Radiation: Grade 2 - Moderate to brisk erythema; patchy moist desquamation, mostly confined to skin folds and creases; moderate edema] : Dermatitis Radiation: Grade 2 - Moderate to brisk erythema; patchy moist desquamation, mostly confined to skin folds and creases; moderate edema

## 2021-12-21 PROCEDURE — G6017: CPT

## 2021-12-22 PROCEDURE — G6017: CPT

## 2021-12-22 NOTE — HISTORY OF PRESENT ILLNESS
[FreeTextEntry1] : Patient reports doing well.  She had a fun and active weekend.  Therefore, feeling mild fatigue.  Her diarrhea has resolved.  She continues to moisturize.  Denies pain and has no new concerns.

## 2021-12-22 NOTE — DISEASE MANAGEMENT
[Pathological] : TNM Stage: p [IIA] : IIA [FreeTextEntry4] : invasive ductal carcinoma of the right breast, upper outer quadrant, G3, ER /OK positive / HER 2 negative  [TTNM] : 2 [NTNM] : 1a [MTNM] : 0 [de-identified] : 4068cGy [de-identified] : 5000cGy [de-identified] : right CW/Sclav

## 2021-12-22 NOTE — PHYSICAL EXAM
[Sclera] : the sclera and conjunctiva were normal [Hearing Threshold Finger Rub Not [place]] : hearing was normal [] : no respiratory distress [Respiration, Rhythm And Depth] : normal respiratory rhythm and effort [Exaggerated Use Of Accessory Muscles For Inspiration] : no accessory muscle use [Heart Rate And Rhythm] : heart rate and rhythm were normal [Nondistended] : nondistended [Skin Color & Pigmentation] : normal skin color and pigmentation [No Focal Deficits] : no focal deficits [Normal] : oriented to person, place and time, the affect was normal, the mood was normal and not anxious [de-identified] : mild-mod erythema.  No desquamation

## 2021-12-23 PROCEDURE — G6017: CPT

## 2021-12-27 ENCOUNTER — NON-APPOINTMENT (OUTPATIENT)
Age: 46
End: 2021-12-27

## 2021-12-27 VITALS
BODY MASS INDEX: 21.49 KG/M2 | DIASTOLIC BLOOD PRESSURE: 73 MMHG | SYSTOLIC BLOOD PRESSURE: 110 MMHG | TEMPERATURE: 97.6 F | HEART RATE: 82 BPM | WEIGHT: 129.13 LBS | RESPIRATION RATE: 12 BRPM

## 2021-12-27 PROCEDURE — 77427 RADIATION TX MANAGEMENT X5: CPT

## 2021-12-28 PROCEDURE — G6017: CPT

## 2021-12-28 NOTE — HISTORY OF PRESENT ILLNESS
[FreeTextEntry1] : Patient reports doing well.  She moisturizes 3X/day, applies cortisone to Sclav  as needed.  She denies burning sensation,  and pain.  Otherwise, she has no new complaints.  She is scheduled for her Zoladex injection this week and continues feeling fatigue at times.

## 2021-12-28 NOTE — DISEASE MANAGEMENT
[Pathological] : TNM Stage: p [IIA] : IIA [FreeTextEntry4] : invasive ductal carcinoma of the right breast, upper outer quadrant, G3, ER /RI positive / HER 2 negative  [TTNM] : 2 [NTNM] : 1a [MTNM] : 0 [de-identified] : 4000cGy [de-identified] : 5000cGy [de-identified] : right CW/Sclav

## 2021-12-28 NOTE — PHYSICAL EXAM
[Sclera] : the sclera and conjunctiva were normal [Hearing Threshold Finger Rub Not [place]] : hearing was normal [] : no respiratory distress [Respiration, Rhythm And Depth] : normal respiratory rhythm and effort [Exaggerated Use Of Accessory Muscles For Inspiration] : no accessory muscle use [Heart Rate And Rhythm] : heart rate and rhythm were normal [Nondistended] : nondistended [Skin Color & Pigmentation] : normal skin color and pigmentation [Normal] : oriented to person, place and time, the affect was normal, the mood was normal and not anxious [de-identified] : Brisk erythema at this point, especially over the clavicle, however skin intact.

## 2021-12-29 PROCEDURE — G6017: CPT

## 2021-12-30 ENCOUNTER — OUTPATIENT (OUTPATIENT)
Dept: OUTPATIENT SERVICES | Facility: HOSPITAL | Age: 46
LOS: 1 days | Discharge: HOME | End: 2021-12-30

## 2021-12-30 ENCOUNTER — APPOINTMENT (OUTPATIENT)
Dept: CARDIOLOGY | Facility: CLINIC | Age: 46
End: 2021-12-30
Payer: SELF-PAY

## 2021-12-30 ENCOUNTER — APPOINTMENT (OUTPATIENT)
Dept: INFUSION THERAPY | Facility: CLINIC | Age: 46
End: 2021-12-30

## 2021-12-30 VITALS
SYSTOLIC BLOOD PRESSURE: 120 MMHG | HEART RATE: 67 BPM | WEIGHT: 130 LBS | TEMPERATURE: 97.3 F | HEIGHT: 65 IN | BODY MASS INDEX: 21.66 KG/M2 | DIASTOLIC BLOOD PRESSURE: 80 MMHG

## 2021-12-30 DIAGNOSIS — M67.40 GANGLION, UNSPECIFIED SITE: Chronic | ICD-10-CM

## 2021-12-30 DIAGNOSIS — C50.411 MALIGNANT NEOPLASM OF UPPER-OUTER QUADRANT OF RIGHT FEMALE BREAST: ICD-10-CM

## 2021-12-30 DIAGNOSIS — Z98.890 OTHER SPECIFIED POSTPROCEDURAL STATES: Chronic | ICD-10-CM

## 2021-12-30 DIAGNOSIS — Z90.49 ACQUIRED ABSENCE OF OTHER SPECIFIED PARTS OF DIGESTIVE TRACT: Chronic | ICD-10-CM

## 2021-12-30 DIAGNOSIS — Z01.818 ENCOUNTER FOR OTHER PREPROCEDURAL EXAMINATION: ICD-10-CM

## 2021-12-30 DIAGNOSIS — Z90.13 ACQUIRED ABSENCE OF BILATERAL BREASTS AND NIPPLES: Chronic | ICD-10-CM

## 2021-12-30 DIAGNOSIS — Z45.2 ENCOUNTER FOR ADJUSTMENT AND MANAGEMENT OF VASCULAR ACCESS DEVICE: ICD-10-CM

## 2021-12-30 DIAGNOSIS — Z51.11 ENCOUNTER FOR ANTINEOPLASTIC CHEMOTHERAPY: ICD-10-CM

## 2021-12-30 DIAGNOSIS — C50.211 MALIGNANT NEOPLASM OF UPPER-INNER QUADRANT OF RIGHT FEMALE BREAST: ICD-10-CM

## 2021-12-30 DIAGNOSIS — C50.919 MALIGNANT NEOPLASM OF UNSPECIFIED SITE OF UNSPECIFIED FEMALE BREAST: ICD-10-CM

## 2021-12-30 PROCEDURE — G6017: CPT

## 2021-12-30 PROCEDURE — 93000 ELECTROCARDIOGRAM COMPLETE: CPT

## 2021-12-30 PROCEDURE — 99214 OFFICE O/P EST MOD 30 MIN: CPT

## 2021-12-30 RX ORDER — GOSERELIN ACETATE 10.8 MG/1
3.6 IMPLANT SUBCUTANEOUS ONCE
Refills: 0 | Status: COMPLETED | OUTPATIENT
Start: 2021-12-30 | End: 2021-12-30

## 2021-12-30 RX ADMIN — GOSERELIN ACETATE 3.6 MILLIGRAM(S): 10.8 IMPLANT SUBCUTANEOUS at 15:43

## 2021-12-30 NOTE — CARDIOLOGY SUMMARY
[de-identified] : 5- NSR Normal ECG . \par 11- NSR Normal ECG  [de-identified] : 12-7-2021 24 hour ambulatory BP monitor . Mild systolic HTN significant diastolic hypertension . <10% night time dip  [de-identified] : 6-9-2021 ETT Echo Completed 7 minutes and 31 seconds . No echo ischemia .abnrmal ECG response

## 2021-12-30 NOTE — HISTORY OF PRESENT ILLNESS
[FreeTextEntry1] : The patient has right breast CA Stage II . The patient  finished chemo and she is finishing RT . She is scheduled to have TAHBSO in one month . BP was high and Amlodipine was added .

## 2021-12-30 NOTE — ASSESSMENT
[FreeTextEntry1] : The patient has Stage II breast CA . She is going for TAHBSO . She is finishing RT . The patient needs to go for TAHBSO . The patient had increased in her BP and this was documented on 24 hour ambulatory BP monitor . She as started on Norvasc with improvement in her BP which is now stable. The patient has more than 4 METS exercise tolerance. No acute changes on ECG and no ischemia on echo protion of her stress echo at moderate exercise load. The patient is an intermediate cardiac risk undergoing an intermediate risk procedure.

## 2022-01-03 ENCOUNTER — NON-APPOINTMENT (OUTPATIENT)
Age: 47
End: 2022-01-03

## 2022-01-03 VITALS
RESPIRATION RATE: 12 BRPM | OXYGEN SATURATION: 99 % | HEART RATE: 83 BPM | WEIGHT: 131.13 LBS | SYSTOLIC BLOOD PRESSURE: 112 MMHG | DIASTOLIC BLOOD PRESSURE: 74 MMHG | BODY MASS INDEX: 21.82 KG/M2

## 2022-01-03 DIAGNOSIS — R53.83 OTHER FATIGUE: ICD-10-CM

## 2022-01-03 PROCEDURE — G6017: CPT

## 2022-01-03 NOTE — DISEASE MANAGEMENT
[Pathological] : TNM Stage: p [IIA] : IIA [FreeTextEntry4] : invasive ductal carcinoma of the right breast, upper outer quadrant, G3, ER /DE positive / HER 2 negative  [TTNM] : 2 [NTNM] : 1a [MTNM] : 0 [de-identified] : 9620cGy [de-identified] : 5000cGy [de-identified] : right CW/Sclav

## 2022-01-03 NOTE — PHYSICAL EXAM
[Sclera] : the sclera and conjunctiva were normal [Hearing Threshold Finger Rub Not [place]] : hearing was normal [] : no respiratory distress [Respiration, Rhythm And Depth] : normal respiratory rhythm and effort [Exaggerated Use Of Accessory Muscles For Inspiration] : no accessory muscle use [Heart Rate And Rhythm] : heart rate and rhythm were normal [Normal] : oriented to person, place and time, the affect was normal, the mood was normal and not anxious [de-identified] : Moderate erythema in radiation field. No desquamation

## 2022-01-03 NOTE — HISTORY OF PRESENT ILLNESS
[FreeTextEntry1] : Patient reports doing well.  However, feeling more fatigued.  She continues to moisturize well, applies cortisone as needed for mild pruritus.  Otherwise, no new concerns.  She will be completing XRT tomorrow.  We discussed d/c instructions and f/u apts.  She is scheduled for TAHBSO on 1/24/2022.  She will discuss removal of medi-port with Dr. Little.  She was prescribed Anastrozole.

## 2022-01-03 NOTE — REVIEW OF SYSTEMS
[Fatigue: Grade 1 - Fatigue relieved by rest] : Fatigue: Grade 1 - Fatigue relieved by rest [Breast Pain: Grade 0] : Breast Pain: Grade 0 [Cough: Grade 0] : Cough: Grade 0 [Dyspnea: Grade 0] : Dyspnea: Grade 0 [Pruritus: Grade 1 - Mild or localized; topical intervention indicated] : Pruritus: Grade 1 - Mild or localized; topical intervention indicated [Skin Hyperpigmentation: Grade 1 - Hyperpigmentation covering <10% BSA; no psychosocial impact] : Skin Hyperpigmentation: Grade 1 - Hyperpigmentation covering <10% BSA; no psychosocial impact [Dermatitis Radiation: Grade 2 - Moderate to brisk erythema; patchy moist desquamation, mostly confined to skin folds and creases; moderate edema] : Dermatitis Radiation: Grade 2 - Moderate to brisk erythema; patchy moist desquamation, mostly confined to skin folds and creases; moderate edema

## 2022-01-04 ENCOUNTER — OUTPATIENT (OUTPATIENT)
Dept: OUTPATIENT SERVICES | Facility: HOSPITAL | Age: 47
LOS: 1 days | Discharge: HOME | End: 2022-01-04
Payer: COMMERCIAL

## 2022-01-04 DIAGNOSIS — C50.411 MALIGNANT NEOPLASM OF UPPER-OUTER QUADRANT OF RIGHT FEMALE BREAST: ICD-10-CM

## 2022-01-04 DIAGNOSIS — Z90.49 ACQUIRED ABSENCE OF OTHER SPECIFIED PARTS OF DIGESTIVE TRACT: Chronic | ICD-10-CM

## 2022-01-04 DIAGNOSIS — Z98.890 OTHER SPECIFIED POSTPROCEDURAL STATES: Chronic | ICD-10-CM

## 2022-01-04 DIAGNOSIS — M67.40 GANGLION, UNSPECIFIED SITE: Chronic | ICD-10-CM

## 2022-01-04 DIAGNOSIS — Z90.13 ACQUIRED ABSENCE OF BILATERAL BREASTS AND NIPPLES: Chronic | ICD-10-CM

## 2022-01-04 PROCEDURE — 77427 RADIATION TX MANAGEMENT X5: CPT

## 2022-01-04 PROCEDURE — G6017: CPT

## 2022-01-05 ENCOUNTER — APPOINTMENT (OUTPATIENT)
Dept: PLASTIC SURGERY | Facility: CLINIC | Age: 47
End: 2022-01-05
Payer: COMMERCIAL

## 2022-01-05 PROCEDURE — 99213 OFFICE O/P EST LOW 20 MIN: CPT

## 2022-01-05 NOTE — HISTORY OF PRESENT ILLNESS
[FreeTextEntry1] : 46 yo   F with PMHx of anxiety/depression, HTN, GERD, asthma who was recently dx with RIGHT invasive ductal carcinoma and DCIS 2.4 cm @ 10:00 (ER+/SD+/HER2(-)) after palpable mass in right breast.  Denies breast pain, nipple discharge and retraction.  Here with friend, Rukhsana.\par \par Patient is learning toward bilateral mastectomy.  She is inquiring about immediate pre-pec implant based reconstruction.  She would like simplest recovery. 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 lives alone (2 cats); post-op assistance from friends\par \par Wears 34B, cup not filled.  She would like a full B in reconstructive volume.\par Recent lost weight 8 lbs. currently 118 lbs.  Mother passed away recently 2 months ago.\par \par Interval hx (21). Patient presents today POD#8 s/p BL SSM, Rt axillary dissection with immediate prepectoral TE reconstruction (filled to 100cc) with AlloDerm. Doing very well with adequate pain management. Taking oral antibiotics as prescribed. Denies any f/c or bleeding. Drains all functional with appropriate output. Rt axillary drain with minimal 5-7 cc daily. \par \par Interval hx (21). Patient presents today POD#13 s/p BL SSM, Rt axillary dissection with immediate prepectoral TE reconstruction (filled to 100cc) with AlloDerm. Doing well with no new concerns. Denies any f/c or bleeding. Drains functional with decreasing output as expected. \par \par Interval hx (21): Pt presents today POD #17 s/p BL SSM, Rt axillary dissection with immediate prepectoral TE reconstruction (filled to 100cc) with AlloDerm. Feeling well after first TE fill. Remaining right drain output: . Denies f/c, redness, swelling, CP/SOB, N/V, or calf pain.\par \par Interval hx (21): Pt presents today POD #22 s/p BL SSM, Rt axillary dissection with immediate prepectoral TE reconstruction (filled to 100cc) with AlloDerm. Saw Dr. Macias  who discussed CTX x12 weeks and recommended RT consult given 3 positive LNs. \par \par Interval hx (21): Pt presents today POD #29 s/p BL SSM, Rt axillary dissection with immediate prepectoral TE reconstruction (filled to 100cc) with AlloDerm. Continues to do well with no significant discomfort. Had chemoport placed on Monday and scheduled to commence CTX later this week. Denies any f/c, bleeding or pressure after TE fills. \par \par Interval hx (21):  Here POD#41 s/p BL immediate prepec TE/Alloderm.  Started on chemotherapy, ; next cycle on .  Pt reports she will need RIGHT BREAST XRT.  Here for serial TE fill.   Denies fevers, chills, redness, swelling.\par \par Interval hx (21):  Here for POD#56 s/p BL prepec TE/Alloderm.  s/p 2 cycles of chemo. s/p chemoport.\par \par Interval hx (21):  Pt presents today as instructed by her oncologist for evaluation of left medial breast "bubble". Denies any pain, f/c, skin changes, open wounds or drainage. Tolerating chemotx with no significant complaints. \par \par Interval hx (21). Patient presents today for left TE overfill. Doing well with no new complaints. Tolerating chemotx with alopecia as expected, otherwise no significant adverse effects. \par \par Interval hx (21): Pt presents today for follow up. After [facility] planning, Dr. Shah advised to have left TE delfated for XRT field optimization for right XRT.  no new health issues; completed chemotherapy 3 weeks ago.  no fevers, chills, redness, swelling.\par \par Interval hx (11/15/21):  Went to radiation simulation today.  Left breast still within field and will require additional TE deflation for XRT.  No other complaints\par \par Interval hx (22): Pt presents today to refill left TE as she has completed right breast XRT as of 22. Tolerated XRT very well and applying moisturizer 3x/day.

## 2022-01-05 NOTE — ASSESSMENT
[FreeTextEntry1] : 45 yo F with right breast cancer s/p BL SSM, Rt axillary LN dissection (Dr. Macias) with immediate prepectoral TE reconstruction (filled to 100cc) with AlloDerm (6/15/21). Doing very well. \par \par Tolerating serial TE fill\par Right total 310 cc (desired volume for bilateral breasts)\par Left breast previously at 340cc, then deflated to 210cc as left TE was in the right breast XRT field\par Doing well. \par \par - left TE refill started today: 60cc placed for new total of 270cc\par - c/w sports bra\par - sleep supine\par - pt knows to call for fevers, chills, breast redness and swelling.\par - F/u 1 week to complete left TE re-expansion\par \par Due to COVID-19, pre-visit patient instructions were explained to the patient and their symptoms were checked upon arrival. Masks were used by the healthcare provider and staff and the examination room was cleaned after the patient visit concluded\par \par \par

## 2022-01-05 NOTE — PROCEDURE
[FreeTextEntry1] : s/p BL TE reconstruction  [FreeTextEntry2] : Tissue expander fill  [FreeTextEntry6] : The port location was identified and marked on the skin with the aid of the implant magnet. The skin was prepped in sterile fashion. Sterile saline was injected. The skin flaps remained well perfused, pink with good capillary refill <2 sec. The patient tolerated the procedure. \par \par Left mastectomy 250 g; Right mastectomy 435 g\par \par Right TE pre-expansion volume: 260 cc\par Total volume right TE after expansion : 310 cc (desired volume)\par \par Left TE pre-expansion volume: 210 cc\par Total volume left TE after deflation : 270 cc

## 2022-01-05 NOTE — PHYSICAL EXAM
[de-identified] : well-appearing, NAD [de-identified] : Mild scoliosis\par Prominent right costal chest vs left, left upper chest with chemoport in place, c/d/i [de-identified] : Bilateral breasts soft, mastectomy flaps healthy, viable and well perfused with excellent cap refill, incisions healing well, c/d/i, no erythema or tissue ischemia, tissue expanders in good position, no fluid collection \par

## 2022-01-12 ENCOUNTER — OUTPATIENT (OUTPATIENT)
Dept: OUTPATIENT SERVICES | Facility: HOSPITAL | Age: 47
LOS: 1 days | Discharge: HOME | End: 2022-01-12

## 2022-01-12 VITALS
SYSTOLIC BLOOD PRESSURE: 136 MMHG | HEIGHT: 66 IN | DIASTOLIC BLOOD PRESSURE: 83 MMHG | RESPIRATION RATE: 18 BRPM | HEART RATE: 68 BPM | WEIGHT: 126.99 LBS | OXYGEN SATURATION: 96 % | TEMPERATURE: 99 F

## 2022-01-12 DIAGNOSIS — Z01.818 ENCOUNTER FOR OTHER PREPROCEDURAL EXAMINATION: ICD-10-CM

## 2022-01-12 DIAGNOSIS — C50.911 MALIGNANT NEOPLASM OF UNSPECIFIED SITE OF RIGHT FEMALE BREAST: ICD-10-CM

## 2022-01-12 DIAGNOSIS — Z90.13 ACQUIRED ABSENCE OF BILATERAL BREASTS AND NIPPLES: Chronic | ICD-10-CM

## 2022-01-12 DIAGNOSIS — Z98.890 OTHER SPECIFIED POSTPROCEDURAL STATES: Chronic | ICD-10-CM

## 2022-01-12 DIAGNOSIS — M67.40 GANGLION, UNSPECIFIED SITE: Chronic | ICD-10-CM

## 2022-01-12 DIAGNOSIS — Z90.49 ACQUIRED ABSENCE OF OTHER SPECIFIED PARTS OF DIGESTIVE TRACT: Chronic | ICD-10-CM

## 2022-01-12 LAB
ALBUMIN SERPL ELPH-MCNC: 4.6 G/DL — SIGNIFICANT CHANGE UP (ref 3.5–5.2)
ALP SERPL-CCNC: 70 U/L — SIGNIFICANT CHANGE UP (ref 30–115)
ALT FLD-CCNC: 11 U/L — SIGNIFICANT CHANGE UP (ref 0–41)
ANION GAP SERPL CALC-SCNC: 15 MMOL/L — HIGH (ref 7–14)
APPEARANCE UR: CLEAR — SIGNIFICANT CHANGE UP
APTT BLD: 29.5 SEC — SIGNIFICANT CHANGE UP (ref 27–39.2)
AST SERPL-CCNC: 18 U/L — SIGNIFICANT CHANGE UP (ref 0–41)
BACTERIA # UR AUTO: NEGATIVE — SIGNIFICANT CHANGE UP
BASOPHILS # BLD AUTO: 0.06 K/UL — SIGNIFICANT CHANGE UP (ref 0–0.2)
BASOPHILS NFR BLD AUTO: 1.8 % — HIGH (ref 0–1)
BILIRUB SERPL-MCNC: <0.2 MG/DL — SIGNIFICANT CHANGE UP (ref 0.2–1.2)
BILIRUB UR-MCNC: NEGATIVE — SIGNIFICANT CHANGE UP
BLD GP AB SCN SERPL QL: SIGNIFICANT CHANGE UP
BUN SERPL-MCNC: 21 MG/DL — HIGH (ref 10–20)
CALCIUM SERPL-MCNC: 9.2 MG/DL — SIGNIFICANT CHANGE UP (ref 8.5–10.1)
CHLORIDE SERPL-SCNC: 104 MMOL/L — SIGNIFICANT CHANGE UP (ref 98–110)
CO2 SERPL-SCNC: 24 MMOL/L — SIGNIFICANT CHANGE UP (ref 17–32)
COLOR SPEC: YELLOW — SIGNIFICANT CHANGE UP
CREAT SERPL-MCNC: 0.9 MG/DL — SIGNIFICANT CHANGE UP (ref 0.7–1.5)
DIFF PNL FLD: NEGATIVE — SIGNIFICANT CHANGE UP
EOSINOPHIL # BLD AUTO: 0.26 K/UL — SIGNIFICANT CHANGE UP (ref 0–0.7)
EOSINOPHIL NFR BLD AUTO: 7.6 % — SIGNIFICANT CHANGE UP (ref 0–8)
EPI CELLS # UR: 1 /HPF — SIGNIFICANT CHANGE UP (ref 0–5)
GLUCOSE SERPL-MCNC: 75 MG/DL — SIGNIFICANT CHANGE UP (ref 70–99)
GLUCOSE UR QL: NEGATIVE — SIGNIFICANT CHANGE UP
HCG SERPL-ACNC: <0.6 MIU/ML — SIGNIFICANT CHANGE UP
HCT VFR BLD CALC: 38.7 % — SIGNIFICANT CHANGE UP (ref 37–47)
HGB BLD-MCNC: 12.4 G/DL — SIGNIFICANT CHANGE UP (ref 12–16)
HYALINE CASTS # UR AUTO: 3 /LPF — SIGNIFICANT CHANGE UP (ref 0–7)
IMM GRANULOCYTES NFR BLD AUTO: 0.3 % — SIGNIFICANT CHANGE UP (ref 0.1–0.3)
INR BLD: 0.91 RATIO — SIGNIFICANT CHANGE UP (ref 0.65–1.3)
KETONES UR-MCNC: SIGNIFICANT CHANGE UP
LEUKOCYTE ESTERASE UR-ACNC: NEGATIVE — SIGNIFICANT CHANGE UP
LYMPHOCYTES # BLD AUTO: 0.92 K/UL — LOW (ref 1.2–3.4)
LYMPHOCYTES # BLD AUTO: 27.1 % — SIGNIFICANT CHANGE UP (ref 20.5–51.1)
MCHC RBC-ENTMCNC: 29.7 PG — SIGNIFICANT CHANGE UP (ref 27–31)
MCHC RBC-ENTMCNC: 32 G/DL — SIGNIFICANT CHANGE UP (ref 32–37)
MCV RBC AUTO: 92.6 FL — SIGNIFICANT CHANGE UP (ref 81–99)
MONOCYTES # BLD AUTO: 0.38 K/UL — SIGNIFICANT CHANGE UP (ref 0.1–0.6)
MONOCYTES NFR BLD AUTO: 11.2 % — HIGH (ref 1.7–9.3)
NEUTROPHILS # BLD AUTO: 1.77 K/UL — SIGNIFICANT CHANGE UP (ref 1.4–6.5)
NEUTROPHILS NFR BLD AUTO: 52 % — SIGNIFICANT CHANGE UP (ref 42.2–75.2)
NITRITE UR-MCNC: NEGATIVE — SIGNIFICANT CHANGE UP
NRBC # BLD: 0 /100 WBCS — SIGNIFICANT CHANGE UP (ref 0–0)
PH UR: 6 — SIGNIFICANT CHANGE UP (ref 5–8)
PLATELET # BLD AUTO: 253 K/UL — SIGNIFICANT CHANGE UP (ref 130–400)
POTASSIUM SERPL-MCNC: 4.4 MMOL/L — SIGNIFICANT CHANGE UP (ref 3.5–5)
POTASSIUM SERPL-SCNC: 4.4 MMOL/L — SIGNIFICANT CHANGE UP (ref 3.5–5)
PROT SERPL-MCNC: 7.6 G/DL — SIGNIFICANT CHANGE UP (ref 6–8)
PROT UR-MCNC: ABNORMAL
PROTHROM AB SERPL-ACNC: 10.5 SEC — SIGNIFICANT CHANGE UP (ref 9.95–12.87)
RBC # BLD: 4.18 M/UL — LOW (ref 4.2–5.4)
RBC # FLD: 15.3 % — HIGH (ref 11.5–14.5)
RBC CASTS # UR COMP ASSIST: 2 /HPF — SIGNIFICANT CHANGE UP (ref 0–4)
SODIUM SERPL-SCNC: 143 MMOL/L — SIGNIFICANT CHANGE UP (ref 135–146)
SP GR SPEC: 1.03 — HIGH (ref 1.01–1.03)
UROBILINOGEN FLD QL: SIGNIFICANT CHANGE UP
WBC # BLD: 3.4 K/UL — LOW (ref 4.8–10.8)
WBC # FLD AUTO: 3.4 K/UL — LOW (ref 4.8–10.8)
WBC UR QL: 2 /HPF — SIGNIFICANT CHANGE UP (ref 0–5)

## 2022-01-12 RX ORDER — ALPRAZOLAM 0.25 MG
0 TABLET ORAL
Qty: 0 | Refills: 0 | DISCHARGE

## 2022-01-12 NOTE — H&P PST ADULT - ATTENDING COMMENTS
46 year old patient of Dr. Little,  (spont abx1) with newly diagnosed Breast Cancer requesting risk reducing surgery. Patient was treated for breast cancer with bilateral mastectomies, chemotherapy and radiation. She underwent breast reconstruction. She is currently receiving Zoladex injection monthly. She has a history of ovarian cysts. CA--125 is negative (11). She is negative for BRCA or any other known genetic mutations. 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 She presents for  risk reducing surgery. She is requesting  RR TLH/BSO.  Assessment  Breast CA (174.9) (C50.919)  History of Appendectomy  History of Dilation And Curettage  History of ovarian cyst (V13.29) (Z87.42)  Family history of malignant neoplasm of uterus (V16.49) (Z80.49) : Maternal  Grandmother    Plan  RR TLH-BSO  Options for surgical management discussed. Procedures offered patient included RR laparoscopic hysterectomy with bilateral salpingo-oophorectomy, versus LAP BSO, Versus Observation..     The risk benefits and alternative to the recommended treatments were discussed. She was informed about potential complications of surgery including but not limited to bowel, bladder, and ureteral injuries. Infectious morbidity, along with bleeding and thromboembolic events were also discussed.  She showed clear understanding, was given the opportunity to ask questions and is amenable to the above RR TLH/BSO.

## 2022-01-12 NOTE — H&P PST ADULT - NS PRO FEM REPRO HEALTH SCREEN
Chief Complaint   Patient presents with   • Hematuria       SUBJECTIVE:  Peg Garcia is a 45 year old female who presents with urinary frequency, dysuria and hematuria since this am. Denies fever, chills, back pain or vomiting.  Taking oral fluids well. Denies  vaginal discharge.    History of FREEDOM in 2003 with rightSO for endometriosis.Last UTI over 20 years ago.    Review of symptoms negative except as described above.    ALLERGIES AND CURRENT MEDICATIONS REVIEWED ON EPIC.    OBJECTIVE:  Visit Vitals   • /80 (BP Location: [facility], Patient Position: Sitting, Cuff Size: Regular)   • Pulse 84   • Temp 98.4 °F (36.9 °C) (Oral)   • Resp 16   • Ht 5' 5.5\" (1.664 m)   • Wt 68.5 kg   • BMI 24.75 kg/m2     General: Appears healthy, alert, in no acute distress.  Back:  No CVA tenderness to percussion.  Abdomen: Bowel sounds present, soft, suprapubic tenderness, nondistended.    Urinalysis: positive leukocyte esterase. Trace ketones    ASSESSMENT:  1. Acute UTI        PLAN:  1.  Will start antibiotics:  Ciprofloxacin 250 mg b.i.d. ×3 days and send for urine culture.  2.  Push fluids.  3.  Discussed prevention of UTIs including urinating after intercourse, wiping front to back, staying well hydrated and not holding urine.  4.  Patient is instructed to call or return to clinic if symptoms worsen, or if develops fever, vomiting, or back pain.      Appropriate use and side effects of medications discussed with patient.  All questions answered and patient is agreeable to this plan.  Refer to After visit summary.    Juanita Feliciano M.D.   mammogram/breast self-exam

## 2022-01-12 NOTE — H&P PST ADULT - HISTORY OF PRESENT ILLNESS
45 yo f here for sx on 1/24/22- karuna, BSO for "risk reduction" and an upcoming port removal s/p karuna  pt is s/p b/l mastectomy + rt ax node + on 6/15/2021  7/16/2021-10/22/2021 chemo adriamycin x 4, cytoxin  11/29-1/4/2022 radiationx 25 tx   zoladex [id number] for estrogen suppression    pt states since starting zoladex her frequency & urgency of urine has increased & changed color- u/a c&s done in past 1/12/2022    Pt reports no cardiopulmonary issues denies sob/garnica/cp/palpitations. Pt states no recent infections no fever no cough no uti uri. Stated exercise tolerance is   2+  flights no changes. Gregorio screen revd.  pt verbalized and understanding of instructions  given and all concerns and questions asked and answered.  Pt denies any s/s covid 19 and reports no contact with known positive people. Pt has appointment for repeat covid testing pre op and instructed to continue to self monitor and report any concerns to MD. Pt will continue to practice self isolation and  exposure control measures pre op  Anesthesia Alert  NO--Difficult Airway  NO--History of neck surgery or radiation  NO--Limited ROM of neck  NO--History of Malignant hyperthermia  NO--No personal or family history of Pseudocholinesterase deficiency.  NO--Prior Anesthesia Complication  NO--Latex Allergy  NO--Loose teeth  NO--History of Rheumatoid Arthritis  NO--GREGORIO  NO--Other_____  written and verbal instructions with teach back on chlorhexidine shampoo provided,  pt verbalized understanding with returned demonstration  PT DENIES ANY RASHES, ABRASION, OR OPEN WOUNDS OR CUTS  denies travel outside the [place] in the past 30 days

## 2022-01-12 NOTE — H&P PST ADULT - ADDITIONAL PE
pt is on rt arm precautions  left chest wall abdulaziz cath noted, no s/s infection noted  rt shoulder skin rash s/p radiation

## 2022-01-13 ENCOUNTER — APPOINTMENT (OUTPATIENT)
Dept: PLASTIC SURGERY | Facility: CLINIC | Age: 47
End: 2022-01-13
Payer: COMMERCIAL

## 2022-01-13 PROCEDURE — 99212 OFFICE O/P EST SF 10 MIN: CPT

## 2022-01-13 NOTE — PROCEDURE
[FreeTextEntry1] : s/p BL TE reconstruction  [FreeTextEntry2] : Tissue expander fill  [FreeTextEntry6] : The port location was identified and marked on the skin with the aid of the implant magnet. The skin was prepped in sterile fashion. Sterile saline was injected. The skin flaps remained well perfused, pink with good capillary refill <2 sec. The patient tolerated the procedure. \par \par Left mastectomy 250 g; Right mastectomy 435 g\par \par Right TE pre-expansion volume: 260 cc\par Total volume right TE after expansion : 310 cc (desired volume)\par \par Left TE pre-expansion volume: 270 cc\par Total volume left TE after deflation : 310 cc

## 2022-01-13 NOTE — PHYSICAL EXAM
[de-identified] : well-appearing, NAD [de-identified] : Mild scoliosis\par Prominent right costal chest vs left, left upper chest with chemoport in place, c/d/i [de-identified] : Bilateral breasts soft, mastectomy flaps healthy, viable and well perfused with excellent cap refill, incisions healing well, c/d/i, no erythema or tissue ischemia, tissue expanders in good position, no fluid collection \par

## 2022-01-13 NOTE — HISTORY OF PRESENT ILLNESS
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  Denies breast pain, nipple discharge and retraction.  Here with friend, Rukhsana.\par \par Patient is learning toward bilateral mastectomy.  She is inquiring about immediate pre-pec implant based reconstruction.  She would like simplest recovery. 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 lives alone (2 cats); post-op assistance from friends\par \par Wears 34B, cup not filled.  She would like a full B in reconstructive volume.\par Recent lost weight 8 lbs. currently 118 lbs.  Mother passed away recently 2 months ago.\par \par Interval hx (21). Patient presents today POD#8 s/p BL SSM, Rt axillary dissection with immediate prepectoral TE reconstruction (filled to 100cc) with AlloDerm. Doing very well with adequate pain management. Taking oral antibiotics as prescribed. Denies any f/c or bleeding. Drains all functional with appropriate output. Rt axillary drain with minimal 5-7 cc daily. \par \par Interval hx (21). Patient presents today POD#13 s/p BL SSM, Rt axillary dissection with immediate prepectoral TE reconstruction (filled to 100cc) with AlloDerm. Doing well with no new concerns. Denies any f/c or bleeding. Drains functional with decreasing output as expected. \par \par Interval hx (21): Pt presents today POD #17 s/p BL SSM, Rt axillary dissection with immediate prepectoral TE reconstruction (filled to 100cc) with AlloDerm. Feeling well after first TE fill. Remaining right drain output: . Denies f/c, redness, swelling, CP/SOB, N/V, or calf pain.\par \par Interval hx (21): Pt presents today POD #22 s/p BL SSM, Rt axillary dissection with immediate prepectoral TE reconstruction (filled to 100cc) with AlloDerm. Saw Dr. Macias  who discussed CTX x12 weeks and recommended RT consult given 3 positive LNs. \par \par Interval hx (21): Pt presents today POD #29 s/p BL SSM, Rt axillary dissection with immediate prepectoral TE reconstruction (filled to 100cc) with AlloDerm. Continues to do well with no significant discomfort. Had chemoport placed on Monday and scheduled to commence CTX later this week. Denies any f/c, bleeding or pressure after TE fills. \par \par Interval hx (21):  Here POD#41 s/p BL immediate prepec TE/Alloderm.  Started on chemotherapy, ; next cycle on .  Pt reports she will need RIGHT BREAST XRT.  Here for serial TE fill.   Denies fevers, chills, redness, swelling.\par \par Interval hx (21):  Here for POD#56 s/p BL prepec TE/Alloderm.  s/p 2 cycles of chemo. s/p chemoport.\par \par Interval hx (21):  Pt presents today as instructed by her oncologist for evaluation of left medial breast "bubble". Denies any pain, f/c, skin changes, open wounds or drainage. Tolerating chemotx with no significant complaints. \par \par Interval hx (21). Patient presents today for left TE overfill. Doing well with no new complaints. Tolerating chemotx with alopecia as expected, otherwise no significant adverse effects. \par \par Interval hx (21): Pt presents today for follow up. After [facility] planning, Dr. Shah advised to have left TE delfated for XRT field optimization for right XRT.  no new health issues; completed chemotherapy 3 weeks ago.  no fevers, chills, redness, swelling.\par \par Interval hx (11/15/21):  Went to radiation simulation today.  Left breast still within field and will require additional TE deflation for XRT.  No other complaints\par \par Interval hx (22): Pt presents today to refill left TE as she has completed right breast XRT as of 22. Tolerated XRT very well and applying moisturizer 3x/day.\par \par Interval hx (22): Pt presents today for left TE fill. Doing well and happy with right breast TE vol. Scheduling PAUL/BSO with Dr. Suggs next month.

## 2022-01-13 NOTE — ASSESSMENT
[FreeTextEntry1] : 47 yo F with right breast cancer s/p BL SSM, Rt axillary LN dissection (Dr. Macias) with immediate prepectoral TE reconstruction (filled to 100cc) with AlloDerm (6/15/21). Doing very well. \par \par Tolerating serial TE fill\par Right total 310 cc (desired volume for bilateral breasts)\par Left breast previously at 340cc, then deflated to 210cc as left TE was in the right breast XRT field\par Doing well. \par \par - left TE fill today: 40cc for new total of 310cc\par - c/w sports bra\par - sleep supine\par - pt knows to call for fevers, chills, breast redness and swelling.\par - F/u next month with Dr. Gallegos as previously scheduled \par \par Due to COVID-19, pre-visit patient instructions were explained to the patient and their symptoms were checked upon arrival. Masks were used by the healthcare provider and staff and the examination room was cleaned after the patient visit concluded\par \par \par

## 2022-01-15 LAB
CULTURE RESULTS: NO GROWTH — SIGNIFICANT CHANGE UP
SPECIMEN SOURCE: SIGNIFICANT CHANGE UP

## 2022-01-21 NOTE — ASU PATIENT PROFILE, ADULT - FALL HARM RISK - UNIVERSAL INTERVENTIONS
Bed in lowest position, wheels locked, appropriate side rails in place/Call bell, personal items and telephone in reach/Instruct patient to call for assistance before getting out of bed or chair/Non-slip footwear when patient is out of bed/[place] to call system/Physically safe environment - no spills, clutter or unnecessary equipment/Purposeful Proactive Rounding/Room/bathroom lighting operational, light cord in reach

## 2022-01-24 ENCOUNTER — RESULT REVIEW (OUTPATIENT)
Age: 47
End: 2022-01-24

## 2022-01-24 ENCOUNTER — APPOINTMENT (OUTPATIENT)
Dept: GYNECOLOGIC ONCOLOGY | Facility: HOSPITAL | Age: 47
End: 2022-01-24
Payer: COMMERCIAL

## 2022-01-24 ENCOUNTER — OUTPATIENT (OUTPATIENT)
Dept: OUTPATIENT SERVICES | Facility: HOSPITAL | Age: 47
LOS: 1 days | Discharge: HOME | End: 2022-01-24
Payer: COMMERCIAL

## 2022-01-24 VITALS
HEIGHT: 66 IN | HEART RATE: 74 BPM | DIASTOLIC BLOOD PRESSURE: 83 MMHG | RESPIRATION RATE: 16 BRPM | OXYGEN SATURATION: 100 % | SYSTOLIC BLOOD PRESSURE: 131 MMHG | WEIGHT: 130.07 LBS | TEMPERATURE: 99 F

## 2022-01-24 VITALS
TEMPERATURE: 98 F | HEART RATE: 72 BPM | SYSTOLIC BLOOD PRESSURE: 133 MMHG | DIASTOLIC BLOOD PRESSURE: 91 MMHG | OXYGEN SATURATION: 100 % | RESPIRATION RATE: 14 BRPM

## 2022-01-24 DIAGNOSIS — Z98.890 OTHER SPECIFIED POSTPROCEDURAL STATES: Chronic | ICD-10-CM

## 2022-01-24 DIAGNOSIS — M67.40 GANGLION, UNSPECIFIED SITE: Chronic | ICD-10-CM

## 2022-01-24 DIAGNOSIS — Z90.49 ACQUIRED ABSENCE OF OTHER SPECIFIED PARTS OF DIGESTIVE TRACT: Chronic | ICD-10-CM

## 2022-01-24 DIAGNOSIS — Z90.13 ACQUIRED ABSENCE OF BILATERAL BREASTS AND NIPPLES: Chronic | ICD-10-CM

## 2022-01-24 PROCEDURE — 58571 TLH W/T/O 250 G OR LESS: CPT

## 2022-01-24 PROCEDURE — 88307 TISSUE EXAM BY PATHOLOGIST: CPT | Mod: 26

## 2022-01-24 RX ORDER — HYDROMORPHONE HYDROCHLORIDE 2 MG/ML
0.5 INJECTION INTRAMUSCULAR; INTRAVENOUS; SUBCUTANEOUS
Refills: 0 | Status: DISCONTINUED | OUTPATIENT
Start: 2022-01-24 | End: 2022-01-24

## 2022-01-24 RX ORDER — SODIUM CHLORIDE 9 MG/ML
1000 INJECTION, SOLUTION INTRAVENOUS
Refills: 0 | Status: DISCONTINUED | OUTPATIENT
Start: 2022-01-24 | End: 2022-01-24

## 2022-01-24 RX ORDER — IBUPROFEN 200 MG
1 TABLET ORAL
Qty: 56 | Refills: 0
Start: 2022-01-24 | End: 2022-02-06

## 2022-01-24 RX ORDER — SIMETHICONE 80 MG/1
1 TABLET, CHEWABLE ORAL
Qty: 56 | Refills: 0
Start: 2022-01-24 | End: 2022-02-06

## 2022-01-24 RX ORDER — OXYCODONE HYDROCHLORIDE 5 MG/1
1 TABLET ORAL
Qty: 5 | Refills: 0
Start: 2022-01-24

## 2022-01-24 RX ORDER — ONDANSETRON 8 MG/1
4 TABLET, FILM COATED ORAL ONCE
Refills: 0 | Status: DISCONTINUED | OUTPATIENT
Start: 2022-01-24 | End: 2022-01-24

## 2022-01-24 RX ORDER — ACETAMINOPHEN 500 MG
2 TABLET ORAL
Qty: 112 | Refills: 0
Start: 2022-01-24 | End: 2022-02-06

## 2022-01-24 RX ORDER — APREPITANT 80 MG/1
40 CAPSULE ORAL ONCE
Refills: 0 | Status: COMPLETED | OUTPATIENT
Start: 2022-01-24 | End: 2022-01-24

## 2022-01-24 RX ORDER — DOCUSATE SODIUM 100 MG
1 CAPSULE ORAL
Qty: 28 | Refills: 0
Start: 2022-01-24 | End: 2022-02-06

## 2022-01-24 RX ADMIN — APREPITANT 40 MILLIGRAM(S): 80 CAPSULE ORAL at 07:31

## 2022-01-24 NOTE — BRIEF OPERATIVE NOTE - OPERATION/FINDINGS
normal uterus, tubes and ovaries bilaterally  no cervical or vaginal lesions  no significant abdominal pathology

## 2022-01-24 NOTE — BRIEF OPERATIVE NOTE - NSICDXBRIEFPROCEDURE_GEN_ALL_CORE_FT
PROCEDURES:  Laparoscopic hysterectomy, total, with BSO, uterus 250 g or less 24-Jan-2022 10:58:42  Eunice Carmona

## 2022-01-24 NOTE — PRE-ANESTHESIA EVALUATION ADULT - WEIGHT IN LBS
What Is The Reason For Today's Visit?: Full Body Skin Examination What Is The Reason For Today's Visit? (Being Monitored For X): concerning skin lesions on an annual basis How Severe Are Your Spot(S)?: mild 130

## 2022-01-24 NOTE — ASU DISCHARGE PLAN (ADULT/PEDIATRIC) - CARE PROVIDER_API CALL
Tati Suggs)  Gynecologic Oncology; Obstetrics and Gynecology  [address] [place] [address], [address] [place] [address]  Phone: [phone]  Fax: [phone]  Follow Up Time: 2 weeks

## 2022-01-24 NOTE — ASU DISCHARGE PLAN (ADULT/PEDIATRIC) - NS MD DC FALL RISK RISK
cough  weakness  altered mental status
For information on Fall & Injury Prevention, visit: [email or web address] OR  [email or web address] OR  https://www.cdc.gov/steadi/patient.html

## 2022-01-24 NOTE — CHART NOTE - NSCHARTNOTEFT_GEN_A_CORE
PACU ANESTHESIA ADMISSION NOTE      Procedure: Total Laparoscopic Hysterectomy BSO Lysis of Adhesions  Post op diagnosis:      ____  Intubated  TV:______       Rate: ______      FiO2: ______    _x___  Patent Airway    _x___  Full return of protective reflexes    ___x_  Full recovery from anesthesia / back to baseline status    Vitals:  T 98  /78  RR 18  SD 62  O2 Sat 99  Mental Status:  _x___ Awake   _____ Alert   _____ Drowsy   _____ Sedated    Nausea/Vomiting:  _x___ NO  ______Yes,   See Post - Op Orders          Pain Scale (0-10):  ___0__    Treatment: ____ None    ____ See Post - Op/PCA Orders    Post - Operative Fluids:   ____ Oral   __x__ See Post - Op Orders    Plan: Discharge:   _x___Home       _____Floor     _____Critical Care    _____  Other:_________________    Comments:

## 2022-01-24 NOTE — ASU DISCHARGE PLAN (ADULT/PEDIATRIC) - ASU DC SPECIAL INSTRUCTIONSFT
Nothing in the vagina for 6-8 weeks (no sex, no tampons, no douching). Avoid tub baths, you may shower.    If you have a fever of 100.4F or greater, severe vaginal bleeding, or severe abdominal pain, call your Ob/Gyn or come to the emergency department immediately.    Walking is encouraged, stairs are OK  No heavy lifting or straining  Use abdominal binder as often as possible  glue on incisions will dissolve in 7-10 days, if not dissolved by then may use some vaseline and peel off    Medications have been sent to your pharmacy: motrin/tylenol (pain), oxycodone (severe pain), simethicone (gas), colace (constipation)  take all as needed

## 2022-01-25 ENCOUNTER — NON-APPOINTMENT (OUTPATIENT)
Age: 47
End: 2022-01-25

## 2022-01-25 LAB — SURGICAL PATHOLOGY STUDY: SIGNIFICANT CHANGE UP

## 2022-01-27 ENCOUNTER — APPOINTMENT (OUTPATIENT)
Dept: HEMATOLOGY ONCOLOGY | Facility: CLINIC | Age: 47
End: 2022-01-27

## 2022-01-27 ENCOUNTER — APPOINTMENT (OUTPATIENT)
Dept: INFUSION THERAPY | Facility: CLINIC | Age: 47
End: 2022-01-27

## 2022-01-27 DIAGNOSIS — N80.0 ENDOMETRIOSIS OF UTERUS: ICD-10-CM

## 2022-01-27 DIAGNOSIS — F41.9 ANXIETY DISORDER, UNSPECIFIED: ICD-10-CM

## 2022-01-27 DIAGNOSIS — I10 ESSENTIAL (PRIMARY) HYPERTENSION: ICD-10-CM

## 2022-01-27 DIAGNOSIS — N72 INFLAMMATORY DISEASE OF CERVIX UTERI: ICD-10-CM

## 2022-01-27 DIAGNOSIS — Z85.3 PERSONAL HISTORY OF MALIGNANT NEOPLASM OF BREAST: ICD-10-CM

## 2022-01-27 DIAGNOSIS — N88.8 OTHER SPECIFIED NONINFLAMMATORY DISORDERS OF CERVIX UTERI: ICD-10-CM

## 2022-02-02 ENCOUNTER — APPOINTMENT (OUTPATIENT)
Dept: RADIATION ONCOLOGY | Facility: HOSPITAL | Age: 47
End: 2022-02-02
Payer: COMMERCIAL

## 2022-02-07 ENCOUNTER — LABORATORY RESULT (OUTPATIENT)
Age: 47
End: 2022-02-07

## 2022-02-08 ENCOUNTER — APPOINTMENT (OUTPATIENT)
Dept: HEMATOLOGY ONCOLOGY | Facility: CLINIC | Age: 47
End: 2022-02-08

## 2022-02-08 ENCOUNTER — APPOINTMENT (OUTPATIENT)
Dept: GYNECOLOGIC ONCOLOGY | Facility: CLINIC | Age: 47
End: 2022-02-08
Payer: COMMERCIAL

## 2022-02-08 VITALS
SYSTOLIC BLOOD PRESSURE: 110 MMHG | BODY MASS INDEX: 21.83 KG/M2 | HEIGHT: 65 IN | WEIGHT: 131 LBS | DIASTOLIC BLOOD PRESSURE: 76 MMHG

## 2022-02-08 PROCEDURE — 99024 POSTOP FOLLOW-UP VISIT: CPT

## 2022-02-08 NOTE — END OF VISIT
[FreeTextEntry4] : 46 year old patient of Dr. Little,  (spont abx1) with newly diagnosed Breast Cancer requesting risk reducing surgery.

## 2022-02-08 NOTE — ASSESSMENT
[FreeTextEntry1] : 46 year old patient of Dr. Little,  (spont abx1) with newly diagnosed Breast Cancer requesting risk reducing surgery. Patient was treated for breast cancer with bilateral mastectomies, chemotherapy and radiation. She underwent breast reconstruction. She is currently receiving Zoladex injection monthly. She has a history of ovarian cysts. CA--125 is negative (11). She is negative for BRCA or any other known genetic mutations. 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\par \par Surgical Pathology Report \par Specimen(s) Submitted\par Uterus, cervix, bilateral tubes and ovaries\par \par Final Diagnosis\par Uterus, cervix, bilateral tubes and ovaries; total laparoscopic\par hysterectomy and BSO:\par - Benign leiomyoma.\par - The myometrium also showing superficial foci of adenomyosis.\par \par - Benign endometrial polyp and inactive endometrium with cystic atrophy,\par no hyperplasia or atypia seen.\par - Cervix showing chronic cervicitis with Nabothian cysts.\par - Benign ovary showing corpus albicans and few benign glandular\par inclusions, no neoplasm identified.\par - Benign fallopian tubes with few paratubal simple cysts.\par \par Note: There is no histologic evidence of metastatic carcinoma identified\par in this specimen. Clinical correlation is recommended. The patient's\par prior specimen report (25-XU-) was noticed.\par Verified by: Cristofer Fleming M.D.\par (Electronic Signature)\par Reported on: 22 18:14 [facility], [facility],\par [address] [place] [address], [place] [address]\par Phone: [phone]   Fax: [phone]

## 2022-02-08 NOTE — DISCUSSION/SUMMARY
[Clean] : was clean [Dry] : was dry [Intact] : was intact [Erythema] : was not erythematous [Ecchymosis] : was not ecchymotic [Seroma] : had no seroma [None] : had no drainage [Normal Skin] : normal appearance [Firm] : soft [Tender] : nontender [Abnormal Bowel Sounds] : normal bowel sounds [Rebound] : no rebound tenderness [Guarding] : no guarding [Incisional Hernia] : no incisional hernia [Mass] : no palpable mass [Cervical Abnormality] : normal cervix [External Genitalia Abnormal] : normal external genitalia [Vaginal Exam Abnormal] : normal vaginal exam [Doing Well] : is doing well [Excellent Pain Control] : has excellent pain control [No Sign of Infection] : is showing no signs of infection

## 2022-02-10 ENCOUNTER — OUTPATIENT (OUTPATIENT)
Dept: OUTPATIENT SERVICES | Facility: HOSPITAL | Age: 47
LOS: 1 days | Discharge: HOME | End: 2022-02-10
Payer: COMMERCIAL

## 2022-02-10 ENCOUNTER — RESULT REVIEW (OUTPATIENT)
Age: 47
End: 2022-02-10

## 2022-02-10 DIAGNOSIS — Z98.890 OTHER SPECIFIED POSTPROCEDURAL STATES: Chronic | ICD-10-CM

## 2022-02-10 DIAGNOSIS — M67.40 GANGLION, UNSPECIFIED SITE: Chronic | ICD-10-CM

## 2022-02-10 DIAGNOSIS — Z90.49 ACQUIRED ABSENCE OF OTHER SPECIFIED PARTS OF DIGESTIVE TRACT: Chronic | ICD-10-CM

## 2022-02-10 DIAGNOSIS — Z45.2 ENCOUNTER FOR ADJUSTMENT AND MANAGEMENT OF VASCULAR ACCESS DEVICE: ICD-10-CM

## 2022-02-10 DIAGNOSIS — Z90.13 ACQUIRED ABSENCE OF BILATERAL BREASTS AND NIPPLES: Chronic | ICD-10-CM

## 2022-02-10 PROCEDURE — 99152 MOD SED SAME PHYS/QHP 5/>YRS: CPT | Mod: 59

## 2022-02-10 PROCEDURE — 36590 REMOVAL TUNNELED CV CATH: CPT

## 2022-02-10 PROCEDURE — 77001 FLUOROGUIDE FOR VEIN DEVICE: CPT | Mod: 26

## 2022-02-10 NOTE — PROGRESS NOTE ADULT - SUBJECTIVE AND OBJECTIVE BOX
Interventional Radiology Outpatient Documentation    PRE-OPERATIVE DAY OF PROCEDURE EVALUATION:     I have personally seen and examined this patient.  I agree with the history and physical which I have reviewed and noted any changes below:     Plan is for left chest-port removal with intravenous conscious sedation (Signed electronically Mainor Farmer MD) 02-10-22 @ 08:02     Procedure/ risks/ benefits/ goals/ alternatives were explained.  All questions answered.  Informed content obtained from patient.  Consent placed in chart.     POST-OPERATIVE PROCEDURAL EVALUATION:     Procedure:  Left chest-port removal    Pre-Op Diagnosis:  Breast Cancer-- No further need for chemotherapy via port    Post-Op Diagnosis:  Same    Attending:  Marleny  Resident:  None    Antibiotic Prophylaxis:  Ancef, 1 g, ivpb    Anesthesia (type):  [ ] General Anesthesia  [X] Sedation-- Versed, 4mg and Fentanyl, 100 mcg, iv  [ ] Spinal Anesthesia  [X] Local/Regional-- 1% Lidocaine, 5 cc SQ  and 1% Lidocaine with epinephrine, 5 cc SQ    Total Face-to-Face Sedation Time:  50 minutes    Contrast:  None    Blood Loss:  3 cc    Condition:   [ ] Critical  [ ] Serious  [ ] Fair   [X] Good    Findings/Follow up Plan of Care:  8-Tristanian single lumen Angiodynamics Smart-port chest-port system removed uneventfully from left internal jugular vein.  Patient tolerated well, without incident.     Specimens Removed:  None    Implants:  None    Complications:  None immediate    Disposition:  Recovery then D/C home. F/U IR in 7-10 days to evaluate wound healing.

## 2022-02-14 ENCOUNTER — APPOINTMENT (OUTPATIENT)
Dept: PLASTIC SURGERY | Facility: CLINIC | Age: 47
End: 2022-02-14
Payer: COMMERCIAL

## 2022-02-14 ENCOUNTER — TRANSCRIPTION ENCOUNTER (OUTPATIENT)
Age: 47
End: 2022-02-14

## 2022-02-14 PROCEDURE — 99212 OFFICE O/P EST SF 10 MIN: CPT

## 2022-02-14 NOTE — PHYSICAL EXAM
[de-identified] : well-appearing, NAD [de-identified] : Mild scoliosis\par Prominent right costal chest vs left, left upper chest with chemoport surgical dressing in place [de-identified] : Bilateral breasts soft, mastectomy flaps healthy, viable and well perfused with excellent cap refill, incisions healing well, c/d/i, no erythema or tissue ischemia, tissue expanders in good position, no fluid collection \par

## 2022-02-14 NOTE — PROCEDURE
[FreeTextEntry1] : s/p BL TE reconstruction  [FreeTextEntry6] : The port location was identified and marked on the skin with the aid of the implant magnet. The skin was prepped in sterile fashion. Sterile saline was injected. The skin flaps remained well perfused, pink with good capillary refill <2 sec. The patient tolerated the procedure. \par \par Left mastectomy 250 g; Right mastectomy 435 g\par \par Right TE pre-expansion volume: 310 cc (desire volume)\par Total volume right TE after expansion : 340 cc\par \par Left TE pre-expansion volume: 310 cc (desired)\par Total volume left TE after deflation : 350 cc [FreeTextEntry2] : Tissue expander fill

## 2022-02-14 NOTE — ASSESSMENT
[FreeTextEntry1] : 45 yo F with right breast cancer s/p BL SSM, Rt axillary LN dissection (Dr. Macias) with immediate prepectoral TE reconstruction (filled to 100cc) with AlloDerm (6/15/21). Doing very well. \par \par Tolerating serial TE overfill\par Right total 310 cc (desired volume for bilateral breasts)\par Doing well. \par \par - c/w sports bra (consider larger size to reduce lateral breast compressional deformity)\par - sleep supine\par - pt knows to call for fevers, chills, breast redness and swelling.\par - F/u 3 months for possible surgical booking for implant exchange\par \par Photos were taken with patient permission\par \par Due to COVID-19, pre-visit patient instructions were explained to the patient and their symptoms were checked upon arrival. Masks were used by the healthcare provider and staff and the examination room was cleaned after the patient visit concluded\par \par \par

## 2022-02-14 NOTE — HISTORY OF PRESENT ILLNESS
[FreeTextEntry1] : 46 yo   F with PMHx of anxiety/depression, HTN, GERD, asthma who was recently dx with RIGHT invasive ductal carcinoma and DCIS 2.4 cm @ 10:00 (ER+/CO+/HER2(-)) after palpable mass in right breast.  Denies breast pain, nipple discharge and retraction.  Here with friend, Rukhsana.\par \par Patient is learning toward bilateral mastectomy.  She is inquiring about immediate pre-pec implant based reconstruction.  She would like simplest recovery. 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 lives alone (2 cats); post-op assistance from friends\par \par Wears 34B, cup not filled.  She would like a full B in reconstructive volume.\par Recent lost weight 8 lbs. currently 118 lbs.  Mother passed away recently 2 months ago.\par \par Interval hx (21). Patient presents today POD#8 s/p BL SSM, Rt axillary dissection with immediate prepectoral TE reconstruction (filled to 100cc) with AlloDerm. Doing very well with adequate pain management. Taking oral antibiotics as prescribed. Denies any f/c or bleeding. Drains all functional with appropriate output. Rt axillary drain with minimal 5-7 cc daily. \par \par Interval hx (21). Patient presents today POD#13 s/p BL SSM, Rt axillary dissection with immediate prepectoral TE reconstruction (filled to 100cc) with AlloDerm. Doing well with no new concerns. Denies any f/c or bleeding. Drains functional with decreasing output as expected. \par \par Interval hx (21): Pt presents today POD #17 s/p BL SSM, Rt axillary dissection with immediate prepectoral TE reconstruction (filled to 100cc) with AlloDerm. Feeling well after first TE fill. Remaining right drain output: . Denies f/c, redness, swelling, CP/SOB, N/V, or calf pain.\par \par Interval hx (21): Pt presents today POD #22 s/p BL SSM, Rt axillary dissection with immediate prepectoral TE reconstruction (filled to 100cc) with AlloDerm. Saw Dr. Macias  who discussed CTX x12 weeks and recommended RT consult given 3 positive LNs. \par \par Interval hx (21): Pt presents today POD #29 s/p BL SSM, Rt axillary dissection with immediate prepectoral TE reconstruction (filled to 100cc) with AlloDerm. Continues to do well with no significant discomfort. Had chemoport placed on Monday and scheduled to commence CTX later this week. Denies any f/c, bleeding or pressure after TE fills. \par \par Interval hx (21):  Here POD#41 s/p BL immediate prepec TE/Alloderm.  Started on chemotherapy, ; next cycle on .  Pt reports she will need RIGHT BREAST XRT.  Here for serial TE fill.   Denies fevers, chills, redness, swelling.\par \par Interval hx (21):  Here for POD#56 s/p BL prepec TE/Alloderm.  s/p 2 cycles of chemo. s/p chemoport.\par \par Interval hx (21):  Pt presents today as instructed by her oncologist for evaluation of left medial breast "bubble". Denies any pain, f/c, skin changes, open wounds or drainage. Tolerating chemotx with no significant complaints. \par \par Interval hx (21). Patient presents today for left TE overfill. Doing well with no new complaints. Tolerating chemotx with alopecia as expected, otherwise no significant adverse effects. \par \par Interval hx (21): Pt presents today for follow up. After [facility] planning, Dr. Shah advised to have left TE delfated for XRT field optimization for right XRT.  no new health issues; completed chemotherapy 3 weeks ago.  no fevers, chills, redness, swelling.\par \par Interval hx (11/15/21):  Went to radiation simulation today.  Left breast still within field and will require additional TE deflation for XRT.  No other complaints\par \par Interval hx (22): Pt presents today to refill left TE as she has completed right breast XRT as of 22. Tolerated XRT very well and applying moisturizer 3x/day.\par \par Interval hx (22): Pt presents today for left TE fill. Doing well and happy with right breast TE vol. Scheduling PAUL/BSO with Dr. Suggs next month. \par \par Interval hx (22): Here today for post-XRT breast exam; completed XRT 22.  she is also s/p lap PAUL (22), IR removed chemo-port 2/10/22.  She is inquiring about possible additional TE fill/upsize.

## 2022-02-16 ENCOUNTER — APPOINTMENT (OUTPATIENT)
Dept: RADIATION ONCOLOGY | Facility: HOSPITAL | Age: 47
End: 2022-02-16
Payer: COMMERCIAL

## 2022-02-16 VITALS
TEMPERATURE: 96.6 F | DIASTOLIC BLOOD PRESSURE: 81 MMHG | SYSTOLIC BLOOD PRESSURE: 124 MMHG | RESPIRATION RATE: 12 BRPM | BODY MASS INDEX: 21.97 KG/M2 | OXYGEN SATURATION: 97 % | HEART RATE: 89 BPM | WEIGHT: 132 LBS

## 2022-02-16 PROCEDURE — 99024 POSTOP FOLLOW-UP VISIT: CPT

## 2022-02-16 NOTE — PHYSICAL EXAM
[Sclera] : the sclera and conjunctiva were normal [Hearing Threshold Finger Rub Not Evans] : hearing was normal [Heart Rate And Rhythm] : heart rate and rhythm were normal [Heart Sounds] : normal S1 and S2 [Murmurs] : no murmurs present [Edema] : no peripheral edema present [Breast Reconstruction Right] : breast reconstruction [Breast Reconstruction Left] : breast reconstruction [No Masses] : no breast masses were palpable [Abdomen Soft] : soft [Nondistended] : nondistended [Abdomen Tenderness] : non-tender [Scar] : a scar was noted [Cervical Lymph Nodes Enlarged Posterior Bilaterally] : posterior cervical [Cervical Lymph Nodes Enlarged Anterior Bilaterally] : anterior cervical [Supraclavicular Lymph Nodes Enlarged Bilaterally] : supraclavicular [Skin Color & Pigmentation] : normal skin color and pigmentation [No Focal Deficits] : no focal deficits [Motor Exam] : the motor exam was normal [Normal] : oriented to person, place and time, the affect was normal, the mood was normal and not anxious [___] : no erythema [Enlargement Of The Right Breast] : no swelling [Tenderness Of The Right Breast] : no tenderness [Axillary Lymph Nodes Enlarged Bilaterally] : no enlarged nodes [de-identified] : mild residual hyperpigmentation  [de-identified] : incisional scars healing well

## 2022-02-16 NOTE — HISTORY OF PRESENT ILLNESS
[FreeTextEntry1] : LADONNA GUZMAN returns to clinic in follow up visit.  As you know, LADONNA GUZMAN is a 46 year old female with invasive ductal carcinoma of the right breast, upper outer quadrant, G3, ER /NJ positive / HER 2 negative, [id number], Stage IIA.  She is s/p bilateral mastectomies and TE reconstruction, right ALND and chemotherapy.  The patient was treated to the right reconstructed breast and regional nodes using a 3D mono-isocentric technique.  The patient tolerated treatments quite well. The patient had the expected side effects of skin erythema and fatigue.  The patient received 5000 cGy to the right reconstructed breast and right Supraclavicular nodes from 11/29/2021  through 1/4/2022.  I last saw her in January.    In the interim, the patient reports doing well.     She had refill to the left TE on 1/5 & 1/13/22. She is s/p PAUL/BSO with Dr. Suggs on 1/24/2022, recovered well.   She is tolerating Anastrozole with mild hot flashes and joint pain.  Her port a -cath was removed on 2/10/2022.  Otherwise, she has put on weight, is less fatigue and has  no new concerns.  She continues with skin care, moisturizing 2X/day.  She denies breast pain.\par \par Interim Imaging: \par Upcoming appointments: \par Dr. Suggs 4/5/2022\par Dr. Little 2/17/2022\par Dr. Gurrola 5/17/2022\par \par

## 2022-02-16 NOTE — REVIEW OF SYSTEMS
[Joint Pain] : joint pain [Insomnia] : insomnia [Hot Flashes] : hot flashes [Negative] : Neurological [Dysphagia] : no dysphagia [Chest Pain] : no chest pain [Palpitations] : no palpitations [Lower Ext Edema] : no lower extremity edema [Shortness Of Breath] : no shortness of breath [Wheezing] : no wheezing [Cough] : no cough [Muscle Pain] : no muscle pain [Muscle Weakness] : no muscle weakness [Suicidal] : not suicidal [Anxiety] : no anxiety [Depression] : no depression [FreeTextEntry9] : mild [de-identified] : chronic, at times  [de-identified] : mild

## 2022-02-16 NOTE — DISEASE MANAGEMENT
[FreeTextEntry4] : invasive ductal carcinoma of the right breast, upper outer quadrant, G3, ER /VT positive / HER 2 negative  [TTNM] : 2 [NTNM] : 1a [MTNM] : 0 [de-identified] : 5000cGy [de-identified] : 5000cGy [de-identified] : right CW/Sclav

## 2022-02-17 ENCOUNTER — APPOINTMENT (OUTPATIENT)
Dept: INTERVENTIONAL RADIOLOGY/VASCULAR | Facility: CLINIC | Age: 47
End: 2022-02-17

## 2022-02-17 ENCOUNTER — OUTPATIENT (OUTPATIENT)
Dept: OUTPATIENT SERVICES | Facility: HOSPITAL | Age: 47
LOS: 1 days | Discharge: HOME | End: 2022-02-17

## 2022-02-17 ENCOUNTER — APPOINTMENT (OUTPATIENT)
Dept: HEMATOLOGY ONCOLOGY | Facility: CLINIC | Age: 47
End: 2022-02-17
Payer: COMMERCIAL

## 2022-02-17 VITALS
BODY MASS INDEX: 21.99 KG/M2 | TEMPERATURE: 98.1 F | RESPIRATION RATE: 14 BRPM | WEIGHT: 132 LBS | SYSTOLIC BLOOD PRESSURE: 129 MMHG | DIASTOLIC BLOOD PRESSURE: 87 MMHG | HEIGHT: 65 IN | HEART RATE: 80 BPM

## 2022-02-17 DIAGNOSIS — Z90.13 ACQUIRED ABSENCE OF BILATERAL BREASTS AND NIPPLES: Chronic | ICD-10-CM

## 2022-02-17 DIAGNOSIS — E04.1 NONTOXIC SINGLE THYROID NODULE: ICD-10-CM

## 2022-02-17 DIAGNOSIS — Z98.890 OTHER SPECIFIED POSTPROCEDURAL STATES: ICD-10-CM

## 2022-02-17 DIAGNOSIS — Z98.890 OTHER SPECIFIED POSTPROCEDURAL STATES: Chronic | ICD-10-CM

## 2022-02-17 DIAGNOSIS — Z90.49 ACQUIRED ABSENCE OF OTHER SPECIFIED PARTS OF DIGESTIVE TRACT: Chronic | ICD-10-CM

## 2022-02-17 DIAGNOSIS — M67.40 GANGLION, UNSPECIFIED SITE: Chronic | ICD-10-CM

## 2022-02-17 PROCEDURE — XXXXX: CPT | Mod: 1L

## 2022-02-17 PROCEDURE — 99215 OFFICE O/P EST HI 40 MIN: CPT

## 2022-02-17 NOTE — PHYSICAL EXAM
[Alert] : alert [No Acute Distress] : no acute distress [Well Nourished] : well nourished [Well Developed] : well developed [Healthy Appearance] : healthy appearance [PERRL] : pupils equal, round and reactive to light [No Respiratory Distress] : no respiratory distress [No Accessory Muscle Use] : no accessory muscle use [No Rash] : no rash [No Skin Lesions] : no skin lesions [Oriented x3] : oriented to person, place, and time [Normal Insight/Judgement] : insight and judgment were intact [Normal Affect] : the affect was normal [Normal Mood] : the mood was normal [Recent Memory Normal] : recent memory was not impaired [Remote Memory Normal] : remote memory was not impaired [Fully active, able to carry on all pre-disease performance without restriction] : Fully active, able to carry on all pre-disease performance without restriction

## 2022-02-18 DIAGNOSIS — C50.411 MALIGNANT NEOPLASM OF UPPER-OUTER QUADRANT OF RIGHT FEMALE BREAST: ICD-10-CM

## 2022-02-18 DIAGNOSIS — Z79.811 LONG TERM (CURRENT) USE OF AROMATASE INHIBITORS: ICD-10-CM

## 2022-02-18 DIAGNOSIS — C50.919 MALIGNANT NEOPLASM OF UNSPECIFIED SITE OF UNSPECIFIED FEMALE BREAST: ICD-10-CM

## 2022-02-18 DIAGNOSIS — K82.4 CHOLESTEROLOSIS OF GALLBLADDER: ICD-10-CM

## 2022-02-18 DIAGNOSIS — E04.1 NONTOXIC SINGLE THYROID NODULE: ICD-10-CM

## 2022-02-18 NOTE — PHYSICAL EXAM
[Fully active, able to carry on all pre-disease performance without restriction] : Status 0 - Fully active, able to carry on all pre-disease performance without restriction [Normal] : affect appropriate [de-identified] : B/L mastectomy with expanders in place

## 2022-02-18 NOTE — RESULTS/DATA
[FreeTextEntry1] : US THYROID ONLY\par \par \par PROCEDURE DATE: 08/25/2021\par \par \par \par \par INTERPRETATION: Clinical history: Neck mass.\par \par Technique: Screening ultrasound of the soft tissues of the neck as well as thyroid ultrasound..\par \par Comparison: CT scan dated May 4, 2021\par \par Findings:\par \par In the palpable area indicated by the patient in the left neck, there is a 1 cm x 6 mm proximal lymph node.\par \par Thyroid gland is homogeneous.\par \par The isthmus measures 2 mm. The right lobe of the thyroid measures 4.1 x 1.4 x 1.3 cm and is normal. The left lobe of the thyroid measures 3.4 x 1.1 x 1.4 cm. It contains a 7 mm x 8 mm solid mild medial with a adjacent 6 mm x 6 mm solid nodule. Both of these are TI-RADS 4 and require follow-up in one years time.\par \par Impression:\par \par 2 distinct left thyroid nodules, one year follow-up suggested.\par \par Benign left neck lymph node\par \par US ABDOMEN RT UPR QUADRANT\par \par \par PROCEDURE DATE: 09/14/2021\par \par \par \par \par INTERPRETATION: CLINICAL INFORMATION: Gallbladder polyp evaluation\par \par COMPARISON: CT abdomen and pelvis performed 5/2/2021.\par \par TECHNIQUE: Sonography of the right upper quadrant.\par \par FINDINGS:\par \par Liver: 14.6 cm in length x 10.5 cm AP. Echogenicity and contour are within normal limits.\par Bile ducts: Normal caliber. Common bile duct measures 2 mm.\par Gallbladder: Multiple echogenic nonmobile nonshadowing foci on the mucosal surface of the gallbladder, largest measuring 7.4 mm and 4.9 mm, likely gallbladder polyps. Normal gallbladder wall thickness measuring 1.2 mm. Negative sonographic Velez's sign.\par Pancreas: Visualized portions are within normal limits.\par Right kidney: 11.2 cm. No hydronephrosis or calculi.\par Ascites: None.\par IVC: Visualized portions are within normal limits.\par \par IMPRESSION:\par \par Multiple gallbladder polyps measuring up to 7.4 mm. Recommend sonographic follow-up in 12 months.

## 2022-02-18 NOTE — HISTORY OF PRESENT ILLNESS
[de-identified] : This is a 45 premenopausal woman being seen for wt loss.\par Pt has Lost 15 lbs in the last 6 mths. \par She has a good appetite.\par C/o having drenching  night sweats more often.\par She is known to have enlarged  neck Lns, saw ENt however could not get FNA as the nodes were too small.\par Had recent change in bowel habits over the last month or so with constipation and diarrhea. She has a follow up with GI.\par Has heavy periods, has fibroids, will see GYN \par She has an enlarged LNs in the groin\par colonoscopy done in 12/2/19 showed 7mm ascending colon polyp.\par EGD non erosive gastritis [de-identified] : ONCOTYPE DX RS 32.\par RR at 7 yrs 25%.\par With chemo benefit from chemo 15% [de-identified] : 5/11/21\par Pt is here for follow up.\par She is here to discuss results and further plan of care.\par She has completed breast biopsy.\par She has surgery appt today.\par She also has a GI appt later in the week\par \par 6/28/21\par Pt is here for follow up.\par She is s/p B/L mastectomy with TE insertion.\par There were no post op complications.\par She still has a drain in place.\par She is here to discuss adjuvant therapy.\par Pt still is menstruating.\par \par 7/29/21\par pt is here for follow up.\par She is s/p 1 cycle of AC. She tolerated it well. No N, V, D, mouth sores, fever.\par Has gastric reflux.\par Had bleeding post Zoladex injection\par \par 8/12/2021\par Patient is here to follow up for breast cancer\par She is s/p cycle #2 Adriamycin/Cytoxan, tolerating well\par She is also on Zoladex\par She feels well today, appetite is good\par She denies nausea, vomiting, or any chest wall concerns\par She c/o of mild spasm after voiding even after completing Cipro x 5days\par \par 8/25/21\par Pt is here for follow up.\par Tolerating chemo with some SE. Nausea controlled with Zofran. Had UTI few weeks ago, treated with ABX, resolved.\par No menstrual bleeding since starting Zoladex.\par Will be seeing Dr Suggs.\par \par 9/9/21\par pt is here for follow up.\par She is feeling well.\par No urinary symptoms. denies fever, mouth sores, N, V, D\par \par 9/21/21\par Pt is here for follow up.\par She is feeling well. She offers no new complaints.\par No urinary symptoms. denies fever, mouth sores, N, V, D\par CBC today shows wbc=2.95, ANC=1.56, Hgb=9.5, Kccw=620.\par \par 10/5/21\par Pt is here for follow up.\par C/O aches and pains since being on Taxol.\par No fever.\par \par 12/2/21\par Pt is here for follow up.\par C/O dizziness. It seems to be positional, improves with Meclizine.\par She saw her cardio and metoprolol was continued.\par Has mild neuropathy.\par Radiation has started this week.\par C/O insomnia\par \par 2/17/22\par Pt is here for follow up.\par Since last visit she has completed RT and also underwent TLH/BSO without any complications.\par She had her port removed as well\par Started Arimidex recently. C/O joint aches.\par Concerned about thyroid nodule, gall bladder polyp and osteopenia\par \par

## 2022-02-18 NOTE — ASSESSMENT
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 \par \par \par PT IS S/P ADJUVANT CHEMO WITH AC-T completed in 11/21,\par S/P RT in 1/22\par S/P TLH/BSO in 1/22\par \par Started Anastrazole last week of Jan 2022\par \par \par # Thyroid nodule: was assessed with USG. Repeat USG in 1 year 8/22\par \par # Gall bladder polyp : saw GI  already. Monitor closely, RPT USG in 9/22\par \par \par \par RECOMMENDATIONS:\par  I have prepared the note after I reviewed the previous notes, reviewed the radiological and laboratory studies and have communicated those to the patient\par \par \par Continue treatment with an aromatase inhibitor, Arimidex 1mg daily for at least 5 years likely longer.\par \par The side effects from such treatment were discussed in detail including but not limited to hot flashes, weight gain, hair thinning, arthralgia, myalgia, bone loss, increased risk of osteoporotic fractures and thrombo-embolism.\par \par RESULTs of DEXA scan ( 12/21) d/w Osteopenia\par  DEXA will be monitored every 2 years. She will take Ca + VIt D daily while on AI. Advised wt bearing exercises.\par \par \par May use melatonin for insomnia\par \par Pt advised on healthy life style and exercise\par All of her questions and concerns were addressed.\par \par Due to COVID 19, pre-visit patient instructions were explained to the patient and their symptoms were checked upon arrival. \par Masks were used by the health care providers and staff and the examination room was cleaned before and after the patient visit was completed.\par \par RTC in 4M\par \par

## 2022-02-18 NOTE — CONSULT LETTER
[Dear  ___] : Dear  [unfilled], [Consult Letter:] : I had the pleasure of evaluating your patient, [unfilled]. [Please see my note below.] : Please see my note below. [Consult Closing:] : Thank you very much for allowing me to participate in the care of this patient.  If you have any questions, please do not hesitate to contact me. [Sincerely,] : Sincerely, [DrMele  ___] : Dr. 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

## 2022-02-24 ENCOUNTER — APPOINTMENT (OUTPATIENT)
Dept: INFUSION THERAPY | Facility: CLINIC | Age: 47
End: 2022-02-24

## 2022-02-28 ENCOUNTER — NON-APPOINTMENT (OUTPATIENT)
Age: 47
End: 2022-02-28

## 2022-03-01 ENCOUNTER — APPOINTMENT (OUTPATIENT)
Dept: GYNECOLOGIC ONCOLOGY | Facility: CLINIC | Age: 47
End: 2022-03-01
Payer: COMMERCIAL

## 2022-03-01 VITALS
TEMPERATURE: 97.5 F | BODY MASS INDEX: 21.99 KG/M2 | WEIGHT: 132 LBS | HEIGHT: 65 IN | SYSTOLIC BLOOD PRESSURE: 118 MMHG | DIASTOLIC BLOOD PRESSURE: 74 MMHG

## 2022-03-01 PROCEDURE — 99024 POSTOP FOLLOW-UP VISIT: CPT

## 2022-03-01 NOTE — DISCUSSION/SUMMARY
[Clean] : was clean [Dry] : was dry [Intact] : was intact [Erythema] : was not erythematous [Ecchymosis] : was not ecchymotic [Seroma] : had no seroma [None] : had no drainage [Serous] : had serous drainage [Normal Skin] : normal appearance [Firm] : soft [Tender] : nontender [Abnormal Bowel Sounds] : normal bowel sounds [Rebound] : no rebound tenderness [Guarding] : no guarding [Incisional Hernia] : no incisional hernia [Mass] : no palpable mass [External Genitalia Abnormal] : normal external genitalia [Vaginal Exam Abnormal] : normal vaginal exam [Doing Well] : is doing well

## 2022-03-08 ENCOUNTER — APPOINTMENT (OUTPATIENT)
Age: 47
End: 2022-03-08
Payer: COMMERCIAL

## 2022-03-08 VITALS
WEIGHT: 135 LBS | SYSTOLIC BLOOD PRESSURE: 112 MMHG | DIASTOLIC BLOOD PRESSURE: 76 MMHG | HEART RATE: 85 BPM | RESPIRATION RATE: 14 BRPM | OXYGEN SATURATION: 99 % | BODY MASS INDEX: 22.49 KG/M2 | HEIGHT: 65 IN

## 2022-03-08 DIAGNOSIS — Z80.1 FAMILY HISTORY OF MALIGNANT NEOPLASM OF TRACHEA, BRONCHUS AND LUNG: ICD-10-CM

## 2022-03-08 DIAGNOSIS — Z80.49 FAMILY HISTORY OF MALIGNANT NEOPLASM OF OTHER GENITAL ORGANS: ICD-10-CM

## 2022-03-08 DIAGNOSIS — Z86.79 PERSONAL HISTORY OF OTHER DISEASES OF THE CIRCULATORY SYSTEM: ICD-10-CM

## 2022-03-08 DIAGNOSIS — Z87.891 PERSONAL HISTORY OF NICOTINE DEPENDENCE: ICD-10-CM

## 2022-03-08 PROCEDURE — 99203 OFFICE O/P NEW LOW 30 MIN: CPT

## 2022-03-08 RX ORDER — AMLODIPINE BESYLATE 5 MG/1
5 TABLET ORAL
Refills: 0 | Status: COMPLETED | COMMUNITY
End: 2022-03-08

## 2022-03-08 NOTE — DISCUSSION/SUMMARY
[FreeTextEntry1] : LUNG NODULES IN A PATIENT WITH HX OF BREAST C SP BL MASTECTOMY/ CHEMO\par FAMILY HX OF LUNG CANCER/ ASBESTOSIS\par REPEAT CHEST CT\par HEMOC REVIEWED

## 2022-03-08 NOTE — HISTORY OF PRESENT ILLNESS
[TextBox_4] : 46 YEARS OLD PRESENTED FOR ABOVE, R BREAST CA SP BL MASTECTOMY. CHEMO, RT, CHEST CT 5.21 LUNG NODULE, REFERRED, NON SMOKER, DENIES SOB, PRIOR HX OF LUNG DISEASE, SP HYSTERECTOMY 6 WEEKS AGO

## 2022-03-22 ENCOUNTER — RESULT REVIEW (OUTPATIENT)
Age: 47
End: 2022-03-22

## 2022-03-22 ENCOUNTER — OUTPATIENT (OUTPATIENT)
Dept: OUTPATIENT SERVICES | Facility: HOSPITAL | Age: 47
LOS: 1 days | Discharge: HOME | End: 2022-03-22
Payer: COMMERCIAL

## 2022-03-22 DIAGNOSIS — R91.1 SOLITARY PULMONARY NODULE: ICD-10-CM

## 2022-03-22 DIAGNOSIS — Z90.49 ACQUIRED ABSENCE OF OTHER SPECIFIED PARTS OF DIGESTIVE TRACT: Chronic | ICD-10-CM

## 2022-03-22 DIAGNOSIS — Z98.890 OTHER SPECIFIED POSTPROCEDURAL STATES: Chronic | ICD-10-CM

## 2022-03-22 DIAGNOSIS — Z90.13 ACQUIRED ABSENCE OF BILATERAL BREASTS AND NIPPLES: Chronic | ICD-10-CM

## 2022-03-22 DIAGNOSIS — M67.40 GANGLION, UNSPECIFIED SITE: Chronic | ICD-10-CM

## 2022-03-22 PROCEDURE — 71250 CT THORAX DX C-: CPT | Mod: 26

## 2022-03-29 ENCOUNTER — LABORATORY RESULT (OUTPATIENT)
Age: 47
End: 2022-03-29

## 2022-04-05 ENCOUNTER — APPOINTMENT (OUTPATIENT)
Dept: GYNECOLOGIC ONCOLOGY | Facility: CLINIC | Age: 47
End: 2022-04-05
Payer: COMMERCIAL

## 2022-04-05 VITALS
HEIGHT: 65 IN | WEIGHT: 135 LBS | BODY MASS INDEX: 22.49 KG/M2 | TEMPERATURE: 97.7 F | DIASTOLIC BLOOD PRESSURE: 73 MMHG | SYSTOLIC BLOOD PRESSURE: 122 MMHG

## 2022-04-05 PROCEDURE — 99024 POSTOP FOLLOW-UP VISIT: CPT

## 2022-04-05 NOTE — DISCUSSION/SUMMARY
[Cancer Type / Location / Histology Subtype: ________] : Cancer Type / Location / Histology Subtype: [unfilled] [Diagnosis Date (year): ____] : Diagnosis Date (year): [unfilled] [II] : II [Surgery] : Surgery: Yes [Surgery Date(s) (year): ____] : Surgery Date(s) (year): [unfilled] [Surgical Procedure / Location / Findings: _________] : Surgical Procedure / Location / Findings: [unfilled] [Radiation] : Radiation: Yes [Body Area Treated: _________] : Body Area Treated: [unfilled] [End Date (year): ____] : End Date (year): [unfilled] [Systemic Therapy (chemotherapy, hormonal therapy, other)] : Systemic Therapy (chemotherapy, hormonal therapy, other): Yes [Need for onging (adjuvant) treatment for cancer?] : Need for onging (adjuvant) treatment for cancer? Yes [Follow up with Oncologist in _____] : Follow up with Oncologist in [unfilled] [Follow up with Surgeon in _____] : Follow up with Surgeon in [unfilled] [Follow up with Radiation MD in _____] : Follow up with Radiation MD in [unfilled] [Primary care physician] : primary care physician [Colonoscopy] : colonoscopy [Bone Density Test] : bone density test [Cardiac Toxicity] : cardiac toxicity [Emotional and mental health] : Emotional and mental health [Bridge to Survivorship Breast Cancer] : Bridge to Survivorship Breast Cancer [FreeTextEntry1] : A/C followed by Taxol - completed 11/2021. [FreeTextEntry2] : Anastrozole.  [FreeTextEntry8] : Sherron Cline

## 2022-04-05 NOTE — DISCUSSION/SUMMARY
[Clean] : was clean [Dry] : was dry [Intact] : was intact [None] : had no drainage [Serous] : had serous drainage [Normal Skin] : normal appearance [Doing Well] : is doing well [Erythema] : was not erythematous [Ecchymosis] : was not ecchymotic [Seroma] : had no seroma [Firm] : soft [Tender] : nontender [Abnormal Bowel Sounds] : normal bowel sounds [Rebound] : no rebound tenderness [Guarding] : no guarding [Incisional Hernia] : no incisional hernia [Mass] : no palpable mass [External Genitalia Abnormal] : normal external genitalia [Vaginal Exam Abnormal] : normal vaginal exam

## 2022-04-05 NOTE — ASSESSMENT
[FreeTextEntry1] : 46 year old patient of Dr. Little,  (spont abx1) with newly diagnosed Breast Cancer s/p risk reducing surgery, with TLH/BSO 2022. She presents for vaginal cuff check. Path benign. She appears to have recovered well.\par \par Surgical Pathology Report \par Specimen(s) Submitted\par Uterus, cervix, bilateral tubes and ovaries\par \par Final Diagnosis\par Uterus, cervix, bilateral tubes and ovaries; total laparoscopic\par hysterectomy and BSO:\par - Benign leiomyoma.\par - The myometrium also showing superficial foci of adenomyosis.\par \par - Benign endometrial polyp and inactive endometrium with cystic atrophy,\par no hyperplasia or atypia seen.\par - Cervix showing chronic cervicitis with Nabothian cysts.\par - Benign ovary showing corpus albicans and few benign glandular\par inclusions, no neoplasm identified.\par - Benign fallopian tubes with few paratubal simple cysts.\par \par Note: There is no histologic evidence of metastatic carcinoma identified\par in this specimen. Clinical correlation is recommended. The patient's\par prior specimen report ([id number] was noticed.\par Verified by: Cristofer Fleming M.D.\par (Electronic Signature)\par Reported on: 22 18:14 EST, [facility],\par [address] [place] [id number]\par Phone: [phone] Fax: [phone]. \par \par

## 2022-05-16 ENCOUNTER — APPOINTMENT (OUTPATIENT)
Dept: PLASTIC SURGERY | Facility: CLINIC | Age: 47
End: 2022-05-16
Payer: COMMERCIAL

## 2022-05-16 PROCEDURE — 99213 OFFICE O/P EST LOW 20 MIN: CPT

## 2022-05-16 NOTE — ASSESSMENT
[FreeTextEntry1] : 45 yo F with right breast cancer s/p BL SSM, Rt axillary LN dissection (Dr. Macias) with immediate prepectoral TE reconstruction (filled to 100cc) with AlloDerm (6/15/21). Doing very well. \par s/p XRT right breast (completed 1/4/22)\par \par Tolerating serial TE overfill\par Right total 310 cc (desired volume for bilateral breasts)\par Doing well. \par \par Reasonable option for tissue implant exchange to permanent silicone implant of bilateral breasts.\par \par -Regarding implant exchange, the benefits, risks, and outcomes of implant surgery discussed.  The risks included but are not limited to bleeding, infection, seroma, hematoma, paraesthesia, poor wound healing, implant failure (infection, extrusion, rupture), implant malposition, capsular contracture, asymmetry, small risk of breast-implant associated lymphoma, possible need for additional planned or unplanned surgery including revision and/or autologous tissue reconstruction (e.g. pedicled latissimus dorsi flap, TDAP flap, etc), and dissatisfaction with outcome.  We also discussed the likelihood of additional surgery on the breast over the course of her lifetime.\par -Reviewed increased risk of capsular contracture with XRT history.  She understands LYNDA flap breast reconstruction is an option in the future.\par -All questions were answered to the patient's apparent satisfaction, and informed consent was obtained.\par Close FDA patient checklist reviewed with patient in detail.  All questions were answered. \par -will schedule for ALEXANDER procedure\par - c/w sports bra\par - sleep supine\par - pt knows to call for fevers, chills, breast redness and swelling.\par - F/u 2-3 months for review of FDA checklist\par \par Photos were taken with patient permission.\par \par Due to COVID-19, pre-visit patient instructions were explained to the patient and their symptoms were checked upon arrival. Masks were used by the healthcare provider and staff and the examination room was cleaned after the patient visit concluded\par \par \par

## 2022-05-16 NOTE — PHYSICAL EXAM
[de-identified] : well-appearing, NAD [de-identified] : Mild scoliosis\par Prominent right costal chest vs left [de-identified] : Bilateral breasts soft, mastectomy flaps healthy, viable and well perfused with excellent cap refill, incisions healing well, c/d/i, no erythema or tissue ischemia, tissue expanders in good position, no fluid collection  mild right breast XRT-realted fibrosis

## 2022-05-16 NOTE — HISTORY OF PRESENT ILLNESS
[FreeTextEntry1] : 44 yo   F with PMHx of anxiety/depression, HTN, GERD, asthma who was recently dx with RIGHT invasive ductal carcinoma and DCIS 2.4 cm @ 10:00 (ER+/MS+/HER2(-)) after palpable mass in right breast.  Denies breast pain, nipple discharge and retraction.  Here with friend, Rukhsana.\par \par Patient is learning toward bilateral mastectomy.  She is inquiring about immediate pre-pec implant based reconstruction.  She would like simplest recovery. 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  She would like a full B in reconstructive volume.\par Recent lost weight 8 lbs. currently 118 lbs.  Mother passed away recently 2 months ago.\par \par Interval hx (21). Patient presents today POD#8 s/p BL SSM, Rt axillary dissection with immediate prepectoral TE reconstruction (filled to 100cc) with AlloDerm. Doing very well with adequate pain management. Taking oral antibiotics as prescribed. Denies any f/c or bleeding. Drains all functional with appropriate output. Rt axillary drain with minimal 5-7 cc daily. \par \par Interval hx (21). Patient presents today POD#13 s/p BL SSM, Rt axillary dissection with immediate prepectoral TE reconstruction (filled to 100cc) with AlloDerm. Doing well with no new concerns. Denies any f/c or bleeding. Drains functional with decreasing output as expected. \par \par Interval hx (21): Pt presents today POD #17 s/p BL SSM, Rt axillary dissection with immediate prepectoral TE reconstruction (filled to 100cc) with AlloDerm. Feeling well after first TE fill. Remaining right drain output: . Denies f/c, redness, swelling, CP/SOB, N/V, or calf pain.\par \par Interval hx (21): Pt presents today POD #22 s/p BL SSM, Rt axillary dissection with immediate prepectoral TE reconstruction (filled to 100cc) with AlloDerm. Saw Dr. Macias  who discussed CTX x12 weeks and recommended RT consult given 3 positive LNs. \par \par Interval hx (21): Pt presents today POD #29 s/p BL SSM, Rt axillary dissection with immediate prepectoral TE reconstruction (filled to 100cc) with AlloDerm. Continues to do well with no significant discomfort. Had chemoport placed on Monday and scheduled to commence CTX later this week. Denies any f/c, bleeding or pressure after TE fills. \par \par Interval hx (21):  Here POD#41 s/p BL immediate prepec TE/Alloderm.  Started on chemotherapy, ; next cycle on .  Pt reports she will need RIGHT BREAST XRT.  Here for serial TE fill.   Denies fevers, chills, redness, swelling.\par \par Interval hx (21):  Here for POD#56 s/p BL prepec TE/Alloderm.  s/p 2 cycles of chemo. s/p chemoport.\par \par Interval hx (21):  Pt presents today as instructed by her oncologist for evaluation of left medial breast "bubble". Denies any pain, f/c, skin changes, open wounds or drainage. Tolerating chemotx with no significant complaints. \par \par Interval hx (21). Patient presents today for left TE overfill. Doing well with no new complaints. Tolerating chemotx with alopecia as expected, otherwise no significant adverse effects. \par \par Interval hx (21): Pt presents today for follow up. After [facility] planning, Dr. Shah advised to have left TE delfated for XRT field optimization for right XRT.  no new health issues; completed chemotherapy 3 weeks ago.  no fevers, chills, redness, swelling.\par \par Interval hx (11/15/21):  Went to radiation simulation today.  Left breast still within field and will require additional TE deflation for XRT.  No other complaints\par \par Interval hx (22): Pt presents today to refill left TE as she has completed right breast XRT as of 22. Tolerated XRT very well and applying moisturizer 3x/day.\par \par Interval hx (22): Pt presents today for left TE fill. Doing well and happy with right breast TE vol. Scheduling PAUL/BSO with Dr. Suggs next month. \par \par Interval hx (22): Here today for post-XRT breast exam; completed XRT 22.  she is also s/p lap PAUL (22), IR removed chemo-port 2/10/22.  She is inquiring about possible additional TE fill/upsize.\par \par Interval hx (22): Pt presents today for f/u, completed XRT 22 and is s/p lap PAUL (22), IR removed chemo-port 2/10/22.  Reports that right breast only feels tight during yoga.  She is here to discuss reconstructive options for next stage.  She expressed wish for implant-based reconstruction trial before undergoing LYNDA flap in the future.\par

## 2022-05-19 ENCOUNTER — APPOINTMENT (OUTPATIENT)
Dept: BREAST CENTER | Facility: CLINIC | Age: 47
End: 2022-05-19
Payer: COMMERCIAL

## 2022-05-19 ENCOUNTER — APPOINTMENT (OUTPATIENT)
Dept: CARDIOLOGY | Facility: CLINIC | Age: 47
End: 2022-05-19
Payer: COMMERCIAL

## 2022-05-19 VITALS
TEMPERATURE: 97.3 F | BODY MASS INDEX: 22.66 KG/M2 | DIASTOLIC BLOOD PRESSURE: 76 MMHG | HEIGHT: 65 IN | WEIGHT: 136 LBS | SYSTOLIC BLOOD PRESSURE: 110 MMHG | HEART RATE: 86 BPM

## 2022-05-19 VITALS
BODY MASS INDEX: 22.33 KG/M2 | WEIGHT: 134 LBS | SYSTOLIC BLOOD PRESSURE: 131 MMHG | HEIGHT: 65 IN | DIASTOLIC BLOOD PRESSURE: 88 MMHG | TEMPERATURE: 97.2 F

## 2022-05-19 DIAGNOSIS — G62.9 POLYNEUROPATHY, UNSPECIFIED: ICD-10-CM

## 2022-05-19 DIAGNOSIS — N63.10 UNSPECIFIED LUMP IN THE RIGHT BREAST, UNSPECIFIED QUADRANT: ICD-10-CM

## 2022-05-19 PROCEDURE — 99214 OFFICE O/P EST MOD 30 MIN: CPT

## 2022-05-19 PROCEDURE — 99212 OFFICE O/P EST SF 10 MIN: CPT

## 2022-05-19 PROCEDURE — 93000 ELECTROCARDIOGRAM COMPLETE: CPT

## 2022-05-19 PROCEDURE — 93702 BIS XTRACELL FLUID ANALYSIS: CPT | Mod: NC

## 2022-05-19 RX ORDER — SERTRALINE HYDROCHLORIDE 25 MG/1
25 TABLET, FILM COATED ORAL DAILY
Refills: 0 | Status: ACTIVE | COMMUNITY

## 2022-05-19 NOTE — HISTORY OF PRESENT ILLNESS
[FreeTextEntry1] : Brenda is a 46 year old premenopausal woman with pathologic Stage IIB T2N1M0 ER/ME (+), HER2 (-) IDC of right breast.\par \par s/p US Guided Core Bx - 04/29/2021: (stop-light)\par Right, 10:00 N4, 2.4cm:\par - Invasive poorly differentiated ductal carcinoma with scattered single cell necrosis.\par - Ductal carcinoma in-situ (DCIS), solid type with single cell necrosis, high nuclear grade.\par ER  (+)  95%\par ME  (+)  50%\par HER2  (-)  1.2\par Ki-67    30%\par \par Right, 10:00 N11, 0.4cm: (mini-cork)\par - Benign fibrofatty breast tissue and skeletal muscle with dominant macrocyst wall fragments associated with an attenuated epithelial lining and reactive wall fibrosis consistent with a dilated duct.\par \par \par HISTORICAL RISK FACTORS:\par (-) family hx - breast / ovarian cancer.\par PCP: Dr. Elena Yuan\par Genetic testing did not reveal any mutations. \par \par s/p BL SSM, SNB and Right AND with immediate prepectoral TE reconstruction (Dr. Gallegos) - 06/15/2021 = \par Right (3/25); healing prior bx site in the UOQ with invasive poorly differentiated ductal carcinoma, 3.5 cm, associated with focal necrosis. Non-extensive DCIS, high nuclear grade. Numerous foci of lymphovascular invasion are seen. Focal atypical lobular hyperplasia (ALH).\par Left (0/1); focal atypical lobular hyperplasia (ALH). Fibrofatty breast tissue with proliferative type fibrocystic\par changes.\par \par Dr. Wolfgang Little - (+) chemo\par Completed Adriamycin / Cytoxan every 2 weeks x 4 followed by Taxol every 2 weeks x 4.\par Started on Zoladex for ovarian suppression. \par \par Dr. Elin Shah - currently in planning / sim phase for RT treatment; she had to have left TE deflated due to the fact that it was in the field of radiation. \par \par INTERVAL HISTORY 5/19/22\par Brenda is here for her SIX MONTHS FOLLOW UP VISIT\par \par Since last visit she has completed RT 1/22 and also underwent TLH/BSO without any complications.\par She had her port removed as well  2/10/22\par Started Arimidex recently. C/O joint aches.\par \par NAVDEEP L-Dex Score: right arm -3.7\par Date: 5/19/22\par \par

## 2022-05-19 NOTE — CARDIOLOGY SUMMARY
[de-identified] : 5- NSR Normal ECG . \par 11- NSR Normal ECG \par 5- NSR Normal ECG  [de-identified] : 12-7-2021 24 hour ambulatory BP monitor . Mild systolic HTN significant diastolic hypertension . <10% night time dip  [de-identified] : 6-9-2021 ETT Echo Completed 7 minutes and 31 seconds . No echo ischemia .abnrmal ECG response  [de-identified] : 8- Normal Lv systolic function Trace MR trace TR

## 2022-05-19 NOTE — HISTORY OF PRESENT ILLNESS
[FreeTextEntry1] : The patient has right breast CA Stage II . The patient has had chemo and RT . . The patient had bilateral mastectomy and has breast expanders and is waiting on surgery for later this year . She had TAHBSO without complications. Her HR is in the 80's despite Metoprolol.

## 2022-05-19 NOTE — PHYSICAL EXAM
[Normocephalic] : normocephalic [Atraumatic] : atraumatic [EOMI] : extra ocular movement intact [Examined in the supine and seated position] : examined in the supine and seated position [Symmetrical] : symmetrical [No dominant masses] : no dominant masses in right breast  [No dominant masses] : no dominant masses left breast [No Axillary Lymphadenopathy] : no left axillary lymphadenopathy [No Edema] : no edema [No Rashes] : no rashes [No Ulceration] : no ulceration [de-identified] : SOZO L-Dex Score: right arm -3.7 Date: 5/19/22

## 2022-05-19 NOTE — ASSESSMENT
[FreeTextEntry1] : Patient is 46 year old premenopausal woman with pathologic Stage IIB T2N1M0 ER/OH (+), HER2 (-) IDC of right breast. \par She is s/p BL SSM, SNB and Right AND with immediate prepectoral TE reconstruction (Dr. Gallegos) - 06/15/2021.\par \par Dr. Wolfgang Little - (+) chemo\par Completed Adriamycin / Cytoxan every 2 weeks x 4 followed by Taxol every 2 weeks x 4.\par Started on Zoladex for ovarian suppression. \par she has completed RT 1/22 and also underwent TLH/BSO without any complications.\par She had her port removed as well  2/10/22\par Started Arimidex recently. C/O joint aches.\par \par \par All of her questions and concerns were addressed.\par \par Plan: \par - Follow-up for breast exam in 6 months.\par - Continue follow-up with Medical Oncology.\par - Continue follow-up with Plastic Surgery as scheduled.\par - SOZO L-Dex Score: right arm -3.7\par    Date: 5/19/22\par -referral to occupational therapy \par \par \par

## 2022-05-19 NOTE — ASSESSMENT
[FreeTextEntry1] : The patient has had bilateral mastectomies and breast exampnders and will undergoe  implants later in the year . She has had TAHBSO without complication. The patient has not had CP or SOB . She did have adriamycin as chemo .

## 2022-06-15 ENCOUNTER — NON-APPOINTMENT (OUTPATIENT)
Age: 47
End: 2022-06-15

## 2022-06-16 ENCOUNTER — APPOINTMENT (OUTPATIENT)
Dept: HEMATOLOGY ONCOLOGY | Facility: CLINIC | Age: 47
End: 2022-06-16
Payer: COMMERCIAL

## 2022-06-16 ENCOUNTER — OUTPATIENT (OUTPATIENT)
Dept: OUTPATIENT SERVICES | Facility: HOSPITAL | Age: 47
LOS: 1 days | Discharge: HOME | End: 2022-06-16

## 2022-06-16 VITALS
SYSTOLIC BLOOD PRESSURE: 127 MMHG | TEMPERATURE: 98.3 F | BODY MASS INDEX: 22.66 KG/M2 | WEIGHT: 136 LBS | HEART RATE: 65 BPM | DIASTOLIC BLOOD PRESSURE: 82 MMHG | HEIGHT: 65 IN

## 2022-06-16 DIAGNOSIS — Z98.890 OTHER SPECIFIED POSTPROCEDURAL STATES: Chronic | ICD-10-CM

## 2022-06-16 DIAGNOSIS — M67.40 GANGLION, UNSPECIFIED SITE: Chronic | ICD-10-CM

## 2022-06-16 DIAGNOSIS — Z90.13 ACQUIRED ABSENCE OF BILATERAL BREASTS AND NIPPLES: Chronic | ICD-10-CM

## 2022-06-16 DIAGNOSIS — Z90.49 ACQUIRED ABSENCE OF OTHER SPECIFIED PARTS OF DIGESTIVE TRACT: Chronic | ICD-10-CM

## 2022-06-16 PROCEDURE — 99214 OFFICE O/P EST MOD 30 MIN: CPT

## 2022-06-19 NOTE — CONSULT LETTER
[Dear  ___] : Dear  [unfilled], [Consult Letter:] : I had the pleasure of evaluating your patient, [unfilled]. [Please see my note below.] : Please see my note below. [Consult Closing:] : Thank you very much for allowing me to participate in the care of this patient.  If you have any questions, please do not hesitate to contact me. [Sincerely,] : Sincerely, [DrMele  ___] : Dr. 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

## 2022-06-19 NOTE — PHYSICAL EXAM
[Fully active, able to carry on all pre-disease performance without restriction] : Status 0 - Fully active, able to carry on all pre-disease performance without restriction [Normal] : affect appropriate [de-identified] : B/L mastectomy with expanders in place

## 2022-06-19 NOTE — HISTORY OF PRESENT ILLNESS
[de-identified] : This is a 45 premenopausal woman being seen for wt loss.\par Pt has Lost 15 lbs in the last 6 mths. \par She has a good appetite.\par C/o having drenching  night sweats more often.\par She is known to have enlarged  neck Lns, saw ENt however could not get FNA as the nodes were too small.\par Had recent change in bowel habits over the last month or so with constipation and diarrhea. She has a follow up with GI.\par Has heavy periods, has fibroids, will see GYN \par She has an enlarged LNs in the groin\par colonoscopy done in 12/2/19 showed 7mm ascending colon polyp.\par EGD non erosive gastritis [de-identified] : ONCOTYPE DX RS 32.\par RR at 7 yrs 25%.\par With chemo benefit from chemo 15% [de-identified] : 5/11/21\par Pt is here for follow up.\par She is here to discuss results and further plan of care.\par She has completed breast biopsy.\par She has surgery appt today.\par She also has a GI appt later in the week\par \par 6/28/21\par Pt is here for follow up.\par She is s/p B/L mastectomy with TE insertion.\par There were no post op complications.\par She still has a drain in place.\par She is here to discuss adjuvant therapy.\par Pt still is menstruating.\par \par 7/29/21\par pt is here for follow up.\par She is s/p 1 cycle of AC. She tolerated it well. No N, V, D, mouth sores, fever.\par Has gastric reflux.\par Had bleeding post Zoladex injection\par \par 8/12/2021\par Patient is here to follow up for breast cancer\par She is s/p cycle #2 Adriamycin/Cytoxan, tolerating well\par She is also on Zoladex\par She feels well today, appetite is good\par She denies nausea, vomiting, or any chest wall concerns\par She c/o of mild spasm after voiding even after completing Cipro x 5days\par \par 8/25/21\par Pt is here for follow up.\par Tolerating chemo with some SE. Nausea controlled with Zofran. Had UTI few weeks ago, treated with ABX, resolved.\par No menstrual bleeding since starting Zoladex.\par Will be seeing Dr Suggs.\par \par 9/9/21\par pt is here for follow up.\par She is feeling well.\par No urinary symptoms. denies fever, mouth sores, N, V, D\par \par 9/21/21\par Pt is here for follow up.\par She is feeling well. She offers no new complaints.\par No urinary symptoms. denies fever, mouth sores, N, V, D\par CBC today shows wbc=2.95, ANC=1.56, Hgb=9.5, Ubgz=875.\par \par 10/5/21\par Pt is here for follow up.\par C/O aches and pains since being on Taxol.\par No fever.\par \par 12/2/21\par Pt is here for follow up.\par C/O dizziness. It seems to be positional, improves with Meclizine.\par She saw her cardio and metoprolol was continued.\par Has mild neuropathy.\par Radiation has started this week.\par C/O insomnia\par \par 2/17/22\par Pt is here for follow up.\par Since last visit she has completed RT and also underwent TLH/BSO without any complications.\par She had her port removed as well\par Started Arimidex recently. C/O joint aches.\par Concerned about thyroid nodule, gall bladder polyp and osteopenia\par \par 6/16/22\par  Patient here for follow up, feeling well.  She denies any new complaints.  Patient denies any new palpable breast lumps or pain, denies skin changes, denies nipple discharge.  Patient denies cough, shortness of breath, denies fever, denies bone pain.\par Compliant with Arimidex.\par Taking ca + D.\par had labs done recently, reviewed with pt\par \par \par

## 2022-06-19 NOTE — ASSESSMENT
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 \par \par \par PT IS S/P ADJUVANT CHEMO WITH AC-T completed in 11/21,\par S/P RT in 1/22\par S/P TLH/BSO in 1/22\par \par Started Anastrazole last week of Jan 2022\par \par \par # Thyroid nodule: was assessed with USG. Repeat USG in 1 year 8/22\par \par # Gall bladder polyp : saw GI  already. Monitor closely, RPT USG in 9/22\par \par \par \par RECOMMENDATIONS:\par  I have prepared the note after I reviewed the previous notes, reviewed the radiological and laboratory studies and have communicated those to the patient\par \par \par Continue treatment with an aromatase inhibitor, Arimidex 1mg daily for at least 5 years likely longer.\par \par The side effects from such treatment were discussed in detail including but not limited to hot flashes, weight gain, hair thinning, arthralgia, myalgia, bone loss, increased risk of osteoporotic fractures and thrombo-embolism.\par \par RESULTs of DEXA scan ( 12/21) d/w Osteopenia\par  DEXA will be monitored every 2 years. She will take Ca + VIt D daily while on AI. Advised wt bearing exercises.\par \par \par Pt advised on healthy life style and exercise\par All of her questions and concerns were addressed.\par \par Due to COVID 19, pre-visit patient instructions were explained to the patient and their symptoms were checked upon arrival. \par Masks were used by the health care providers and staff and the examination room was cleaned before and after the patient visit was completed.\par \par RTC in 6M\par \par

## 2022-06-21 ENCOUNTER — RESULT REVIEW (OUTPATIENT)
Age: 47
End: 2022-06-21

## 2022-06-21 ENCOUNTER — OUTPATIENT (OUTPATIENT)
Dept: OUTPATIENT SERVICES | Facility: HOSPITAL | Age: 47
LOS: 1 days | Discharge: HOME | End: 2022-06-21
Payer: COMMERCIAL

## 2022-06-21 DIAGNOSIS — M67.40 GANGLION, UNSPECIFIED SITE: Chronic | ICD-10-CM

## 2022-06-21 DIAGNOSIS — F17.211 NICOTINE DEPENDENCE, CIGARETTES, IN REMISSION: ICD-10-CM

## 2022-06-21 DIAGNOSIS — Z90.13 ACQUIRED ABSENCE OF BILATERAL BREASTS AND NIPPLES: Chronic | ICD-10-CM

## 2022-06-21 DIAGNOSIS — Z87.891 PERSONAL HISTORY OF NICOTINE DEPENDENCE: ICD-10-CM

## 2022-06-21 DIAGNOSIS — Z98.890 OTHER SPECIFIED POSTPROCEDURAL STATES: Chronic | ICD-10-CM

## 2022-06-21 DIAGNOSIS — Z90.49 ACQUIRED ABSENCE OF OTHER SPECIFIED PARTS OF DIGESTIVE TRACT: Chronic | ICD-10-CM

## 2022-06-21 PROCEDURE — 71250 CT THORAX DX C-: CPT | Mod: 26

## 2022-06-30 DIAGNOSIS — Z79.811 LONG TERM (CURRENT) USE OF AROMATASE INHIBITORS: ICD-10-CM

## 2022-06-30 DIAGNOSIS — C50.411 MALIGNANT NEOPLASM OF UPPER-OUTER QUADRANT OF RIGHT FEMALE BREAST: ICD-10-CM

## 2022-07-25 ENCOUNTER — RX RENEWAL (OUTPATIENT)
Age: 47
End: 2022-07-25

## 2022-08-05 NOTE — ASSESSMENT
[FreeTextEntry1] : 46 year old premenopausal woman with pathologic stage IIB T2N1M0 HR+, HER2 negative IDC of right breast s/p port removal on 2/10 in IR presents today for post procedural follow up. \par \par Re-educated patient on procedure and anticipated healing time. Patient may get site wet with soap and water, washing gently with washcloth as needed. Advised against rubbing area or removing Dermabond as it will fall off on its own.  Educated patient on signs and symptoms of infection, told to contact us for any further questions or concerns.\par \par No further IR follow up or management needed.\par

## 2022-08-05 NOTE — HISTORY OF PRESENT ILLNESS
[FreeTextEntry1] : 46 year old premenopausal woman with pathologic stage IIB T2N1M0 HR+, HER2 negative IDC of right breast s/p port removal on 2/10 in IR presents today for post procedural follow up. \par \par Patient appears well, offers no complaints or concerns at this time. Patient states she noticed slight oozing of blood on removal of telfa dressing, but dressing itself was dry. Patient placed her own steri strips on the area for extra protection. It has remained dry since the initial incident. Patient denies fevers/chills, or N/V/D.\par \par On examination, port a cath site looks clean, dry and intact. No erythema or swelling present. No tenderness or drainage noted on manipulation. No neck pain, no limited ROM. Dermabond and steri strips on site and in tact. \par \par \par \par

## 2022-08-08 ENCOUNTER — APPOINTMENT (OUTPATIENT)
Dept: CARDIOLOGY | Facility: CLINIC | Age: 47
End: 2022-08-08

## 2022-08-08 ENCOUNTER — APPOINTMENT (OUTPATIENT)
Dept: PLASTIC SURGERY | Facility: CLINIC | Age: 47
End: 2022-08-08

## 2022-08-08 PROCEDURE — 93306 TTE W/DOPPLER COMPLETE: CPT

## 2022-08-08 PROCEDURE — 99213 OFFICE O/P EST LOW 20 MIN: CPT

## 2022-08-08 NOTE — PHYSICAL EXAM
[de-identified] : well-appearing, NAD [de-identified] : Mild scoliosis\par Prominent right costal chest vs left [de-identified] : Bilateral breasts soft, mastectomy flaps healthy, viable and well perfused with excellent cap refill, incisions healing well, c/d/i, no erythema or tissue ischemia, tissue expanders in good position, no fluid collection  mild right breast XRT-related fibrosis

## 2022-08-08 NOTE — HISTORY OF PRESENT ILLNESS
[FreeTextEntry1] : 46 yo   F with PMHx of anxiety/depression, HTN, GERD, asthma who was recently dx with RIGHT invasive ductal carcinoma and DCIS 2.4 cm @ 10:00 (ER+/SD+/HER2(-)) after palpable mass in right breast.  Denies breast pain, nipple discharge and retraction.  Here with friend, Rukhsana.\par \par Patient is learning toward bilateral mastectomy.  She is inquiring about immediate pre-pec implant based reconstruction.  She would like simplest recovery. 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  She would like a full B in reconstructive volume.\par Recent lost weight 8 lbs. currently 118 lbs.  Mother passed away recently 2 months ago.\par \par Interval hx (21). Patient presents today POD#8 s/p BL SSM, Rt axillary dissection with immediate prepectoral TE reconstruction (filled to 100cc) with AlloDerm. Doing very well with adequate pain management. Taking oral antibiotics as prescribed. Denies any f/c or bleeding. Drains all functional with appropriate output. Rt axillary drain with minimal 5-7 cc daily. \par \par Interval hx (21). Patient presents today POD#13 s/p BL SSM, Rt axillary dissection with immediate prepectoral TE reconstruction (filled to 100cc) with AlloDerm. Doing well with no new concerns. Denies any f/c or bleeding. Drains functional with decreasing output as expected. \par \par Interval hx (21): Pt presents today POD #17 s/p BL SSM, Rt axillary dissection with immediate prepectoral TE reconstruction (filled to 100cc) with AlloDerm. Feeling well after first TE fill. Remaining right drain output: . Denies f/c, redness, swelling, CP/SOB, N/V, or calf pain.\par \par Interval hx (21): Pt presents today POD #22 s/p BL SSM, Rt axillary dissection with immediate prepectoral TE reconstruction (filled to 100cc) with AlloDerm. Saw Dr. Macias  who discussed CTX x12 weeks and recommended RT consult given 3 positive LNs. \par \par Interval hx (21): Pt presents today POD #29 s/p BL SSM, Rt axillary dissection with immediate prepectoral TE reconstruction (filled to 100cc) with AlloDerm. Continues to do well with no significant discomfort. Had chemoport placed on Monday and scheduled to commence CTX later this week. Denies any f/c, bleeding or pressure after TE fills. \par \par Interval hx (21):  Here POD#41 s/p BL immediate prepec TE/Alloderm.  Started on chemotherapy, ; next cycle on .  Pt reports she will need RIGHT BREAST XRT.  Here for serial TE fill.   Denies fevers, chills, redness, swelling.\par \par Interval hx (21):  Here for POD#56 s/p BL prepec TE/Alloderm.  s/p 2 cycles of chemo. s/p chemoport.\par \par Interval hx (21):  Pt presents today as instructed by her oncologist for evaluation of left medial breast "bubble". Denies any pain, f/c, skin changes, open wounds or drainage. Tolerating chemotx with no significant complaints. \par \par Interval hx (21). Patient presents today for left TE overfill. Doing well with no new complaints. Tolerating chemotx with alopecia as expected, otherwise no significant adverse effects. \par \par Interval hx (21): Pt presents today for follow up. After [facility] planning, Dr. Shah advised to have left TE delfated for XRT field optimization for right XRT.  no new health issues; completed chemotherapy 3 weeks ago.  no fevers, chills, redness, swelling.\par \par Interval hx (11/15/21):  Went to radiation simulation today.  Left breast still within field and will require additional TE deflation for XRT.  No other complaints\par \par Interval hx (22): Pt presents today to refill left TE as she has completed right breast XRT as of 22. Tolerated XRT very well and applying moisturizer 3x/day.\par \par Interval hx (22): Pt presents today for left TE fill. Doing well and happy with right breast TE vol. Scheduling PAUL/BSO with Dr. Suggs next month. \par \par Interval hx (22): Here today for post-XRT breast exam; completed XRT 22.  she is also s/p lap PAUL (22), IR removed chemo-port 2/10/22.  She is inquiring about possible additional TE fill/upsize.\par \par Interval hx (22): Pt presents today for f/u, completed XRT 22 and is s/p lap PAUL (22), IR removed chemo-port 2/10/22.  Reports that right breast only feels tight during yoga.  She is here to discuss reconstructive options for next stage.  She expressed wish for implant-based reconstruction trial before undergoing LYNDA flap in the future.\par \par Interval hx (22): s/p BL pre-pec TE breast reconstruction (6/15/21), s/p XRT 22 and is s/p lap PAUL (22), IR removed chemo-port 2/10/22.  Now 7 months s/p XRT, here to discuss TE exchange to silicone implant.  She has been going to OT from right shoulder ROM 2/2 capsular contracture.

## 2022-08-08 NOTE — ASSESSMENT
[FreeTextEntry1] : 47 yo F with right breast cancer s/p BL SSM, Rt axillary LN dissection (Dr. Macias) with immediate prepectoral TE reconstruction (filled to 100cc) with AlloDerm (6/15/21). Doing very well. \par s/p XRT right breast (completed 1/4/22)\par \par Tolerating serial TE overfill\par Right total 310 cc (desired volume for bilateral breasts)\par Doing well. \par \par Excellent candidate to proceed tissue implant exchange to permanent silicone implant of bilateral breasts.\par \par -Regarding implant exchange, the benefits, risks, and outcomes of implant surgery discussed.  The risks included but are not limited to bleeding, infection, seroma, hematoma, paraesthesia, poor wound healing, implant failure (infection, extrusion, rupture), implant malposition, capsular contracture, asymmetry, small risk of breast-implant associated lymphoma, possible need for additional planned or unplanned surgery including revision and/or autologous tissue reconstruction (e.g. pedicled latissimus dorsi flap, TDAP flap, etc), and dissatisfaction with outcome.  We also discussed the likelihood of additional surgery on the breast over the course of her lifetime.\par -Reviewed increased risk of capsular contracture with XRT history.  She understands LYNDA flap breast reconstruction is an option in the future.\par -All questions were answered to the patient's apparent satisfaction, and informed consent was obtained.\par [employer] patient checklist reviewed with patient in detail.  All questions were answered. \par - c/w sports bra\par - sleep supine\par - pt knows to call for fevers, chills, breast redness and swelling.\par - Scheduled for 9/6/22\par \par Photos were taken with patient permission today\par \par Due to COVID-19, pre-visit patient instructions were explained to the patient and their symptoms were checked upon arrival. Masks were used by the healthcare provider and staff and the examination room was cleaned after the patient visit concluded\par \par \par

## 2022-08-15 ENCOUNTER — OUTPATIENT (OUTPATIENT)
Dept: OUTPATIENT SERVICES | Facility: HOSPITAL | Age: 47
LOS: 1 days | Discharge: HOME | End: 2022-08-15

## 2022-08-15 VITALS
DIASTOLIC BLOOD PRESSURE: 82 MMHG | WEIGHT: 125 LBS | HEART RATE: 68 BPM | SYSTOLIC BLOOD PRESSURE: 122 MMHG | TEMPERATURE: 99 F | OXYGEN SATURATION: 100 % | RESPIRATION RATE: 18 BRPM | HEIGHT: 66 IN

## 2022-08-15 DIAGNOSIS — Z85.3 PERSONAL HISTORY OF MALIGNANT NEOPLASM OF BREAST: ICD-10-CM

## 2022-08-15 DIAGNOSIS — Z90.13 ACQUIRED ABSENCE OF BILATERAL BREASTS AND NIPPLES: Chronic | ICD-10-CM

## 2022-08-15 DIAGNOSIS — Z98.890 OTHER SPECIFIED POSTPROCEDURAL STATES: Chronic | ICD-10-CM

## 2022-08-15 DIAGNOSIS — Z90.49 ACQUIRED ABSENCE OF OTHER SPECIFIED PARTS OF DIGESTIVE TRACT: Chronic | ICD-10-CM

## 2022-08-15 DIAGNOSIS — M67.40 GANGLION, UNSPECIFIED SITE: Chronic | ICD-10-CM

## 2022-08-15 DIAGNOSIS — Z01.818 ENCOUNTER FOR OTHER PREPROCEDURAL EXAMINATION: ICD-10-CM

## 2022-08-15 LAB
ALBUMIN SERPL ELPH-MCNC: 4.7 G/DL — SIGNIFICANT CHANGE UP (ref 3.5–5.2)
ALP SERPL-CCNC: 69 U/L — SIGNIFICANT CHANGE UP (ref 30–115)
ALT FLD-CCNC: 10 U/L — SIGNIFICANT CHANGE UP (ref 0–41)
ANION GAP SERPL CALC-SCNC: 13 MMOL/L — SIGNIFICANT CHANGE UP (ref 7–14)
APPEARANCE UR: CLEAR — SIGNIFICANT CHANGE UP
APTT BLD: 28.6 SEC — SIGNIFICANT CHANGE UP (ref 27–39.2)
AST SERPL-CCNC: 16 U/L — SIGNIFICANT CHANGE UP (ref 0–41)
BACTERIA # UR AUTO: NEGATIVE — SIGNIFICANT CHANGE UP
BASOPHILS # BLD AUTO: 0.07 K/UL — SIGNIFICANT CHANGE UP (ref 0–0.2)
BASOPHILS NFR BLD AUTO: 1.2 % — HIGH (ref 0–1)
BILIRUB SERPL-MCNC: 0.4 MG/DL — SIGNIFICANT CHANGE UP (ref 0.2–1.2)
BILIRUB UR-MCNC: NEGATIVE — SIGNIFICANT CHANGE UP
BUN SERPL-MCNC: 19 MG/DL — SIGNIFICANT CHANGE UP (ref 10–20)
CALCIUM SERPL-MCNC: 9.8 MG/DL — SIGNIFICANT CHANGE UP (ref 8.5–10.1)
CHLORIDE SERPL-SCNC: 104 MMOL/L — SIGNIFICANT CHANGE UP (ref 98–110)
CO2 SERPL-SCNC: 26 MMOL/L — SIGNIFICANT CHANGE UP (ref 17–32)
COLOR SPEC: YELLOW — SIGNIFICANT CHANGE UP
COMMENT - URINE: SIGNIFICANT CHANGE UP
CREAT SERPL-MCNC: 0.8 MG/DL — SIGNIFICANT CHANGE UP (ref 0.7–1.5)
DIFF PNL FLD: NEGATIVE — SIGNIFICANT CHANGE UP
EGFR: 90 ML/MIN/1.73M2 — SIGNIFICANT CHANGE UP
EOSINOPHIL # BLD AUTO: 0.17 K/UL — SIGNIFICANT CHANGE UP (ref 0–0.7)
EOSINOPHIL NFR BLD AUTO: 3 % — SIGNIFICANT CHANGE UP (ref 0–8)
EPI CELLS # UR: 1 /HPF — SIGNIFICANT CHANGE UP (ref 0–5)
GLUCOSE SERPL-MCNC: 92 MG/DL — SIGNIFICANT CHANGE UP (ref 70–99)
GLUCOSE UR QL: NEGATIVE — SIGNIFICANT CHANGE UP
HCT VFR BLD CALC: 37.2 % — SIGNIFICANT CHANGE UP (ref 37–47)
HGB BLD-MCNC: 12 G/DL — SIGNIFICANT CHANGE UP (ref 12–16)
HYALINE CASTS # UR AUTO: 1 /LPF — SIGNIFICANT CHANGE UP (ref 0–7)
IMM GRANULOCYTES NFR BLD AUTO: 0.2 % — SIGNIFICANT CHANGE UP (ref 0.1–0.3)
INR BLD: 0.94 RATIO — SIGNIFICANT CHANGE UP (ref 0.65–1.3)
KETONES UR-MCNC: NEGATIVE — SIGNIFICANT CHANGE UP
LEUKOCYTE ESTERASE UR-ACNC: ABNORMAL
LYMPHOCYTES # BLD AUTO: 1.03 K/UL — LOW (ref 1.2–3.4)
LYMPHOCYTES # BLD AUTO: 18 % — LOW (ref 20.5–51.1)
MCHC RBC-ENTMCNC: 31.6 PG — HIGH (ref 27–31)
MCHC RBC-ENTMCNC: 32.3 G/DL — SIGNIFICANT CHANGE UP (ref 32–37)
MCV RBC AUTO: 97.9 FL — SIGNIFICANT CHANGE UP (ref 81–99)
MONOCYTES # BLD AUTO: 0.47 K/UL — SIGNIFICANT CHANGE UP (ref 0.1–0.6)
MONOCYTES NFR BLD AUTO: 8.2 % — SIGNIFICANT CHANGE UP (ref 1.7–9.3)
NEUTROPHILS # BLD AUTO: 3.98 K/UL — SIGNIFICANT CHANGE UP (ref 1.4–6.5)
NEUTROPHILS NFR BLD AUTO: 69.4 % — SIGNIFICANT CHANGE UP (ref 42.2–75.2)
NITRITE UR-MCNC: NEGATIVE — SIGNIFICANT CHANGE UP
NRBC # BLD: 0 /100 WBCS — SIGNIFICANT CHANGE UP (ref 0–0)
PH UR: 6 — SIGNIFICANT CHANGE UP (ref 5–8)
PLATELET # BLD AUTO: 279 K/UL — SIGNIFICANT CHANGE UP (ref 130–400)
POTASSIUM SERPL-MCNC: 4.3 MMOL/L — SIGNIFICANT CHANGE UP (ref 3.5–5)
POTASSIUM SERPL-SCNC: 4.3 MMOL/L — SIGNIFICANT CHANGE UP (ref 3.5–5)
PROT SERPL-MCNC: 7.4 G/DL — SIGNIFICANT CHANGE UP (ref 6–8)
PROT UR-MCNC: SIGNIFICANT CHANGE UP
PROTHROM AB SERPL-ACNC: 10.8 SEC — SIGNIFICANT CHANGE UP (ref 9.95–12.87)
RBC # BLD: 3.8 M/UL — LOW (ref 4.2–5.4)
RBC # FLD: 13.5 % — SIGNIFICANT CHANGE UP (ref 11.5–14.5)
RBC CASTS # UR COMP ASSIST: 3 /HPF — SIGNIFICANT CHANGE UP (ref 0–4)
SODIUM SERPL-SCNC: 143 MMOL/L — SIGNIFICANT CHANGE UP (ref 135–146)
SP GR SPEC: 1.02 — SIGNIFICANT CHANGE UP (ref 1.01–1.03)
UROBILINOGEN FLD QL: SIGNIFICANT CHANGE UP
WBC # BLD: 5.73 K/UL — SIGNIFICANT CHANGE UP (ref 4.8–10.8)
WBC # FLD AUTO: 5.73 K/UL — SIGNIFICANT CHANGE UP (ref 4.8–10.8)
WBC UR QL: 2 /HPF — SIGNIFICANT CHANGE UP (ref 0–5)

## 2022-08-15 RX ORDER — METOPROLOL TARTRATE 50 MG
1 TABLET ORAL
Qty: 0 | Refills: 0 | DISCHARGE

## 2022-08-15 NOTE — H&P PST ADULT - PRIMARY CARE PROVIDER
pmd:MYKE([id number])  CARDIO:DANIELLA( HAS APPT 8/15 ) HAD ECHO LAST WEEK  PULM: CHANDLER(LV 3/18/22) pmd:MYKE([id number])  CARDIO:DANIELLA( HAS APPT 8/15 ) HAD ECHO LAST WEEK  PULM: EL-TRIP(LV 3/18/22) radiation onc: odalys(lv 8/22)  heme/onc: 6/22)

## 2022-08-15 NOTE — H&P PST ADULT - NSICDXPASTMEDICALHX_GEN_ALL_CORE_FT
PAST MEDICAL HISTORY:  Anxiety     Breast cancer     H/O thyroid nodule f/u with endo- 'insignificant at present'    HTN (hypertension)     Lung nodule ON CT

## 2022-08-15 NOTE — H&P PST ADULT - NSICDXFAMILYHX_GEN_ALL_CORE_FT
FAMILY HISTORY:  Father  Still living? Unknown  FH: HTN (hypertension), Age at diagnosis: Age Unknown  FH: lung cancer, Age at diagnosis: Age Unknown    Mother  Still living? No  Family history of cardiomyopathy, Age at diagnosis: Age Unknown

## 2022-08-15 NOTE — H&P PST ADULT - HISTORY OF PRESENT ILLNESS
Patient is a 47year old female presenting to PAST in preparation for BILATERAL BREAST TISSUE EXPANDER EXCHANGE TO SILICONE IMPLANTS, POSSIBLE CAPSULECTOMY on 9/6 under gen anesthesia by Dr. Gallegos  PATIENT CURRENTLY DENIES CHEST PAIN  SHORTNESS OF BREATH  PALPITATIONS,   OR UPPER RESPIRATORY INFECTION IN PAST 2 WEEKS  REPORTS MILD BURNING ON URINATION  EXERCISE  TOLERANCE 4 FLIGHT OF STAIRS  WITHOUT SHORTNESS OF BREATH    Anesthesia Alert  NO--Difficult Airway  NO--History of neck surgery or radiation  NO--Limited ROM of neck  NO--History of Malignant hyperthermia  NO--Personal or family history of Pseudocholinesterase deficiency  NO--Prior Anesthesia Complication  NO--Latex Allergy  NO--Loose teeth  NO--History of Rheumatoid Arthritis  NO--MOHSEN  NO-- BLEEDING RISK  yes--Other_____( RIGHT ARM PRECAUTIONS)    As per patient, this is their complete medical and surgical history, including medications both prescribed or over the counter.  Patient verbalized understanding of instructions and was given the opportunity to ask questions and have them answered.  Patient denies any signs or symptoms of COVID 19 and denies contact with known positive individuals.  They have an appointment for repeat COVID testing pre-procedure and acknowledge its time and place.  They were instructed to quarantine pre-procedure, practice exposure control measures, continue to self-monitor and report any concerns to their proceduralist.

## 2022-08-15 NOTE — H&P PST ADULT - NSICDXPASTSURGICALHX_GEN_ALL_CORE_FT
PAST SURGICAL HISTORY:  Ganglion cyst     H/O bilateral mastectomy expanders    H/O colonoscopy 2014    History of appendectomy     History of D&C     History of surgery PORT PLACEMENT   REMOVAL OD PORT

## 2022-08-16 ENCOUNTER — APPOINTMENT (OUTPATIENT)
Dept: CARDIOLOGY | Facility: CLINIC | Age: 47
End: 2022-08-16

## 2022-08-16 VITALS
DIASTOLIC BLOOD PRESSURE: 80 MMHG | SYSTOLIC BLOOD PRESSURE: 118 MMHG | WEIGHT: 128 LBS | BODY MASS INDEX: 21.33 KG/M2 | HEART RATE: 60 BPM | HEIGHT: 65 IN | TEMPERATURE: 96.8 F

## 2022-08-16 PROBLEM — C50.919 MALIGNANT NEOPLASM OF UNSPECIFIED SITE OF UNSPECIFIED FEMALE BREAST: Chronic | Status: ACTIVE | Noted: 2022-08-15

## 2022-08-16 LAB
CULTURE RESULTS: SIGNIFICANT CHANGE UP
SPECIMEN SOURCE: SIGNIFICANT CHANGE UP

## 2022-08-16 PROCEDURE — 93000 ELECTROCARDIOGRAM COMPLETE: CPT

## 2022-08-16 PROCEDURE — 99214 OFFICE O/P EST MOD 30 MIN: CPT | Mod: 25

## 2022-08-16 NOTE — CARDIOLOGY SUMMARY
[de-identified] : 5- NSR Normal ECG . \par 11- NSR Normal ECG \par 5- NSR Normal ECG  [de-identified] : 12-7-2021 24 hour ambulatory BP monitor . Mild systolic HTN significant diastolic hypertension . <10% night time dip  [de-identified] : 6-9-2021 ETT Echo Completed 7 minutes and 31 seconds . No echo ischemia .abnrmal ECG response  [de-identified] : 8- Normal Lv systolic function Trace MR trace TR \par 8-8-2022 Normal Lv systolic function Trace Mr mild TR

## 2022-08-16 NOTE — HISTORY OF PRESENT ILLNESS
[FreeTextEntry1] : The patient has right breast CA Stage II . The patient has had chemo and RT and this has been completed .  . . The patient had bilateral mastectomy and has breast expanders and is going for implants .  She had TAHBSO without complications.. She has had no chest pain or SOB

## 2022-08-16 NOTE — ASSESSMENT
[FreeTextEntry1] : The patient has breast CA S/P bilateral mastectomies followed by expanders . She is now going for placement of implants . She has had a TAHBSO without complications . She has had RT and chemo and she had recceived anthracyclines in the past but her LV systolic function has remained normal. The patient has more than 4 METS exercise tolerance and stress echo from last year showed no ischemia on echo images . the patient has HTN and this has been stable. She is an intermediate cardiac risk undergoing an intermediate risk procedure .

## 2022-08-17 ENCOUNTER — OUTPATIENT (OUTPATIENT)
Dept: OUTPATIENT SERVICES | Facility: HOSPITAL | Age: 47
LOS: 1 days | Discharge: HOME | End: 2022-08-17

## 2022-08-17 DIAGNOSIS — Z91.89 OTHER SPECIFIED PERSONAL RISK FACTORS, NOT ELSEWHERE CLASSIFIED: ICD-10-CM

## 2022-08-17 DIAGNOSIS — Z98.890 OTHER SPECIFIED POSTPROCEDURAL STATES: Chronic | ICD-10-CM

## 2022-08-17 DIAGNOSIS — M67.40 GANGLION, UNSPECIFIED SITE: Chronic | ICD-10-CM

## 2022-08-17 DIAGNOSIS — Z90.49 ACQUIRED ABSENCE OF OTHER SPECIFIED PARTS OF DIGESTIVE TRACT: Chronic | ICD-10-CM

## 2022-08-17 DIAGNOSIS — Z90.13 ACQUIRED ABSENCE OF BILATERAL BREASTS AND NIPPLES: Chronic | ICD-10-CM

## 2022-09-02 NOTE — ASU PATIENT PROFILE, ADULT - TEACHING/LEARNING FACTORS INFLUENCE READINESS TO LEARN
HISTORY OF PRESENT ILLNESS     EMERSON Fields presents for procedural visit.  He has no acute complaints or concerns otherwise today.    PAST MEDICAL, FAMILY AND SOCIAL HISTORY     I have reviewed the patient's medications and allergies, past medical, surgical, social and family history, updating these as appropriate.  See histories section of the electronic medical record for a display of this information.    REVIEW OF SYSTEMS     Review of Systems  Deferred.    PHYSICAL EXAM     There were no vitals taken for this visit.    Physical Exam   Skin:            OFFICE PROCEDURE     Procedure type:  Skin tag removal   Pre-procedure diagnosis:  Skin Tag  Indication:  abnormal physical findings and symptomatic relief  Location:  Low mid back  Performed by:  Jak Cifuentes DO    Risks and Benefits:  The possible benefits and risks of this procedure were discussed with the patient, and he gives verbal consent to proceed.    Patient position:  prone    Skin prep:  Betadine  Local anesthesia:  1% lidocaine with epinephrine  Technique:    4 skin tags removed with dermablade.  Hemostasis was obtained with pressure and drysol.     Specimen:  discarded  A dressing was applied with antibiotic ointment.  The procedure was well tolerated with no complications.      ASSESSMENT/PLAN     ASSESSMENT:  1. Skin tag        PLAN:  Orders Placed This Encounter   • REMOVE SKIN TAGS UP TO 15       Local wound care recommended.  Clean with soap and water and apply antibiotic ointment BID.  Call for any concerning signs or symptoms of infection.       Return if symptoms worsen or fail to improve.    He will call or return to the office for any persistent, worsening, or concerning symptoms.  For any emergencies, he will present to the emergency room or call 911.    The patient understands, agrees, and will comply with today's plan.     none

## 2022-09-03 ENCOUNTER — LABORATORY RESULT (OUTPATIENT)
Age: 47
End: 2022-09-03

## 2022-09-06 ENCOUNTER — TRANSCRIPTION ENCOUNTER (OUTPATIENT)
Age: 47
End: 2022-09-06

## 2022-09-06 ENCOUNTER — APPOINTMENT (OUTPATIENT)
Dept: PLASTIC SURGERY | Facility: AMBULATORY SURGERY CENTER | Age: 47
End: 2022-09-06

## 2022-09-06 ENCOUNTER — OUTPATIENT (OUTPATIENT)
Dept: OUTPATIENT SERVICES | Facility: HOSPITAL | Age: 47
LOS: 1 days | Discharge: HOME | End: 2022-09-06

## 2022-09-06 ENCOUNTER — RESULT REVIEW (OUTPATIENT)
Age: 47
End: 2022-09-06

## 2022-09-06 VITALS
HEIGHT: 66 IN | HEART RATE: 75 BPM | TEMPERATURE: 98 F | DIASTOLIC BLOOD PRESSURE: 86 MMHG | WEIGHT: 125 LBS | RESPIRATION RATE: 18 BRPM | SYSTOLIC BLOOD PRESSURE: 128 MMHG | OXYGEN SATURATION: 99 %

## 2022-09-06 VITALS — HEART RATE: 64 BPM | RESPIRATION RATE: 20 BRPM | SYSTOLIC BLOOD PRESSURE: 131 MMHG | DIASTOLIC BLOOD PRESSURE: 74 MMHG

## 2022-09-06 DIAGNOSIS — Z90.49 ACQUIRED ABSENCE OF OTHER SPECIFIED PARTS OF DIGESTIVE TRACT: Chronic | ICD-10-CM

## 2022-09-06 DIAGNOSIS — M67.40 GANGLION, UNSPECIFIED SITE: Chronic | ICD-10-CM

## 2022-09-06 DIAGNOSIS — Z98.890 OTHER SPECIFIED POSTPROCEDURAL STATES: Chronic | ICD-10-CM

## 2022-09-06 DIAGNOSIS — Z90.13 ACQUIRED ABSENCE OF BILATERAL BREASTS AND NIPPLES: Chronic | ICD-10-CM

## 2022-09-06 PROCEDURE — 19370 REVJ PERI-IMPLT CAPSULE BRST: CPT | Mod: 50

## 2022-09-06 PROCEDURE — 88305 TISSUE EXAM BY PATHOLOGIST: CPT | Mod: 26

## 2022-09-06 PROCEDURE — 11970 RPLCMT TISS XPNDR PERM IMPLT: CPT | Mod: 50,59

## 2022-09-06 PROCEDURE — 88300 SURGICAL PATH GROSS: CPT | Mod: 26,59

## 2022-09-06 PROCEDURE — 88304 TISSUE EXAM BY PATHOLOGIST: CPT | Mod: 26

## 2022-09-06 RX ORDER — APREPITANT 80 MG/1
40 CAPSULE ORAL ONCE
Refills: 0 | Status: COMPLETED | OUTPATIENT
Start: 2022-09-06 | End: 2022-09-06

## 2022-09-06 RX ORDER — ONDANSETRON 8 MG/1
4 TABLET, FILM COATED ORAL ONCE
Refills: 0 | Status: DISCONTINUED | OUTPATIENT
Start: 2022-09-06 | End: 2022-09-20

## 2022-09-06 RX ORDER — HEPARIN SODIUM 5000 [USP'U]/ML
5000 INJECTION INTRAVENOUS; SUBCUTANEOUS ONCE
Refills: 0 | Status: COMPLETED | OUTPATIENT
Start: 2022-09-06 | End: 2022-09-06

## 2022-09-06 RX ORDER — SERTRALINE 25 MG/1
1 TABLET, FILM COATED ORAL
Qty: 0 | Refills: 0 | DISCHARGE

## 2022-09-06 RX ORDER — ANASTROZOLE 1 MG/1
1 TABLET ORAL
Qty: 0 | Refills: 0 | DISCHARGE

## 2022-09-06 RX ORDER — GABAPENTIN 400 MG/1
0 CAPSULE ORAL
Qty: 0 | Refills: 0 | DISCHARGE

## 2022-09-06 RX ORDER — METOPROLOL TARTRATE 50 MG
1 TABLET ORAL
Qty: 0 | Refills: 0 | DISCHARGE

## 2022-09-06 RX ORDER — ACETAMINOPHEN 500 MG
0 TABLET ORAL
Qty: 0 | Refills: 0 | DISCHARGE

## 2022-09-06 RX ORDER — MONTELUKAST 4 MG/1
1 TABLET, CHEWABLE ORAL
Qty: 30 | Refills: 3
Start: 2022-09-06 | End: 2023-01-03

## 2022-09-06 RX ORDER — OXYCODONE HYDROCHLORIDE 5 MG/1
1 TABLET ORAL
Qty: 15 | Refills: 0
Start: 2022-09-06 | End: 2022-09-10

## 2022-09-06 RX ORDER — ONDANSETRON 8 MG/1
1 TABLET, FILM COATED ORAL
Qty: 12 | Refills: 0
Start: 2022-09-06 | End: 2022-09-08

## 2022-09-06 RX ORDER — SODIUM CHLORIDE 9 MG/ML
1000 INJECTION, SOLUTION INTRAVENOUS
Refills: 0 | Status: DISCONTINUED | OUTPATIENT
Start: 2022-09-06 | End: 2022-09-20

## 2022-09-06 RX ORDER — AMLODIPINE BESYLATE 2.5 MG/1
1 TABLET ORAL
Qty: 0 | Refills: 0 | DISCHARGE

## 2022-09-06 RX ORDER — OXYCODONE AND ACETAMINOPHEN 5; 325 MG/1; MG/1
1 TABLET ORAL EVERY 4 HOURS
Refills: 0 | Status: DISCONTINUED | OUTPATIENT
Start: 2022-09-06 | End: 2022-09-06

## 2022-09-06 RX ORDER — MORPHINE SULFATE 50 MG/1
2 CAPSULE, EXTENDED RELEASE ORAL
Refills: 0 | Status: DISCONTINUED | OUTPATIENT
Start: 2022-09-06 | End: 2022-09-06

## 2022-09-06 RX ADMIN — APREPITANT 40 MILLIGRAM(S): 80 CAPSULE ORAL at 08:13

## 2022-09-06 RX ADMIN — HEPARIN SODIUM 5000 UNIT(S): 5000 INJECTION INTRAVENOUS; SUBCUTANEOUS at 08:26

## 2022-09-06 RX ADMIN — MORPHINE SULFATE 2 MILLIGRAM(S): 50 CAPSULE, EXTENDED RELEASE ORAL at 10:55

## 2022-09-06 RX ADMIN — SODIUM CHLORIDE 100 MILLILITER(S): 9 INJECTION, SOLUTION INTRAVENOUS at 10:54

## 2022-09-06 NOTE — BRIEF OPERATIVE NOTE - OPERATION/FINDINGS
R partial capsulectomy, R and L extensive capsulotomies, b/l replacement of Nighat with SR 375cc implants

## 2022-09-06 NOTE — BRIEF OPERATIVE NOTE - NSICDXBRIEFPROCEDURE_GEN_ALL_CORE_FT
PROCEDURES:  Removal, tissue expander, with implant insertion 06-Sep-2022 10:42:29  Domenic Dowd  Capsulectomy of breast 06-Sep-2022 10:42:56  Domenic Dowd

## 2022-09-06 NOTE — ASU DISCHARGE PLAN (ADULT/PEDIATRIC) - ASU DC SPECIAL INSTRUCTIONSFT
Please leave the dressings on and dry until follow up with Dr. Gallegos or her PA within 1 week.    Please take all the medications as prescribed:  Duricef for 1 week as an antibiotic  Montelukast for 1-3 months to prevent capsular contracture; Dr. Gallegos will advise on the duration for this  Tylenol and oxycodone as needed for pain  Zofran for nausea

## 2022-09-06 NOTE — CHART NOTE - NSCHARTNOTEFT_GEN_A_CORE
PACU ANESTHESIA ADMISSION NOTE      Procedure: bilateral breast tissue expander change to implant, right partial capsulectomy  Post op diagnosis:  breast cancer     ____  Intubated  TV:______       Rate: ______      FiO2: ______    _x___  Patent Airway    _x___  Full return of protective reflexes    _x___  Full recovery from anesthesia / back to baseline status    Vitals:  T(F): 97  HR: 56  BP: 112/65  RR: 12  SpO2: 100%    Mental Status:  _x___ Awake   _x____ Alert   _____ Drowsy   _____ Sedated    Nausea/Vomiting:  _x___  NO       ______Yes,   See Post - Op Orders         Pain Scale (0-10):  __0___    Treatment: _x___ None    ____ See Post - Op/PCA Orders    Post - Operative Fluids:   __x__ Oral   ____ See Post - Op Orders    Plan: Discharge:   _x___Home       _____Floor     _____Critical Care    _____  Other:_________________    Comments:  No anesthesia issues or complications noted.  Discharge when criteria met.

## 2022-09-06 NOTE — ASU DISCHARGE PLAN (ADULT/PEDIATRIC) - CARE PROVIDER_API CALL
Gwen Gallegos)  Surgery  Plastics  [address] [place] [address], [address]  [place] [address]  Phone: [phone]  Fax: [phone]  Follow Up Time: 1 week

## 2022-09-06 NOTE — ASU DISCHARGE PLAN (ADULT/PEDIATRIC) - NS MD DC FALL RISK RISK
For information on Fall & Injury Prevention, visit: [email or web address] OR  [email or web address] OR  https://www.cdc.gov/steadi/patient.html

## 2022-09-08 DIAGNOSIS — Z88.2 ALLERGY STATUS TO SULFONAMIDES: ICD-10-CM

## 2022-09-08 DIAGNOSIS — Z85.3 PERSONAL HISTORY OF MALIGNANT NEOPLASM OF BREAST: ICD-10-CM

## 2022-09-08 DIAGNOSIS — Z42.1 ENCOUNTER FOR BREAST RECONSTRUCTION FOLLOWING MASTECTOMY: ICD-10-CM

## 2022-09-08 DIAGNOSIS — I10 ESSENTIAL (PRIMARY) HYPERTENSION: ICD-10-CM

## 2022-09-08 DIAGNOSIS — Z90.13 ACQUIRED ABSENCE OF BILATERAL BREASTS AND NIPPLES: ICD-10-CM

## 2022-09-08 DIAGNOSIS — Z90.49 ACQUIRED ABSENCE OF OTHER SPECIFIED PARTS OF DIGESTIVE TRACT: ICD-10-CM

## 2022-09-08 DIAGNOSIS — Z88.1 ALLERGY STATUS TO OTHER ANTIBIOTIC AGENTS STATUS: ICD-10-CM

## 2022-09-09 LAB — SURGICAL PATHOLOGY STUDY: SIGNIFICANT CHANGE UP

## 2022-09-12 ENCOUNTER — APPOINTMENT (OUTPATIENT)
Dept: PLASTIC SURGERY | Facility: CLINIC | Age: 47
End: 2022-09-12

## 2022-09-12 PROCEDURE — 99024 POSTOP FOLLOW-UP VISIT: CPT

## 2022-09-12 NOTE — ASSESSMENT
[FreeTextEntry1] : 47 yo F with right breast cancer s/p BL SSM, Rt axillary LN dissection (Dr. Macias) with immediate prepectoral TE reconstruction (filled to 100cc) with AlloDerm (6/15/21). Doing very well. \par s/p XRT right breast (completed 1/4/22)\par \par - Keep suture tails\par - All bandages changed, steri strips placed\par - signs and symptoms of infection reviewed: finish abx\par - No heavy lifting/pushing/pulling > 10lbs until 6 weeks from surgery\par - No cardiovascular activity / inc heart rate until 3 weeks from surgery\par - Continue compression garment/sx bra\par - Continue Singulair x 3 months \par - Sleep supine\par - Ok to shower, no submerging \par - All post op instructions reviewed, all questions answered\par - f/u next week for suture removal & path review\par \par \par Due to COVID-19, pre-visit patient instructions were explained to the patient and their symptoms were checked upon arrival. Masks were used by the healthcare provider and staff and the examination room was cleaned after the patient visit concluded\par \par \par

## 2022-09-12 NOTE — DATA REVIEWED
[FreeTextEntry1] : Pathology             Final\par \par No Documents Attached\par \par [facility] Accession Number : [id number]\par Patient:   LADONNA GUZMAN\par \par Accession:                             [id number]\par \par Collected Date/Time:                   9/6/2022 09:30 EDT\par Received Date/Time:                    9/6/2022 12:50 EDT\par \par Surgical Pathology Report - Auth (Verified)\par \par Specimen(s) Submitted\par 1  Right mastectomy scar\par 2  Left mastectomy scar\par 3  Right tissue expander\par 4  Right capsule\par 5  Left tissue expander\par \par Final Diagnosis\par \par 1.  Breast, right mastectomy skin scar, excision/revision:\par - Benign skin with dermal scar.\par \par 2.  Breast, left mastectomy skin scar, excision/revision:\par - Benign skin with dermal scar and focal remote postsurgical site changes\par including suture granulomata.\par \par 3.  Breast, right, tissue expander removal and exchange:\par - Ruptured and deflated  synthetic breast implant/tissue expander\par (macroscopic diagnosis).\par \par 4.  Breast, right tissue, capsulectomy:\par - Benign atrophic fatty breast tissue, fibroadipose connective tissue,\par and skeletal muscle with focal dense linear fibrosis associated with\par early pseudo synovial metaplasia, mild chronic lymphohistiocytic cell\par inflammation, and embedded refractile non-polarizable foreign material;\par consistent with a synthetic breast implant soft tissue capsule.\par \par 5.  Breast, right, tissue expander removal and exchange:\par - Ruptured and deflated  synthetic breast implant/tissue expander\par (macroscopic diagnosis).\par \par Verified by: Jesus Correa M.D.\par (Electronic Signature)\par Reported on: 09/09/22 15:19 EDT, [facility],\par [address] [place] [address]\par Phone: [phone]   Fax: [phone]\par _________________________________________________________________\par \par Clinical Information\par Bilateral breast tissue expander exchange, possible capsulectomy\par COVID (-)\par \par Perioperative Diagnosis\par Implant - based breast reconstruction, history of breast cancer\par \par \par Gross Description\par 1.  The specimen received in formalin is labeled "right mastectomy scar".\par The specimen consists of a tan-white strand of tissue measuring 9 x 0.2\par cm.  The specimen is submitted entirely.  (1 block)\par \par 2.  The specimen received in formalin is labeled "left mastectomy scar".\par The specimen consists of a tan-white strand of tissue measuring 5 x 0.3\par cm.  The specimen is submitted entirely.  (1 block)\par \par 3.  The specimen received is labeled "right tissue expander".  The\par specimen consists of a deflated plastic tissue expander with linear\par defect and the inscription [place] [id number] cc.  Gross description\par only. No sections submitted.\par \par 4.  The specimen received in formalin is labeled "right capsule".  The\par specimen consists of a slender membranous fragment of tissue measuring 13\par cm x 0.5 cm.  The specimen is submitted entirely.  (1 block)\par \par 5.  The specimen received is labeled "left tissue expander".  The specimen\par consists of a deflated plastic tissue expander with linear defect and the\par inscription [place] [id number] cc.  Gross description only. No sections\par submitted.\par \par Specimen was received and underwent gross examination at [facility], [address] [place] [address].\par \par 09/06/2022 20:08:41 EDT    OC_\par \par  Ordered by: TALIB ANNA       Collected/Examined: 06Sep2022 09:30AM       \par Verification Required       Stage: Final       \par  Performed at: [facility]       Resulted: 09Sep2022 03:19PM       Last Updated: 09Sep2022 03:20PM       Accession: [id number]

## 2022-09-12 NOTE — PHYSICAL EXAM
[de-identified] : well-appearing, NAD [de-identified] : ATNC [de-identified] : Supple [de-identified] : Non labored [de-identified] : Bilateral breasts soft, mastectomy flaps well perfused with good capp refill <2 secs. B/l incisions are CDI, without evidence of seroma/hematoma/cellulitis. Suture tails in place. No drainage to note. Implants in good position, excellent symmetry [de-identified] : Alert and oriented

## 2022-09-12 NOTE — HISTORY OF PRESENT ILLNESS
[FreeTextEntry1] : 44 yo   F with PMHx of anxiety/depression, HTN, GERD, asthma who was recently dx with RIGHT invasive ductal carcinoma and DCIS 2.4 cm @ 10:00 (ER+/ME+/HER2(-)) after palpable mass in right breast.  Denies breast pain, nipple discharge and retraction.  Here with friend, Rukhsana.\par \par Patient is learning toward bilateral mastectomy.  She is inquiring about immediate pre-pec implant based reconstruction.  She would like simplest recovery. 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 lives alone (2 cats); post-op assistance from friends\par \par Wears 34B, cup not filled.  She would like a full B in reconstructive volume.\par Recent lost weight 8 lbs. currently 118 lbs.  Mother passed away recently 2 months ago.\par \par Interval hx (21). Patient presents today POD#8 s/p BL SSM, Rt axillary dissection with immediate prepectoral TE reconstruction (filled to 100cc) with AlloDerm. Doing very well with adequate pain management. Taking oral antibiotics as prescribed. Denies any f/c or bleeding. Drains all functional with appropriate output. Rt axillary drain with minimal 5-7 cc daily. \par \par Interval hx (21). Patient presents today POD#13 s/p BL SSM, Rt axillary dissection with immediate prepectoral TE reconstruction (filled to 100cc) with AlloDerm. Doing well with no new concerns. Denies any f/c or bleeding. Drains functional with decreasing output as expected. \par \par Interval hx (21): Pt presents today POD #17 s/p BL SSM, Rt axillary dissection with immediate prepectoral TE reconstruction (filled to 100cc) with AlloDerm. Feeling well after first TE fill. Remaining right drain output: . Denies f/c, redness, swelling, CP/SOB, N/V, or calf pain.\par \par Interval hx (21): Pt presents today POD #22 s/p BL SSM, Rt axillary dissection with immediate prepectoral TE reconstruction (filled to 100cc) with AlloDerm. Saw Dr. Macias  who discussed CTX x12 weeks and recommended RT consult given 3 positive LNs. \par \par Interval hx (21): Pt presents today POD #29 s/p BL SSM, Rt axillary dissection with immediate prepectoral TE reconstruction (filled to 100cc) with AlloDerm. Continues to do well with no significant discomfort. Had chemoport placed on Monday and scheduled to commence CTX later this week. Denies any f/c, bleeding or pressure after TE fills. \par \par Interval hx (21):  Here POD#41 s/p BL immediate prepec TE/Alloderm.  Started on chemotherapy, ; next cycle on .  Pt reports she will need RIGHT BREAST XRT.  Here for serial TE fill.   Denies fevers, chills, redness, swelling.\par \par Interval hx (21):  Here for POD#56 s/p BL prepec TE/Alloderm.  s/p 2 cycles of chemo. s/p chemoport.\par \par Interval hx (21):  Pt presents today as instructed by her oncologist for evaluation of left medial breast "bubble". Denies any pain, f/c, skin changes, open wounds or drainage. Tolerating chemotx with no significant complaints. \par \par Interval hx (21). Patient presents today for left TE overfill. Doing well with no new complaints. Tolerating chemotx with alopecia as expected, otherwise no significant adverse effects. \par \par Interval hx (21): Pt presents today for follow up. After [facility] planning, Dr. Shah advised to have left TE delfated for XRT field optimization for right XRT.  no new health issues; completed chemotherapy 3 weeks ago.  no fevers, chills, redness, swelling.\par \par Interval hx (11/15/21):  Went to radiation simulation today.  Left breast still within field and will require additional TE deflation for XRT.  No other complaints\par \par Interval hx (22): Pt presents today to refill left TE as she has completed right breast XRT as of 22. Tolerated XRT very well and applying moisturizer 3x/day.\par \par Interval hx (22): Pt presents today for left TE fill. Doing well and happy with right breast TE vol. Scheduling PAUL/BSO with Dr. Suggs next month. \par \par Interval hx (22): Here today for post-XRT breast exam; completed XRT 22.  she is also s/p lap PAUL (22), IR removed chemo-port 2/10/22.  She is inquiring about possible additional TE fill/upsize.\par \par Interval hx (22): Pt presents today for f/u, completed XRT 22 and is s/p lap PAUL (22), IR removed chemo-port 2/10/22.  Reports that right breast only feels tight during yoga.  She is here to discuss reconstructive options for next stage.  She expressed wish for implant-based reconstruction trial before undergoing LYNDA flap in the future.\par \par Interval hx (22): s/p BL pre-pec TE breast reconstruction (6/15/21), s/p XRT 22 and is s/p lap PAUL (22), IR removed chemo-port 2/10/22.  Now 7 months s/p XRT, here to discuss TE exchange to silicone implant.  She has been going to OT from right shoulder ROM 2/2 capsular contracture.\par \par Interval hx (22): Pt is POD# 6 s/p removal of b/l Nighat, R partial capsulectomy, B/l capsulotomies,  with b/l silicone implant insertion (375ccs). Denies fever/chills/cp/sob/le pain or swelling. Only  taking Tylenol prn for discomfort. Wearing surgical bra . Last day abx tmw. Started singulair daily.

## 2022-09-14 ENCOUNTER — OUTPATIENT (OUTPATIENT)
Dept: OUTPATIENT SERVICES | Facility: HOSPITAL | Age: 47
LOS: 1 days | Discharge: HOME | End: 2022-09-14

## 2022-09-14 ENCOUNTER — APPOINTMENT (OUTPATIENT)
Dept: RADIATION ONCOLOGY | Facility: HOSPITAL | Age: 47
End: 2022-09-14

## 2022-09-14 VITALS
WEIGHT: 131.13 LBS | RESPIRATION RATE: 16 BRPM | HEART RATE: 94 BPM | TEMPERATURE: 97.3 F | BODY MASS INDEX: 21.82 KG/M2 | SYSTOLIC BLOOD PRESSURE: 116 MMHG | OXYGEN SATURATION: 97 % | DIASTOLIC BLOOD PRESSURE: 79 MMHG

## 2022-09-14 DIAGNOSIS — Z90.13 ACQUIRED ABSENCE OF BILATERAL BREASTS AND NIPPLES: Chronic | ICD-10-CM

## 2022-09-14 DIAGNOSIS — K82.4 CHOLESTEROLOSIS OF GALLBLADDER: ICD-10-CM

## 2022-09-14 DIAGNOSIS — Z98.890 OTHER SPECIFIED POSTPROCEDURAL STATES: Chronic | ICD-10-CM

## 2022-09-14 DIAGNOSIS — Z90.49 ACQUIRED ABSENCE OF OTHER SPECIFIED PARTS OF DIGESTIVE TRACT: Chronic | ICD-10-CM

## 2022-09-14 DIAGNOSIS — M67.40 GANGLION, UNSPECIFIED SITE: Chronic | ICD-10-CM

## 2022-09-14 DIAGNOSIS — C50.411 MALIGNANT NEOPLASM OF UPPER-OUTER QUADRANT OF RIGHT FEMALE BREAST: ICD-10-CM

## 2022-09-14 PROCEDURE — 99213 OFFICE O/P EST LOW 20 MIN: CPT

## 2022-09-14 PROCEDURE — 76705 ECHO EXAM OF ABDOMEN: CPT | Mod: 26

## 2022-09-14 RX ORDER — GABAPENTIN 300 MG/1
300 CAPSULE ORAL
Qty: 7 | Refills: 0 | Status: DISCONTINUED | COMMUNITY
Start: 2022-08-29 | End: 2022-09-14

## 2022-09-14 RX ORDER — MONTELUKAST SODIUM 10 MG/1
TABLET, FILM COATED ORAL
Refills: 0 | Status: ACTIVE | COMMUNITY

## 2022-09-14 RX ORDER — GLUC/MSM/COLGN2/HYAL/ANTIARTH3 375-375-20
TABLET ORAL
Refills: 0 | Status: DISCONTINUED | COMMUNITY
End: 2022-09-14

## 2022-09-14 NOTE — PHYSICAL EXAM
[Sclera] : the sclera and conjunctiva were normal [Heart Rate And Rhythm] : heart rate and rhythm were normal [Heart Sounds] : normal S1 and S2 [Nondistended] : nondistended [Cervical Lymph Nodes Enlarged Posterior Bilaterally] : posterior cervical [Cervical Lymph Nodes Enlarged Anterior Bilaterally] : anterior cervical [Supraclavicular Lymph Nodes Enlarged Bilaterally] : supraclavicular [Axillary Lymph Nodes Enlarged Bilaterally] : axillary [Normal] : oriented to person, place and time, the affect was normal, the mood was normal and not anxious [de-identified] : She is examined in both the upright and supine positions. Bilateral reconstructed breast are without any nodules.  No evidence of capsular contracture.

## 2022-09-14 NOTE — PROCEDURE
[FreeTextEntry1] : s/p BL TE reconstruction  Moderna [FreeTextEntry2] : Tissue expander fill  [FreeTextEntry6] : The port location was identified and marked on the skin with the aid of the implant magnet. The skin was prepped in sterile fashion. Sterile saline was injected. The skin flaps remained well perfused, pink with good capillary refill <2 sec. The patient tolerated the procedure. \par \par Left mastectomy 250 g; Right mastectomy 435 g\par \par Right TE pre-expansion volume: 260 cc\par Total volume right TE after expansion : 310 cc (desired volume)\par \par Left TE pre-expansion volume: 340 cc (desired volume); prior 320 ml\par Total volume left TE after deflation : 210 cc

## 2022-09-14 NOTE — LETTER CLOSING
[Consult Closing:] : Thank you for allowing me to participate in the care of this patient.  If you have any questions, please do not hesitate to contact me. [Sincerely yours,] : Sincerely yours, [FreeTextEntry3] : Elin Shah M.D. \par \par Electronically proofread and signed by:  Elin Shah MD\par Attending, Department of Radiation Medicine\par [facility]\par \par CC: Dr. Macias

## 2022-09-14 NOTE — HISTORY OF PRESENT ILLNESS
[FreeTextEntry1] : LADONNA GUZMAN returns to clinic in follow up visit.  As you know, LADONNA GUZMAN is a 47 year old female with invasive ductal carcinoma of the right breast, upper outer quadrant, G3, ER /MA positive / HER 2 negative, [id number], Stage IIA.  She is s/p bilateral mastectomies and TE reconstruction, right ALND and chemotherapy.  The patient was treated to the right reconstructed breast and regional nodes using a 3D mono-isocentric technique.  The patient tolerated treatments quite well. The patient had the expected side effects of skin erythema and fatigue.  The patient received 5000 cGy to the right reconstructed breast and right Supraclavicular nodes from 11/29/2021  through 1/4/2022.  I last saw her in February 2022. In the interim, the patient reports doing well. She continues to take anastrozole as prescribed with mild hot flashes and joint pain. She denies breast pain. On 9/6/2022, she had  tissue expander removal and exchange for permanent implants.\par \par Upcoming appointments:\par Dr. Little 12/29/2022\par Dr. Gurrola 11/18/2022\par Dr. Gallegos 9/19/2022\par \par

## 2022-09-14 NOTE — DISEASE MANAGEMENT
[Pathological] : TNM Stage: p [IIA] : IIA [FreeTextEntry4] : invasive ductal carcinoma of the right breast, upper outer quadrant, G3, ER /[place] positive / HER 2 negative  [TTNM] : 2 [NTNM] : 1a [MTNM] : 0 [de-identified] : right CW/Sclav

## 2022-09-18 ENCOUNTER — NON-APPOINTMENT (OUTPATIENT)
Age: 47
End: 2022-09-18

## 2022-09-19 ENCOUNTER — APPOINTMENT (OUTPATIENT)
Dept: PLASTIC SURGERY | Facility: CLINIC | Age: 47
End: 2022-09-19

## 2022-09-19 PROCEDURE — 99024 POSTOP FOLLOW-UP VISIT: CPT

## 2022-09-19 NOTE — ASSESSMENT
[FreeTextEntry1] : 45 yo F with right breast cancer s/p BL SSM, Rt axillary LN dissection (Dr. Macias) with immediate prepectoral TE reconstruction (filled to 100cc) with AlloDerm (6/15/21). Doing very well. \par s/p XRT right breast (completed 1/4/22)\par \par POD#13 s/p BL TE exchange to silicone implants and capsulotomies\par \par - Suture tails removed\par - No heavy lifting/pushing/pulling > 10lbs until 6 weeks from surgery\par - No cardiovascular activity / inc heart rate until 3 weeks from surgery\par - Continue compression garment/sx bra\par - Continue Singulair x 3 months \par - Sleep supine\par - Work release given: full duty without restrictions except patient lifts\par - All post op instructions reviewed, all questions answered\par - Follow up in 4 weeks\par \par Photos were taken with patient permission.\par \par Due to COVID-19, pre-visit patient instructions were explained to the patient and their symptoms were checked upon arrival. Masks were used by the healthcare provider and staff and the examination room was cleaned after the patient visit concluded\par \par \par

## 2022-09-19 NOTE — HISTORY OF PRESENT ILLNESS
[FreeTextEntry1] : 46 yo   F with PMHx of anxiety/depression, HTN, GERD, asthma who was recently dx with RIGHT invasive ductal carcinoma and DCIS 2.4 cm @ 10:00 (ER+/CT+/HER2(-)) after palpable mass in right breast.  Denies breast pain, nipple discharge and retraction.  Here with friend, Rukhsana.\par \par Patient is learning toward bilateral mastectomy.  She is inquiring about immediate pre-pec implant based reconstruction.  She would like simplest recovery. 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  She would like a full B in reconstructive volume.\par Recent lost weight 8 lbs. currently 118 lbs.  Mother passed away recently 2 months ago.\par \par Interval hx (21). Patient presents today POD#8 s/p BL SSM, Rt axillary dissection with immediate prepectoral TE reconstruction (filled to 100cc) with AlloDerm. Doing very well with adequate pain management. Taking oral antibiotics as prescribed. Denies any f/c or bleeding. Drains all functional with appropriate output. Rt axillary drain with minimal 5-7 cc daily. \par \par Interval hx (21). Patient presents today POD#13 s/p BL SSM, Rt axillary dissection with immediate prepectoral TE reconstruction (filled to 100cc) with AlloDerm. Doing well with no new concerns. Denies any f/c or bleeding. Drains functional with decreasing output as expected. \par \par Interval hx (21): Pt presents today POD #17 s/p BL SSM, Rt axillary dissection with immediate prepectoral TE reconstruction (filled to 100cc) with AlloDerm. Feeling well after first TE fill. Remaining right drain output: . Denies f/c, redness, swelling, CP/SOB, N/V, or calf pain.\par \par Interval hx (21): Pt presents today POD #22 s/p BL SSM, Rt axillary dissection with immediate prepectoral TE reconstruction (filled to 100cc) with AlloDerm. Saw Dr. Macias  who discussed CTX x12 weeks and recommended RT consult given 3 positive LNs. \par \par Interval hx (21): Pt presents today POD #29 s/p BL SSM, Rt axillary dissection with immediate prepectoral TE reconstruction (filled to 100cc) with AlloDerm. Continues to do well with no significant discomfort. Had chemoport placed on Monday and scheduled to commence CTX later this week. Denies any f/c, bleeding or pressure after TE fills. \par \par Interval hx (21):  Here POD#41 s/p BL immediate prepec TE/Alloderm.  Started on chemotherapy, ; next cycle on .  Pt reports she will need RIGHT BREAST XRT.  Here for serial TE fill.   Denies fevers, chills, redness, swelling.\par \par Interval hx (21):  Here for POD#56 s/p BL prepec TE/Alloderm.  s/p 2 cycles of chemo. s/p chemoport.\par \par Interval hx (21):  Pt presents today as instructed by her oncologist for evaluation of left medial breast "bubble". Denies any pain, f/c, skin changes, open wounds or drainage. Tolerating chemotx with no significant complaints. \par \par Interval hx (21). Patient presents today for left TE overfill. Doing well with no new complaints. Tolerating chemotx with alopecia as expected, otherwise no significant adverse effects. \par \par Interval hx (21): Pt presents today for follow up. After [facility] planning, Dr. Shah advised to have left TE delfated for XRT field optimization for right XRT.  no new health issues; completed chemotherapy 3 weeks ago.  no fevers, chills, redness, swelling.\par \par Interval hx (11/15/21):  Went to radiation simulation today.  Left breast still within field and will require additional TE deflation for XRT.  No other complaints\par \par Interval hx (22): Pt presents today to refill left TE as she has completed right breast XRT as of 22. Tolerated XRT very well and applying moisturizer 3x/day.\par \par Interval hx (22): Pt presents today for left TE fill. Doing well and happy with right breast TE vol. Scheduling PAUL/BSO with Dr. Suggs next month. \par \par Interval hx (22): Here today for post-XRT breast exam; completed XRT 22.  she is also s/p lap PAUL (22), IR removed chemo-port 2/10/22.  She is inquiring about possible additional TE fill/upsize.\par \par Interval hx (22): Pt presents today for f/u, completed XRT 22 and is s/p lap PAUL (22), IR removed chemo-port 2/10/22.  Reports that right breast only feels tight during yoga.  She is here to discuss reconstructive options for next stage.  She expressed wish for implant-based reconstruction trial before undergoing LYNDA flap in the future.\par \par Interval hx (22): s/p BL pre-pec TE breast reconstruction (6/15/21), s/p XRT 22 and is s/p lap PAUL (22), IR removed chemo-port 2/10/22.  Now 7 months s/p XRT, here to discuss TE exchange to silicone implant.  She has been going to OT from right shoulder ROM 2/2 capsular contracture.\par \par Interval hx (22): Pt is POD# 6 s/p removal of b/l Nighat, R partial capsulectomy, B/l capsulotomies,  with b/l silicone implant insertion (375ccs). Denies fever/chills/cp/sob/le pain or swelling. Only  taking Tylenol prn for discomfort. Wearing surgical bra . Last day abx tmw. Started singulair daily. \par \par Interval hx (22):  POD#13 s/p removal of b/l Nighat, R partial capsulectomy, B/l capsulotomies,  with b/l silicone implant insertion (375ccs).  No fevers, chills, breast swelling. On singulair.

## 2022-09-19 NOTE — DATA REVIEWED
[FreeTextEntry1] : Pathology             Final\par \par No Documents Attached\par \par [facility] Accession Number : [id number]\par Patient:   LADONNA GUZMAN\par \par Accession:                             [id number]\par \par Collected Date/Time:                   9/6/2022 09:30 EDT\par Received Date/Time:                    9/6/2022 12:50 EDT\par \par Surgical Pathology Report - Auth (Verified)\par \par Specimen(s) Submitted\par 1  Right mastectomy scar\par 2  Left mastectomy scar\par 3  Right tissue expander\par 4  Right capsule\par 5  Left tissue expander\par \par Final Diagnosis\par \par 1.  Breast, right mastectomy skin scar, excision/revision:\par - Benign skin with dermal scar.\par \par 2.  Breast, left mastectomy skin scar, excision/revision:\par - Benign skin with dermal scar and focal remote postsurgical site changes\par including suture granulomata.\par \par 3.  Breast, right, tissue expander removal and exchange:\par - Ruptured and deflated  synthetic breast implant/tissue expander\par (macroscopic diagnosis).\par \par 4.  Breast, right tissue, capsulectomy:\par - Benign atrophic fatty breast tissue, fibroadipose connective tissue,\par and skeletal muscle with focal dense linear fibrosis associated with\par early pseudo synovial metaplasia, mild chronic lymphohistiocytic cell\par inflammation, and embedded refractile non-polarizable foreign material;\par consistent with a synthetic breast implant soft tissue capsule.\par \par 5.  Breast, right, tissue expander removal and exchange:\par - Ruptured and deflated  synthetic breast implant/tissue expander\par (macroscopic diagnosis).\par \par Verified by: Jesus Correa M.D.\par (Electronic Signature)\par Reported on: 09/09/22 15:19 EDT, [facility],\par [address] [place] [address]\par Phone: [phone]   Fax: [phone]\par _________________________________________________________________\par \par Clinical Information\par Bilateral breast tissue expander exchange, possible capsulectomy\par COVID (-)\par \par Perioperative Diagnosis\par Implant - based breast reconstruction, history of breast cancer\par \par \par Gross Description\par 1.  The specimen received in formalin is labeled "right mastectomy scar".\par The specimen consists of a tan-white strand of tissue measuring 9 x 0.2\par cm.  The specimen is submitted entirely.  (1 block)\par \par 2.  The specimen received in formalin is labeled "left mastectomy scar".\par The specimen consists of a tan-white strand of tissue measuring 5 x 0.3\par cm.  The specimen is submitted entirely.  (1 block)\par \par 3.  The specimen received is labeled "right tissue expander".  The\par specimen consists of a deflated plastic tissue expander with linear\par defect and the inscription [place] [id number] cc.  Gross description\par only. No sections submitted.\par \par 4.  The specimen received in formalin is labeled "right capsule".  The\par specimen consists of a slender membranous fragment of tissue measuring 13\par cm x 0.5 cm.  The specimen is submitted entirely.  (1 block)\par \par 5.  The specimen received is labeled "left tissue expander".  The specimen\par consists of a deflated plastic tissue expander with linear defect and the\par inscription [place] [id number] 450 cc.  Gross description only. No sections\par submitted.\par \par Specimen was received and underwent gross examination at [facility], [address] [place] [address].\par \par 09/06/2022 20:08:41 EDT    OC_\par \par  Ordered by: TALIB ANNA       Collected/Examined: 06Sep2022 09:30AM       \par Verification Required       Stage: Final       \par  Performed at: [facility]       Resulted: 09Sep2022 03:19PM       Last Updated: 09Sep2022 03:20PM       Accession: [id number]

## 2022-09-19 NOTE — PHYSICAL EXAM
[de-identified] : well-appearing, NAD [de-identified] : ATNC [de-identified] : Supple [de-identified] : Non labored [de-identified] : Bilateral breasts soft, mastectomy flaps well perfused with good capp refill <2 secs. B/l incisions are CDI, without evidence of seroma/hematoma/cellulitis. Suture tails removed. Implants in good position, excellent symmetry [de-identified] : Alert and oriented

## 2022-10-18 ENCOUNTER — APPOINTMENT (OUTPATIENT)
Dept: GASTROENTEROLOGY | Facility: CLINIC | Age: 47
End: 2022-10-18

## 2022-11-04 ENCOUNTER — APPOINTMENT (OUTPATIENT)
Dept: PLASTIC SURGERY | Facility: CLINIC | Age: 47
End: 2022-11-04

## 2022-11-04 PROCEDURE — 99024 POSTOP FOLLOW-UP VISIT: CPT

## 2022-11-04 NOTE — PHYSICAL EXAM
[de-identified] : well-appearing, NAD [de-identified] : ATNC [de-identified] : Supple [de-identified] : Non labored [de-identified] : Bilateral breasts soft, mastectomy flaps well perfused with good capp refill <2 secs. B/l incisions are CDI, without evidence of seroma/hematoma/cellulitis. Suture tails removed. Implants in good position, excellent symmetry, slight anterior volume deficiency/shape [de-identified] : Alert and oriented

## 2022-11-04 NOTE — ASSESSMENT
[FreeTextEntry1] : 45 yo F with right breast cancer s/p BL SSM, Rt axillary LN dissection (Dr. Macias) with immediate prepectoral TE reconstruction (filled to 100cc) with AlloDerm (6/15/21). Doing very well. \par s/p XRT right breast (completed 1/4/22)\par \par 8 weeks s/p BL TE exchange to silicone implants and capsulotomies\par \par - ok to resume yoga\par - ok for chiropractor in 1 month\par - resume regular garments\par - Continue Singulair x 3 months \par - Sleep supine\par - All post op instructions reviewed, all questions answered\par - Follow up in 3 months\par \par Photos were taken with patient permission at last visit\par \par Due to COVID-19, pre-visit patient instructions were explained to the patient and their symptoms were checked upon arrival. Masks were used by the healthcare provider and staff and the examination room was cleaned after the patient visit concluded\par \par \par

## 2022-11-04 NOTE — HISTORY OF PRESENT ILLNESS
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  Denies breast pain, nipple discharge and retraction.  Here with friend, Rukhsana.\par \par Patient is learning toward bilateral mastectomy.  She is inquiring about immediate pre-pec implant based reconstruction.  She would like simplest recovery. 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  She would like a full B in reconstructive volume.\par Recent lost weight 8 lbs. currently 118 lbs.  Mother passed away recently 2 months ago.\par \par Interval hx (21). Patient presents today POD#8 s/p BL SSM, Rt axillary dissection with immediate prepectoral TE reconstruction (filled to 100cc) with AlloDerm. Doing very well with adequate pain management. Taking oral antibiotics as prescribed. Denies any f/c or bleeding. Drains all functional with appropriate output. Rt axillary drain with minimal 5-7 cc daily. \par \par Interval hx (21). Patient presents today POD#13 s/p BL SSM, Rt axillary dissection with immediate prepectoral TE reconstruction (filled to 100cc) with AlloDerm. Doing well with no new concerns. Denies any f/c or bleeding. Drains functional with decreasing output as expected. \par \par Interval hx (21): Pt presents today POD #17 s/p BL SSM, Rt axillary dissection with immediate prepectoral TE reconstruction (filled to 100cc) with AlloDerm. Feeling well after first TE fill. Remaining right drain output: . Denies f/c, redness, swelling, CP/SOB, N/V, or calf pain.\par \par Interval hx (21): Pt presents today POD #22 s/p BL SSM, Rt axillary dissection with immediate prepectoral TE reconstruction (filled to 100cc) with AlloDerm. Saw Dr. Macias  who discussed CTX x12 weeks and recommended RT consult given 3 positive LNs. \par \par Interval hx (21): Pt presents today POD #29 s/p BL SSM, Rt axillary dissection with immediate prepectoral TE reconstruction (filled to 100cc) with AlloDerm. Continues to do well with no significant discomfort. Had chemoport placed on Monday and scheduled to commence CTX later this week. Denies any f/c, bleeding or pressure after TE fills. \par \par Interval hx (21):  Here POD#41 s/p BL immediate prepec TE/Alloderm.  Started on chemotherapy, ; next cycle on .  Pt reports she will need RIGHT BREAST XRT.  Here for serial TE fill.   Denies fevers, chills, redness, swelling.\par \par Interval hx (21):  Here for POD#56 s/p BL prepec TE/Alloderm.  s/p 2 cycles of chemo. s/p chemoport.\par \par Interval hx (21):  Pt presents today as instructed by her oncologist for evaluation of left medial breast "bubble". Denies any pain, f/c, skin changes, open wounds or drainage. Tolerating chemotx with no significant complaints. \par \par Interval hx (21). Patient presents today for left TE overfill. Doing well with no new complaints. Tolerating chemotx with alopecia as expected, otherwise no significant adverse effects. \par \par Interval hx (21): Pt presents today for follow up. After [facility] planning, Dr. Shah advised to have left TE delfated for XRT field optimization for right XRT.  no new health issues; completed chemotherapy 3 weeks ago.  no fevers, chills, redness, swelling.\par \par Interval hx (11/15/21):  Went to radiation simulation today.  Left breast still within field and will require additional TE deflation for XRT.  No other complaints\par \par Interval hx (22): Pt presents today to refill left TE as she has completed right breast XRT as of 22. Tolerated XRT very well and applying moisturizer 3x/day.\par \par Interval hx (22): Pt presents today for left TE fill. Doing well and happy with right breast TE vol. Scheduling PAUL/BSO with Dr. Suggs next month. \par \par Interval hx (22): Here today for post-XRT breast exam; completed XRT 22.  she is also s/p lap PAUL (22), IR removed chemo-port 2/10/22.  She is inquiring about possible additional TE fill/upsize.\par \par Interval hx (22): Pt presents today for f/u, completed XRT 22 and is s/p lap PAUL (22), IR removed chemo-port 2/10/22.  Reports that right breast only feels tight during yoga.  She is here to discuss reconstructive options for next stage.  She expressed wish for implant-based reconstruction trial before undergoing LYNDA flap in the future.\par \par Interval hx (22): s/p BL pre-pec TE breast reconstruction (6/15/21), s/p XRT 22 and is s/p lap PAUL (22), IR removed chemo-port 2/10/22.  Now 7 months s/p XRT, here to discuss TE exchange to silicone implant.  She has been going to OT from right shoulder ROM 2/2 capsular contracture.\par \par Interval hx (22): Pt is POD# 6 s/p removal of b/l Nighat, R partial capsulectomy, B/l capsulotomies,  with b/l silicone implant insertion (375ccs). Denies fever/chills/cp/sob/le pain or swelling. Only  taking Tylenol prn for discomfort. Wearing surgical bra . Last day abx tmw. Started singulair daily. \par \par Interval hx (22):  POD#13 s/p removal of b/l Nighat, R partial capsulectomy, B/l capsulotomies,  with b/l silicone implant insertion (375ccs).  No fevers, chills, breast swelling. On singulair.\par \par Interval hx (22): 8 weeks removal of b/l Nighat, R partial capsulectomy, B/l capsulotomies,  with b/l silicone implant insertion (375ccs).  No fevers, chills, breast swelling. On singulair for refill

## 2022-11-04 NOTE — DATA REVIEWED
[FreeTextEntry1] : Pathology             Final\par \par No Documents Attached\par \par [facility] Accession Number : [id number]\par Patient:   LADONNA GUZMAN\par \par Accession:                             [id number]\par \par Collected Date/Time:                   9/6/2022 09:30 EDT\par Received Date/Time:                    9/6/2022 12:50 EDT\par \par Surgical Pathology Report - Auth (Verified)\par \par Specimen(s) Submitted\par 1  Right mastectomy scar\par 2  Left mastectomy scar\par 3  Right tissue expander\par 4  Right capsule\par 5  Left tissue expander\par \par Final Diagnosis\par \par 1.  Breast, right mastectomy skin scar, excision/revision:\par - Benign skin with dermal scar.\par \par 2.  Breast, left mastectomy skin scar, excision/revision:\par - Benign skin with dermal scar and focal remote postsurgical site changes\par including suture granulomata.\par \par 3.  Breast, right, tissue expander removal and exchange:\par - Ruptured and deflated  synthetic breast implant/tissue expander\par (macroscopic diagnosis).\par \par 4.  Breast, right tissue, capsulectomy:\par - Benign atrophic fatty breast tissue, fibroadipose connective tissue,\par and skeletal muscle with focal dense linear fibrosis associated with\par early pseudo synovial metaplasia, mild chronic lymphohistiocytic cell\par inflammation, and embedded refractile non-polarizable foreign material;\par consistent with a synthetic breast implant soft tissue capsule.\par \par 5.  Breast, right, tissue expander removal and exchange:\par - Ruptured and deflated  synthetic breast implant/tissue expander\par (macroscopic diagnosis).\par \par Verified by: Jesus Correa M.D.\par (Electronic Signature)\par Reported on: 09/09/22 15:19 EDT, [facility],\par [address] [place] [address]\par Phone: [phone]   Fax: [phone]\par _________________________________________________________________\par \par Clinical Information\par Bilateral breast tissue expander exchange, possible capsulectomy\par COVID (-)\par \par Perioperative Diagnosis\par Implant - based breast reconstruction, history of breast cancer\par \par \par Gross Description\par 1.  The specimen received in formalin is labeled "right mastectomy scar".\par The specimen consists of a tan-white strand of tissue measuring 9 x 0.2\par cm.  The specimen is submitted entirely.  (1 block)\par \par 2.  The specimen received in formalin is labeled "left mastectomy scar".\par The specimen consists of a tan-white strand of tissue measuring 5 x 0.3\par cm.  The specimen is submitted entirely.  (1 block)\par \par 3.  The specimen received is labeled "right tissue expander".  The\par specimen consists of a deflated plastic tissue expander with linear\par defect and the inscription [place] [id number] 450 cc.  Gross description\par only. No sections submitted.\par \par 4.  The specimen received in formalin is labeled "right capsule".  The\par specimen consists of a slender membranous fragment of tissue measuring 13\par cm x 0.5 cm.  The specimen is submitted entirely.  (1 block)\par \par 5.  The specimen received is labeled "left tissue expander".  The specimen\par consists of a deflated plastic tissue expander with linear defect and the\par inscription [place] [id number] 450 cc.  Gross description only. No sections\par submitted.\par \par Specimen was received and underwent gross examination at [facility], [address] [place] [address].\par \par 09/06/2022 20:08:41 EDT    OC_\par \par  Ordered by: TALIB ANNA       Collected/Examined: 06Sep2022 09:30AM       \par Verification Required       Stage: Final       \par  Performed at: [facility]       Resulted: 09Sep2022 03:19PM       Last Updated: 09Sep2022 03:20PM       Accession: [id number]

## 2022-11-18 ENCOUNTER — APPOINTMENT (OUTPATIENT)
Dept: BREAST CENTER | Facility: CLINIC | Age: 47
End: 2022-11-18

## 2022-11-18 PROCEDURE — 99213 OFFICE O/P EST LOW 20 MIN: CPT

## 2022-11-18 NOTE — HISTORY OF PRESENT ILLNESS
[FreeTextEntry1] : Brenda is a 46 year old premenopausal woman with pathologic Stage IIB T2N1M0 ER/IL (+), HER2 (-) IDC of right breast.\par \par s/p US Guided Core Bx - 04/29/2021: (stop-light)\par Right, 10:00 N4, 2.4cm:\par - Invasive poorly differentiated ductal carcinoma with scattered single cell necrosis.\par - Ductal carcinoma in-situ (DCIS), solid type with single cell necrosis, high nuclear grade.\par ER  (+)  95%\par IL  (+)  50%\par HER2  (-)  1.2\par Ki-67    30%\par \par Right, 10:00 N11, 0.4cm: (mini-cork)\par - Benign fibrofatty breast tissue and skeletal muscle with dominant macrocyst wall fragments associated with an attenuated epithelial lining and reactive wall fibrosis consistent with a dilated duct.\par \par \par HISTORICAL RISK FACTORS:\par (-) family hx - breast / ovarian cancer.\par PCP: Dr. Elena Yuan\par Genetic testing did not reveal any mutations. \par \par s/p BL SSM, SNB and Right AND with immediate prepectoral TE reconstruction (Dr. Gallegos) - 06/15/2021 = \par Right (3/25); healing prior bx site in the UOQ with invasive poorly differentiated ductal carcinoma, 3.5 cm, associated with focal necrosis. Non-extensive DCIS, high nuclear grade. Numerous foci of lymphovascular invasion are seen. Focal atypical lobular hyperplasia (ALH).\par Left (0/1); focal atypical lobular hyperplasia (ALH). Fibrofatty breast tissue with proliferative type fibrocystic\par changes.\par \par Dr. Wolfgang Little - (+) chemo\par Completed Adriamycin / Cytoxan every 2 weeks x 4 followed by Taxol every 2 weeks x 4.\par Started on Zoladex for ovarian suppression. \par \par Dr. Elin Shah - currently in planning / sim phase for RT treatment; she had to have left TE deflated due to the fact that it was in the field of radiation. \par \par INTERVAL HISTORY 5/19/22\par Brenda is here for her SIX MONTHS FOLLOW UP VISIT\par \par Since last visit she has completed RT 1/22 and also underwent TLH/BSO without any complications.\par She had her port removed as well  2/10/22\par Started Arimidex recently. C/O joint aches.\par \par SOZO L-Dex Score: right arm -3.7\par Date: 5/19/22\par \par \par 11/18/2022 --\par BRENDA GUZMAN is a 47 year old female patient who presents today in follow up for Stage IIB T2N1M0 ER/IL (+), HER2 (-) IDC of right breast.\par Since her last visit, she has no new breast related complaints. \par She underwent final implant exchange with Dr. Gwen Gallegos on 09/06/2022.\par \par She presents today for evaluation.\par \par

## 2022-11-18 NOTE — PHYSICAL EXAM
[Normocephalic] : normocephalic [Atraumatic] : atraumatic [No Supraclavicular Adenopathy] : no supraclavicular adenopathy [No dominant masses] : no dominant masses in right breast  [No dominant masses] : no dominant masses left breast [No Rashes] : no rashes [No Ulceration] : no ulceration [de-identified] : s/p B/L mastectomy w/ silicone implants in place.\par no suspicious palpable findings. [de-identified] : No axillary lymphadenopathy appreciated. [de-identified] : No axillary lymphadenopathy appreciated.

## 2022-11-18 NOTE — ASSESSMENT
[FreeTextEntry1] : Patient is 47 year old premenopausal woman with pathologic Stage IIB T2N1M0 ER/MT (+), HER2 (-) IDC of right breast. \par She is s/p BL SSM, SNB and Right AND with immediate prepectoral TE reconstruction (Dr. Gallegos) - 06/15/2021.\par \par Dr. Wolfgang Little - (+) chemo\par Completed Adriamycin / Cytoxan every 2 weeks x 4 followed by Taxol every 2 weeks x 4.\par Started on Zoladex for ovarian suppression. \par she has completed RT 1/22 and also underwent TLH/BSO without any complications.\par She had her port removed as well  2/10/22\par Started Arimidex recently. C/O joint aches.\par \par Since her last visit, she has no new breast related complaints. \par She underwent final implant exchange with Dr. Gwen Gallegos on 09/06/2022.\par \par On physical exam, s/p B/L mastectomy w/ silicone implants in place.\par no suspicious palpable findings.\par \par She expressed interest in returning to occupational therapy for lymphedema therapy and I think this is a good idea for her; updated referral will be written.\par \par I spent a total of 20 minutes of face to face time with this patient, greater than 50% of which was spent in counseling and/or coordination of care.\par All of her questions were appropriately answered.\par She knows to call with any concerns.\par \par PLAN:\par - Follow-up for breast exam in 6 months.\par - Continue follow-up with Medical Oncology; Radiation Oncology and Plastic Surgery as scheduled.\par - Referral to occupational therapy \par \par \par

## 2022-11-18 NOTE — REASON FOR VISIT
[Follow-Up: _____] : a [unfilled] follow-up visit [FreeTextEntry1] : h/o Stage IIB T2N1M0 ER/[place] (+), HER2 (-) IDC of right breast.

## 2022-12-09 ENCOUNTER — APPOINTMENT (OUTPATIENT)
Dept: GYNECOLOGIC ONCOLOGY | Facility: CLINIC | Age: 47
End: 2022-12-09

## 2022-12-09 VITALS
HEIGHT: 65 IN | BODY MASS INDEX: 21.16 KG/M2 | WEIGHT: 127 LBS | DIASTOLIC BLOOD PRESSURE: 89 MMHG | SYSTOLIC BLOOD PRESSURE: 132 MMHG

## 2022-12-09 DIAGNOSIS — R32 UNSPECIFIED URINARY INCONTINENCE: ICD-10-CM

## 2022-12-09 PROCEDURE — 99214 OFFICE O/P EST MOD 30 MIN: CPT

## 2022-12-09 NOTE — ASSESSMENT
[FreeTextEntry1] : 47 year old patient of Dr. Little,  (spont abx1) with newly diagnosed Breast Cancer s/p risk reducing surgery, with TLH/BSO 2022. she complains of Painful intercourse.She noted a very sensitive atrophic skin at 6 O'Clock of her introitus. She further reports urinary incontinence and for which she  is also requesting a work up.

## 2022-12-09 NOTE — HISTORY OF PRESENT ILLNESS
[FreeTextEntry1] : 47 year old patient of Dr. Little,  (spont abx1) with newly diagnosed Breast Cancer s/p risk reducing surgery, with TLH/BSO 2022.  She appears to have recovered well. Now here for RLQ pain assessment. Painful intercourse.She noted a very sensitive atrophic skin at 6 O'Clock of her introitus. She further reports urinary incontinence and for which she  is also requesting a work up.\par \par Surgical Pathology Report \par Specimen(s) Submitted\par Uterus, cervix, bilateral tubes and ovaries\par \par Final Diagnosis\par Uterus, cervix, bilateral tubes and ovaries; total laparoscopic\par hysterectomy and BSO:\par - Benign leiomyoma.\par - The myometrium also showing superficial foci of adenomyosis.\par \par - Benign endometrial polyp and inactive endometrium with cystic atrophy,\par no hyperplasia or atypia seen.\par - Cervix showing chronic cervicitis with Nabothian cysts.\par - Benign ovary showing corpus albicans and few benign glandular\par inclusions, no neoplasm identified.\par - Benign fallopian tubes with few paratubal simple cysts.\par \par Note: There is no histologic evidence of metastatic carcinoma identified\par in this specimen. Clinical correlation is recommended. The patient's\par prior specimen report (83-YP-) was noticed.\par Verified by: Cristofer Fleming M.D.\par

## 2022-12-29 ENCOUNTER — OUTPATIENT (OUTPATIENT)
Dept: OUTPATIENT SERVICES | Facility: HOSPITAL | Age: 47
LOS: 1 days | End: 2022-12-29

## 2022-12-29 ENCOUNTER — APPOINTMENT (OUTPATIENT)
Dept: HEMATOLOGY ONCOLOGY | Facility: CLINIC | Age: 47
End: 2022-12-29
Payer: COMMERCIAL

## 2022-12-29 VITALS
SYSTOLIC BLOOD PRESSURE: 140 MMHG | BODY MASS INDEX: 21.33 KG/M2 | OXYGEN SATURATION: 99 % | HEART RATE: 75 BPM | WEIGHT: 128 LBS | RESPIRATION RATE: 18 BRPM | DIASTOLIC BLOOD PRESSURE: 84 MMHG | HEIGHT: 65 IN | TEMPERATURE: 98.6 F

## 2022-12-29 DIAGNOSIS — M67.40 GANGLION, UNSPECIFIED SITE: Chronic | ICD-10-CM

## 2022-12-29 DIAGNOSIS — Z90.49 ACQUIRED ABSENCE OF OTHER SPECIFIED PARTS OF DIGESTIVE TRACT: Chronic | ICD-10-CM

## 2022-12-29 DIAGNOSIS — Z98.890 OTHER SPECIFIED POSTPROCEDURAL STATES: Chronic | ICD-10-CM

## 2022-12-29 DIAGNOSIS — Z90.13 ACQUIRED ABSENCE OF BILATERAL BREASTS AND NIPPLES: Chronic | ICD-10-CM

## 2022-12-29 PROCEDURE — 99214 OFFICE O/P EST MOD 30 MIN: CPT

## 2022-12-29 NOTE — CONSULT LETTER
[Dear  ___] : Dear  [unfilled], [Consult Letter:] : I had the pleasure of evaluating your patient, [unfilled]. [Please see my note below.] : Please see my note below. [Consult Closing:] : Thank you very much for allowing me to participate in the care of this patient.  If you have any questions, please do not hesitate to contact me. [Sincerely,] : Sincerely, [DrMele  ___] : Dr. 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

## 2022-12-29 NOTE — HISTORY OF PRESENT ILLNESS
[de-identified] : This is a 45 premenopausal woman being seen for wt loss.\par Pt has Lost 15 lbs in the last 6 mths. \par She has a good appetite.\par C/o having drenching  night sweats more often.\par She is known to have enlarged  neck Lns, saw ENt however could not get FNA as the nodes were too small.\par Had recent change in bowel habits over the last month or so with constipation and diarrhea. She has a follow up with GI.\par Has heavy periods, has fibroids, will see GYN \par She has an enlarged LNs in the groin\par colonoscopy done in 12/2/19 showed 7mm ascending colon polyp.\par EGD non erosive gastritis [de-identified] : ONCOTYPE DX RS 32.\par RR at 7 yrs 25%.\par With chemo benefit from chemo 15% [de-identified] : 5/11/21\par Pt is here for follow up.\par She is here to discuss results and further plan of care.\par She has completed breast biopsy.\par She has surgery appt today.\par She also has a GI appt later in the week\par \par 6/28/21\par Pt is here for follow up.\par She is s/p B/L mastectomy with TE insertion.\par There were no post op complications.\par She still has a drain in place.\par She is here to discuss adjuvant therapy.\par Pt still is menstruating.\par \par 7/29/21\par pt is here for follow up.\par She is s/p 1 cycle of AC. She tolerated it well. No N, V, D, mouth sores, fever.\par Has gastric reflux.\par Had bleeding post Zoladex injection\par \par 8/12/2021\par Patient is here to follow up for breast cancer\par She is s/p cycle #2 Adriamycin/Cytoxan, tolerating well\par She is also on Zoladex\par She feels well today, appetite is good\par She denies nausea, vomiting, or any chest wall concerns\par She c/o of mild spasm after voiding even after completing Cipro x 5days\par \par 8/25/21\par Pt is here for follow up.\par Tolerating chemo with some SE. Nausea controlled with Zofran. Had UTI few weeks ago, treated with ABX, resolved.\par No menstrual bleeding since starting Zoladex.\par Will be seeing Dr Suggs.\par \par 9/9/21\par pt is here for follow up.\par She is feeling well.\par No urinary symptoms. denies fever, mouth sores, N, V, D\par \par 9/21/21\par Pt is here for follow up.\par She is feeling well. She offers no new complaints.\par No urinary symptoms. denies fever, mouth sores, N, V, D\par CBC today shows wbc=2.95, ANC=1.56, Hgb=9.5, Iuzu=586.\par \par 10/5/21\par Pt is here for follow up.\par C/O aches and pains since being on Taxol.\par No fever.\par \par 12/2/21\par Pt is here for follow up.\par C/O dizziness. It seems to be positional, improves with Meclizine.\par She saw her cardio and metoprolol was continued.\par Has mild neuropathy.\par Radiation has started this week.\par C/O insomnia\par \par 2/17/22\par Pt is here for follow up.\par Since last visit she has completed RT and also underwent TLH/BSO without any complications.\par She had her port removed as well\par Started Arimidex recently. C/O joint aches.\par Concerned about thyroid nodule, gall bladder polyp and osteopenia\par \par 6/16/22\par  Patient here for follow up, feeling well.  She denies any new complaints.  Patient denies any new palpable breast lumps or pain, denies skin changes, denies nipple discharge.  Patient denies cough, shortness of breath, denies fever, denies bone pain.\par Compliant with Arimidex.\par Taking ca + D.\par had labs done recently, reviewed with pt\par \par 12/29/22\par Patient is here to follow up for breast cancer.\par She is compliant with Anastrazole, tolerating well except for stiffness of joints and insomnia. \par She denies any new chest wall complaints. \par She is taking Calcium + Vitamin D.\par She had started doing yoga in the last 3 weeks after she was cleared by Dr Gallegos post implant surgery on 9/6/22.\par She follows up with chiropractor.\par She has elevated CHELLE that she was evaluated by rheumatology but is not interested of taking meds from rheum.\par \par \par \par

## 2022-12-29 NOTE — ASSESSMENT
[FreeTextEntry1] : Patient is a 46 year old premenopausal woman with pathologic stage IIB T2N1M0 HR+, HER2 negative IDC of right breast.\par \par ONCOTYPE DX RS 32.\par RR at 7 yrs 25% with endocrine therapy\par  benefit from chemo 15%\par \par Genetic testing did not reveal any mutations. \par \par PT IS S/P ADJUVANT CHEMO WITH AC-T completed in 11/21\par S/P RT in 1/22\par S/P TLH/BSO in 1/22\par \par Started Anastrazole last week of Jan 2022 for at least 5 years or longer.\par Bone density on 12/13/22 showed osteopenia of the AP spine with T score -1.3\par \par # Thyroid nodule\par # Gall bladder polyp\par \par RECOMMENDATIONS:\par Previous notes reviewed and all relevant radiological and laboratory results discussed with Dr Little and were communicated those to the patient.\par \par -- Offered patient to switch to Exemestane but she prefers to take  Arimidex 1 mg daily, eRx sent.\par The side effects from such treatment were discussed in detail including but not limited to hot flashes, weight gain, hair thinning, arthralgia, myalgia, bone loss, increased risk of osteoporotic fractures and thrombo-embolism.\par She will continue to take Calcium + VItamin D daily while on AI. \par -- Pt advised on healthy life style and exercise including yoga and to call us if her symptoms are not getting better.\par -- Continue to follow up with PCP, gyne, and GI as recommended.\par \par #Osteopenia\par -- Continue Vitamin D and Calcium supplement.\par -- Advised weight bearing exercises.\par -- Next bone density is due on 12/2023.\par \par #Gallbladder polyp\par -- Continue abdominal US as recommended by GI.\par -- Follow up with GI\par \par All her concerns were addressed during the visit.\par RTC in 4 months\par \par Case was seen and discussed with Dr. Little who agreed with assessment and plan.\par

## 2022-12-29 NOTE — REVIEW OF SYSTEMS
[Joint Stiffness] : joint stiffness [Insomnia] : insomnia [Negative] : Genitourinary [Night Sweats] : no night sweats [Fatigue] : no fatigue [Swollen Glands] : no swollen glands

## 2022-12-29 NOTE — RESULTS/DATA
[FreeTextEntry1] : ACC: [id number]     EXAM:  US ABDOMEN RT UPR QUADRANT\par PROCEDURE DATE:  09/14/2022\par \par INTERPRETATION:  CLINICAL INFORMATION: Evaluate gallbladder polyp\par \par COMPARISON: Abdominal ultrasound 9/14/2021\par \par TECHNIQUE: Sonography of the right upper quadrant.\par \par FINDINGS:\par Liver: Normal echogenicity and contour.\par Bile ducts: Normal caliber. Common bile duct measures 3 mm.\par Gallbladder: Multiple subcentimeter nonmobile echogenic foci along the mucosal surface of the gallbladder, largest 8 mm and 4 mm, likely gallbladder polyps.\par Pancreas: Visualized portions are within normal limits.\par Right kidney: 9.5 cm. No hydronephrosis.\par Ascites: None.\par \par IMPRESSION:\par Multiple gallbladder polyps measuring up to 8 mm, overall unchanged.\par \par

## 2022-12-29 NOTE — PHYSICAL EXAM
No [Fully active, able to carry on all pre-disease performance without restriction] : Status 0 - Fully active, able to carry on all pre-disease performance without restriction [Normal] : affect appropriate [de-identified] : Bilateral mastectomy with reconstruction; breast and chest wall exam is unrevealing

## 2022-12-31 DIAGNOSIS — C50.411 MALIGNANT NEOPLASM OF UPPER-OUTER QUADRANT OF RIGHT FEMALE BREAST: ICD-10-CM

## 2022-12-31 DIAGNOSIS — Z98.890 OTHER SPECIFIED POSTPROCEDURAL STATES: ICD-10-CM

## 2022-12-31 DIAGNOSIS — C50.919 MALIGNANT NEOPLASM OF UNSPECIFIED SITE OF UNSPECIFIED FEMALE BREAST: ICD-10-CM

## 2022-12-31 DIAGNOSIS — Z79.811 LONG TERM (CURRENT) USE OF AROMATASE INHIBITORS: ICD-10-CM

## 2022-12-31 DIAGNOSIS — C50.211 MALIGNANT NEOPLASM OF UPPER-INNER QUADRANT OF RIGHT FEMALE BREAST: ICD-10-CM

## 2023-02-13 ENCOUNTER — APPOINTMENT (OUTPATIENT)
Dept: PLASTIC SURGERY | Facility: CLINIC | Age: 48
End: 2023-02-13
Payer: COMMERCIAL

## 2023-02-13 PROCEDURE — 99214 OFFICE O/P EST MOD 30 MIN: CPT

## 2023-02-13 NOTE — DATA REVIEWED
[FreeTextEntry1] : Pathology             Final\par \par No Documents Attached\par \par [facility] Accession Number : [id number]\par Patient:   LADONNA GUZMAN\par \par Accession:                             [id number]\par \par Collected Date/Time:                   9/6/2022 09:30 EDT\par Received Date/Time:                    9/6/2022 12:50 EDT\par \par Surgical Pathology Report - Auth (Verified)\par \par Specimen(s) Submitted\par 1  Right mastectomy scar\par 2  Left mastectomy scar\par 3  Right tissue expander\par 4  Right capsule\par 5  Left tissue expander\par \par Final Diagnosis\par \par 1.  Breast, right mastectomy skin scar, excision/revision:\par - Benign skin with dermal scar.\par \par 2.  Breast, left mastectomy skin scar, excision/revision:\par - Benign skin with dermal scar and focal remote postsurgical site changes\par including suture granulomata.\par \par 3.  Breast, right, tissue expander removal and exchange:\par - Ruptured and deflated  synthetic breast implant/tissue expander\par (macroscopic diagnosis).\par \par 4.  Breast, right tissue, capsulectomy:\par - Benign atrophic fatty breast tissue, fibroadipose connective tissue,\par and skeletal muscle with focal dense linear fibrosis associated with\par early pseudo synovial metaplasia, mild chronic lymphohistiocytic cell\par inflammation, and embedded refractile non-polarizable foreign material;\par consistent with a synthetic breast implant soft tissue capsule.\par \par 5.  Breast, right, tissue expander removal and exchange:\par - Ruptured and deflated  synthetic breast implant/tissue expander\par (macroscopic diagnosis).\par \par Verified by: Jesus Correa M.D.\par (Electronic Signature)\par Reported on: 09/09/22 15:19 EDT, [facility],\par [address] [place] [address]\par Phone: [phone]   Fax: [phone]\par _________________________________________________________________\par \par Clinical Information\par Bilateral breast tissue expander exchange, possible capsulectomy\par COVID (-)\par \par Perioperative Diagnosis\par Implant - based breast reconstruction, history of breast cancer\par \par \par Gross Description\par 1.  The specimen received in formalin is labeled "right mastectomy scar".\par The specimen consists of a tan-white strand of tissue measuring 9 x 0.2\par cm.  The specimen is submitted entirely.  (1 block)\par \par 2.  The specimen received in formalin is labeled "left mastectomy scar".\par The specimen consists of a tan-white strand of tissue measuring 5 x 0.3\par cm.  The specimen is submitted entirely.  (1 block)\par \par 3.  The specimen received is labeled "right tissue expander".  The\par specimen consists of a deflated plastic tissue expander with linear\par defect and the inscription [place] [id number] cc.  Gross description\par only. No sections submitted.\par \par 4.  The specimen received in formalin is labeled "right capsule".  The\par specimen consists of a slender membranous fragment of tissue measuring 13\par cm x 0.5 cm.  The specimen is submitted entirely.  (1 block)\par \par 5.  The specimen received is labeled "left tissue expander".  The specimen\par consists of a deflated plastic tissue expander with linear defect and the\par inscription [place] [id number] cc.  Gross description only. No sections\par submitted.\par \par Specimen was received and underwent gross examination at [facility], [address] [place] [address].\par \par 09/06/2022 20:08:41 EDT    OC_\par \par  Ordered by: TALIB ANNA       Collected/Examined: 06Sep2022 09:30AM       \par Verification Required       Stage: Final       \par  Performed at: [facility]       Resulted: 09Sep2022 03:19PM       Last Updated: 09Sep2022 03:20PM       Accession: [id number]

## 2023-02-13 NOTE — PHYSICAL EXAM
[de-identified] : well-appearing, NAD [de-identified] : ATNC [de-identified] : Supple [de-identified] : Non labored [de-identified] : Bilateral breasts soft, mastectomy flaps well perfused with good capp refill <2 secs. B/l incisions well healed  without evidence of seroma/hematoma/cellulitis. Suture tails removed. Implants in good position, excellent symmetry, slight anterior volume deficiency/shape [de-identified] : Alert and oriented

## 2023-02-13 NOTE — ASSESSMENT
[FreeTextEntry1] : 47 yo F with right breast cancer s/p BL SSM, Rt axillary LN dissection (Dr. Macias) with immediate prepectoral TE reconstruction (filled to 100cc) with AlloDerm (6/15/21). Doing very well. \par s/p XRT right breast (completed 1/4/22)\par \par 5 months s/p BL TE exchange to silicone implants and capsulotomies\par \par Recommend AFG BL breast, donor site BL lower back\par \par -Regarding the breast with the contour deformity, autologous fat grafting is an option to fill in the volume deficiency with the patient's own fat harvested from the back donor site where there is adequate fat. The alternative treatment is to observe further with possible worsening contour depression and nipple retraction and treat at a later time.\par -The symmetry procedure benefits, risks, and alternatives were discussed in detail. Regarding mastopexy and/or breast reduction, the risks include but not limited to bleeding, infections, hematoma, seroma, poor wound healing and/or separation, scarring, possible hypertrophic scar/keloid formation, asymmetry, contour deformity, possible need for unplanned surgery or revision surgery, and dissatisfaction with outcomes.\par Regarding fat grafting, the risks include but are not limited to infection, fat necrosis, need for additional grafting,  loss of volume, donor site morbidity, infection, seroma, contour irregularity, need for future revision surgery and dissatisfaction with outcome.\par -All of her questions were answered to her apparent satisfaction. She elected to proceed with AFG BL breast.\par I will schedule her for same-day surgery. \par - Rx Duricef for implant ppx\par - Continue Singulair x 6 months; new rx given\par - All post op instructions reviewed, all questions answered.  Informed consent obtained\par \par Photos were taken with patient permission\par \par Due to COVID-19, pre-visit patient instructions were explained to the patient and their symptoms were checked upon arrival. Masks were used by the healthcare provider and staff and the examination room was cleaned after the patient visit concluded\par \par \par

## 2023-02-13 NOTE — HISTORY OF PRESENT ILLNESS
[FreeTextEntry1] : 44 yo   F with PMHx of anxiety/depression, HTN, GERD, asthma who was recently dx with RIGHT invasive ductal carcinoma and DCIS 2.4 cm @ 10:00 (ER+/WY+/HER2(-)) after palpable mass in right breast.  Denies breast pain, nipple discharge and retraction.  Here with friend, Rukhsana.\par \par Patient is learning toward bilateral mastectomy.  She is inquiring about immediate pre-pec implant based reconstruction.  She would like simplest recovery. 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 lives alone (2 cats); post-op assistance from friends\par \par Wears 34B, cup not filled.  She would like a full B in reconstructive volume.\par Recent lost weight 8 lbs. currently 118 lbs.  Mother passed away recently 2 months ago.\par \par Interval hx (21). Patient presents today POD#8 s/p BL SSM, Rt axillary dissection with immediate prepectoral TE reconstruction (filled to 100cc) with AlloDerm. Doing very well with adequate pain management. Taking oral antibiotics as prescribed. Denies any f/c or bleeding. Drains all functional with appropriate output. Rt axillary drain with minimal 5-7 cc daily. \par \par Interval hx (21). Patient presents today POD#13 s/p BL SSM, Rt axillary dissection with immediate prepectoral TE reconstruction (filled to 100cc) with AlloDerm. Doing well with no new concerns. Denies any f/c or bleeding. Drains functional with decreasing output as expected. \par \par Interval hx (21): Pt presents today POD #17 s/p BL SSM, Rt axillary dissection with immediate prepectoral TE reconstruction (filled to 100cc) with AlloDerm. Feeling well after first TE fill. Remaining right drain output: . Denies f/c, redness, swelling, CP/SOB, N/V, or calf pain.\par \par Interval hx (21): Pt presents today POD #22 s/p BL SSM, Rt axillary dissection with immediate prepectoral TE reconstruction (filled to 100cc) with AlloDerm. Saw Dr. Macias  who discussed CTX x12 weeks and recommended RT consult given 3 positive LNs. \par \par Interval hx (21): Pt presents today POD #29 s/p BL SSM, Rt axillary dissection with immediate prepectoral TE reconstruction (filled to 100cc) with AlloDerm. Continues to do well with no significant discomfort. Had chemoport placed on Monday and scheduled to commence CTX later this week. Denies any f/c, bleeding or pressure after TE fills. \par \par Interval hx (21):  Here POD#41 s/p BL immediate prepec TE/Alloderm.  Started on chemotherapy, ; next cycle on .  Pt reports she will need RIGHT BREAST XRT.  Here for serial TE fill.   Denies fevers, chills, redness, swelling.\par \par Interval hx (21):  Here for POD#56 s/p BL prepec TE/Alloderm.  s/p 2 cycles of chemo. s/p chemoport.\par \par Interval hx (21):  Pt presents today as instructed by her oncologist for evaluation of left medial breast "bubble". Denies any pain, f/c, skin changes, open wounds or drainage. Tolerating chemotx with no significant complaints. \par \par Interval hx (21). Patient presents today for left TE overfill. Doing well with no new complaints. Tolerating chemotx with alopecia as expected, otherwise no significant adverse effects. \par \par Interval hx (21): Pt presents today for follow up. After [facility] planning, Dr. Shah advised to have left TE delfated for XRT field optimization for right XRT.  no new health issues; completed chemotherapy 3 weeks ago.  no fevers, chills, redness, swelling.\par \par Interval hx (11/15/21):  Went to radiation simulation today.  Left breast still within field and will require additional TE deflation for XRT.  No other complaints\par \par Interval hx (22): Pt presents today to refill left TE as she has completed right breast XRT as of 22. Tolerated XRT very well and applying moisturizer 3x/day.\par \par Interval hx (22): Pt presents today for left TE fill. Doing well and happy with right breast TE vol. Scheduling PAUL/BSO with Dr. Suggs next month. \par \par Interval hx (22): Here today for post-XRT breast exam; completed XRT 22.  she is also s/p lap PAUL (22), IR removed chemo-port 2/10/22.  She is inquiring about possible additional TE fill/upsize.\par \par Interval hx (22): Pt presents today for f/u, completed XRT 22 and is s/p lap PAUL (22), IR removed chemo-port 2/10/22.  Reports that right breast only feels tight during yoga.  She is here to discuss reconstructive options for next stage.  She expressed wish for implant-based reconstruction trial before undergoing LYNDA flap in the future.\par \par Interval hx (22): s/p BL pre-pec TE breast reconstruction (6/15/21), s/p XRT 22 and is s/p lap PAUL (22), IR removed chemo-port 2/10/22.  Now 7 months s/p XRT, here to discuss TE exchange to silicone implant.  She has been going to OT from right shoulder ROM 2/2 capsular contracture.\par \par Interval hx (22): Pt is POD# 6 s/p removal of b/l Nighat, R partial capsulectomy, B/l capsulotomies,  with b/l silicone implant insertion (375ccs). Denies fever/chills/cp/sob/le pain or swelling. Only  taking Tylenol prn for discomfort. Wearing surgical bra . Last day abx tmw. Started singulair daily. \par \par Interval hx (22):  POD#13 s/p removal of b/l Nighat, R partial capsulectomy, B/l capsulotomies,  with b/l silicone implant insertion (375ccs).  No fevers, chills, breast swelling. On singulair.\par \par Interval hx (22): 8 weeks removal of b/l Nighat, R partial capsulectomy, B/l capsulotomies,  with b/l silicone implant insertion (375ccs).  No fevers, chills, breast swelling. On singulair for refill\par \par Interval hx (23):  5 months s/p BL TE exchange to silicone implants.. On singulair.  She feels the right breas is softer than last visit.  She is her to discuss revision options.  c/o BL lowerpole volume deficiency, unable to fill bra and looks flat.

## 2023-03-07 ENCOUNTER — APPOINTMENT (OUTPATIENT)
Dept: RADIATION ONCOLOGY | Facility: HOSPITAL | Age: 48
End: 2023-03-07

## 2023-03-14 ENCOUNTER — APPOINTMENT (OUTPATIENT)
Dept: RADIATION ONCOLOGY | Facility: HOSPITAL | Age: 48
End: 2023-03-14
Payer: COMMERCIAL

## 2023-04-12 ENCOUNTER — APPOINTMENT (OUTPATIENT)
Dept: CARDIOLOGY | Facility: CLINIC | Age: 48
End: 2023-04-12
Payer: COMMERCIAL

## 2023-04-12 VITALS — TEMPERATURE: 97.6 F | BODY MASS INDEX: 21.16 KG/M2 | WEIGHT: 127 LBS | HEIGHT: 65 IN

## 2023-04-12 VITALS — HEART RATE: 66 BPM | DIASTOLIC BLOOD PRESSURE: 80 MMHG | SYSTOLIC BLOOD PRESSURE: 126 MMHG

## 2023-04-12 PROCEDURE — 99214 OFFICE O/P EST MOD 30 MIN: CPT | Mod: 25

## 2023-04-12 PROCEDURE — 93000 ELECTROCARDIOGRAM COMPLETE: CPT

## 2023-04-12 RX ORDER — MONTELUKAST 10 MG/1
10 TABLET, FILM COATED ORAL
Qty: 90 | Refills: 1 | Status: DISCONTINUED | COMMUNITY
Start: 2023-02-13 | End: 2023-04-12

## 2023-04-12 NOTE — HISTORY OF PRESENT ILLNESS
[FreeTextEntry1] : The patient has Stage II breast CA . She has had bilateral Mastectomies and reconstruction surgeries . She has implants but needs some revisions . The patient has not had chest pain or SOB

## 2023-04-12 NOTE — CARDIOLOGY SUMMARY
[de-identified] : 5- NSR Normal ECG . \par 11- NSR Normal ECG \par 5- NSR Normal ECG  [de-identified] : 12-7-2021 24 hour ambulatory BP monitor . Mild systolic HTN significant diastolic hypertension . <10% night time dip  [de-identified] : 6-9-2021 ETT Echo Completed 7 minutes and 31 seconds . No echo ischemia .abnormal ECG response  [de-identified] : 8- Normal Lv systolic function Trace MR trace TR \par 8-8-2022 Normal Lv systolic function Trace Mr mild TR

## 2023-04-12 NOTE — ASSESSMENT
[FreeTextEntry1] : The patient has Stage II breast CA . She has had Anthracyclines in the past for chemo  .LV systolic function is normal . The patient has had B/L Mastectomies and breast implants and will need revsions . The patient is followed by Dr. [facility] [employer] . The patient has HTN and this is controlled on Metoprolol and Norvasc. The patient is going for revision of her breast implants . The patient is an intermediate cardiac risk undergoing an intermediate risk procedure.

## 2023-04-19 ENCOUNTER — OUTPATIENT (OUTPATIENT)
Dept: OUTPATIENT SERVICES | Facility: HOSPITAL | Age: 48
LOS: 1 days | End: 2023-04-19
Payer: COMMERCIAL

## 2023-04-19 ENCOUNTER — APPOINTMENT (OUTPATIENT)
Dept: RADIATION ONCOLOGY | Facility: HOSPITAL | Age: 48
End: 2023-04-19
Payer: COMMERCIAL

## 2023-04-19 VITALS
SYSTOLIC BLOOD PRESSURE: 128 MMHG | HEART RATE: 66 BPM | BODY MASS INDEX: 21.53 KG/M2 | WEIGHT: 129.38 LBS | RESPIRATION RATE: 16 BRPM | DIASTOLIC BLOOD PRESSURE: 80 MMHG | TEMPERATURE: 97.5 F | OXYGEN SATURATION: 99 %

## 2023-04-19 DIAGNOSIS — M67.40 GANGLION, UNSPECIFIED SITE: Chronic | ICD-10-CM

## 2023-04-19 DIAGNOSIS — Z98.890 OTHER SPECIFIED POSTPROCEDURAL STATES: Chronic | ICD-10-CM

## 2023-04-19 DIAGNOSIS — Z90.49 ACQUIRED ABSENCE OF OTHER SPECIFIED PARTS OF DIGESTIVE TRACT: Chronic | ICD-10-CM

## 2023-04-19 DIAGNOSIS — Z90.13 ACQUIRED ABSENCE OF BILATERAL BREASTS AND NIPPLES: Chronic | ICD-10-CM

## 2023-04-19 DIAGNOSIS — C50.411 MALIGNANT NEOPLASM OF UPPER-OUTER QUADRANT OF RIGHT FEMALE BREAST: ICD-10-CM

## 2023-04-19 PROCEDURE — 99213 OFFICE O/P EST LOW 20 MIN: CPT

## 2023-04-19 PROCEDURE — 99213 OFFICE O/P EST LOW 20 MIN: CPT | Mod: TC

## 2023-04-24 NOTE — LETTER CLOSING
[Consult Closing:] : Thank you for allowing me to participate in the care of this patient.  If you have any questions, please do not hesitate to contact me. [Sincerely yours,] : Sincerely yours, [FreeTextEntry3] : Elin Shah M.D. \par \par Electronically proofread and signed by:  Elin Shah MD\par Attending, Department of Radiation Medicine\par [facility]\par \par CC: Dr. Macias

## 2023-04-24 NOTE — PHYSICAL EXAM
[Sclera] : the sclera and conjunctiva were normal [Heart Rate And Rhythm] : heart rate and rhythm were normal [Heart Sounds] : normal S1 and S2 [Nondistended] : nondistended [Abdomen Tenderness] : non-tender [Normal] : oriented to person, place and time, the affect was normal, the mood was normal and not anxious [de-identified] : She is examined in both the upright and supine positions.  Bilateral reconstructed breasts.  No palpable nodules on chestwall.

## 2023-04-24 NOTE — HISTORY OF PRESENT ILLNESS
[FreeTextEntry1] : LADONNA GUZMAN returns to clinic in follow up visit.  As you know, LADONNA GUZMAN is a 47 year old female with invasive ductal carcinoma of the right breast, upper outer quadrant, G3, ER /VA positive / HER 2 negative, [id number], Stage IIA.  She is s/p bilateral mastectomies and TE reconstruction, right ALND and chemotherapy.  The patient was treated to the right reconstructed breast and regional nodes using a 3D mono-isocentric technique.  The patient tolerated treatments quite well. The patient had the expected side effects of skin erythema and fatigue.  The patient received 5000 cGy to the right reconstructed breast and right Supraclavicular nodes from 11/29/2021  through 1/4/2022.  I last saw her in September 2022. On 9/6/2022, she had  tissue expander removal and exchange for permanent silicone implants She recently visited with Dr. Gallegos to discuss revision options for volume deficiency.  In the interim, the patient states she feels well. Denies any pain or fatigue. Takes anastrazole daily. \par \par Upcoming appointments:\par Dr. Little 12/29/2022\par Dr. Gurrola 11/18/2022\par Dr. Gallegos 9/19/2022\par \par

## 2023-04-24 NOTE — DISEASE MANAGEMENT
[FreeTextEntry4] : invasive ductal carcinoma of the right breast, upper outer quadrant, G3, ER /ME positive / HER 2 negative  [TTNM] : 2 [NTNM] : 1a [MTNM] : 0

## 2023-04-25 ENCOUNTER — APPOINTMENT (OUTPATIENT)
Dept: BREAST CENTER | Facility: CLINIC | Age: 48
End: 2023-04-25
Payer: COMMERCIAL

## 2023-04-25 ENCOUNTER — APPOINTMENT (OUTPATIENT)
Dept: GYNECOLOGIC ONCOLOGY | Facility: CLINIC | Age: 48
End: 2023-04-25
Payer: COMMERCIAL

## 2023-04-25 VITALS
HEIGHT: 65 IN | SYSTOLIC BLOOD PRESSURE: 127 MMHG | BODY MASS INDEX: 21.49 KG/M2 | WEIGHT: 129 LBS | DIASTOLIC BLOOD PRESSURE: 82 MMHG

## 2023-04-25 VITALS
SYSTOLIC BLOOD PRESSURE: 131 MMHG | BODY MASS INDEX: 21.49 KG/M2 | WEIGHT: 129 LBS | DIASTOLIC BLOOD PRESSURE: 95 MMHG | HEIGHT: 65 IN

## 2023-04-25 DIAGNOSIS — Z91.89 OTHER SPECIFIED PERSONAL RISK FACTORS, NOT ELSEWHERE CLASSIFIED: ICD-10-CM

## 2023-04-25 DIAGNOSIS — N94.10 UNSPECIFIED DYSPAREUNIA: ICD-10-CM

## 2023-04-25 DIAGNOSIS — C50.411 MALIGNANT NEOPLASM OF UPPER-OUTER QUADRANT OF RIGHT FEMALE BREAST: ICD-10-CM

## 2023-04-25 DIAGNOSIS — C50.919 MALIGNANT NEOPLASM OF UNSPECIFIED SITE OF UNSPECIFIED FEMALE BREAST: ICD-10-CM

## 2023-04-25 PROCEDURE — 99214 OFFICE O/P EST MOD 30 MIN: CPT

## 2023-04-25 PROCEDURE — 93702 BIS XTRACELL FLUID ANALYSIS: CPT | Mod: NC

## 2023-04-25 PROCEDURE — 99213 OFFICE O/P EST LOW 20 MIN: CPT

## 2023-04-25 NOTE — HISTORY OF PRESENT ILLNESS
[FreeTextEntry1] : Patient is a 47F with history of right IDc (+/+/-) s/p bilateral SSM, SLNB and right ALND with TE recon on 6/15/2021 with final pathology showing: Right: 3/25, UOQ with invasive poorly differentiated ductal carcinoma, 3.5 cm, associated with focal necrosis. Non-extensive DCIS, high nuclear grade. Numerous foci of lymphovascular invasion are seen. Focal atypical lobular hyperplasia (ALH).\par Left (0/1); focal atypical lobular hyperplasia (ALH).\par T2N1M0 \par \par Completed Adriamycin / Cytoxan every 2 weeks x 4 followed by Taxol every 2 weeks x 4.\par Completed radiation\par Underwent TLH/BSO without any complications.\par Started Arimidex recently\par She underwent final implant exchange with Dr. Gwen Gallegos on 09/06/2022.\par \par HISTORICAL RISK FACTORS:\par (-) family hx - breast / ovarian cancer.\par Genetic testing did not reveal any mutations. \par \par \par Her history is as follows:\par s/p US Guided Core Bx - 04/29/2021: (stop-light)\par Right, 10:00 N4, 2.4cm:\par - Invasive poorly differentiated ductal carcinoma with scattered single cell necrosis.\par - Ductal carcinoma in-situ (DCIS), solid type with single cell necrosis, high nuclear grade.\par ER  (+)  95%\par CT  (+)  50%\par HER2  (-)  1.2\par Ki-67    30%\par \par Right, 10:00 N11, 0.4cm: (mini-cork)\par - Benign fibrofatty breast tissue and skeletal muscle with dominant macrocyst wall fragments associated with an attenuated epithelial lining and reactive wall fibrosis consistent with a dilated duct.\par \par s/p BL SSM, SNB and Right AND with immediate prepectoral TE reconstruction (Dr. Gallegos) - 06/15/2021 = \par Right (3/25); healing prior bx site in the UOQ with invasive poorly differentiated ductal carcinoma, 3.5 cm, associated with focal necrosis. Non-extensive DCIS, high nuclear grade. Numerous foci of lymphovascular invasion are seen. Focal atypical lobular hyperplasia (ALH).\par Left (0/1); focal atypical lobular hyperplasia (ALH). Fibrofatty breast tissue with proliferative type fibrocystic\par changes.\par \par Completed Adriamycin / Cytoxan every 2 weeks x 4 followed by Taxol every 2 weeks x 4.\par Completed radiation\par \par Underwent TLH/BSO without any complications.\par Started Arimidex recently\par \par She underwent final implant exchange with Dr. Gwen Gallegos on 09/06/2022.\par \par SOZO  right arm -3.7 (5/19/22)\par \par Patient denies any current complaints. no new changes.\par \par

## 2023-04-25 NOTE — PHYSICAL EXAM
[Normocephalic] : normocephalic [EOMI] : extra ocular movement intact [No Supraclavicular Adenopathy] : no supraclavicular adenopathy [No Cervical Adenopathy] : no cervical adenopathy [Examined in the supine and seated position] : examined in the supine and seated position [No dominant masses] : no dominant masses in right breast  [No dominant masses] : no dominant masses left breast [Breast Mass Right Breast ___cm] : no masses [Breast Mass Left Breast ___cm] : no masses [No Axillary Lymphadenopathy] : no left axillary lymphadenopathy [No Rashes] : no rashes [No Ulceration] : no ulceration [de-identified] : Nl respirations [de-identified] : s/p bilateral mastectomies [de-identified] : s/p bilateral mastectomies

## 2023-04-25 NOTE — ASSESSMENT
[FreeTextEntry1] : Patient has a history of right IDC (+/+/-) s/p bilateral SSM, SLNB and right ALND in 6/2021 with final pathology showing 3/25 nodes with 3.5cm of IDC.  She underwent chemo with AC-T and adjuvant radiation.  She had a TLH/BSO.  Had final implant exchange in 9/2022.  Is on Arimidex.  She last saw medical oncology in 12/2022 with a follow up recommended in 4 months. She saw Plastic surgery with plan for possible AFG bilateral breasts.  We discussed her cancer history and importance of following up for clinical breast exams. Patient understands.  Bio-impedance testing performed.  All questions and concerns were answered in detail.  Patient is for follow up in 6 months for CBE.  She is to follow up with medical oncology and radiation oncology as scheduled.  She is to follow up with plastic surgery as scheduled.  Right sozo = -3.5 (-3.7 in 5/2022)  Total time spent on encounter was greater than 30 minutes , which included face to face time with the patient, performing an exam, reviewing previous medical records, reviewing current imaging/ pathology, documenting in patient record and coordinating care/imaging. Greater than 50% of the encounter was spent on counseling and coordination of her breast issue.

## 2023-04-25 NOTE — HISTORY OF PRESENT ILLNESS
[FreeTextEntry1] : 47 year old patient of Dr. Little,  (spont abx1) with newly diagnosed Breast Cancer s/p risk reducing surgery, with TLH/BSO 2022. She appears to have recovered well. \par \par Final pathology (22)\par Surgical Pathology Report \par Specimen(s) Submitted\par Uterus, cervix, bilateral tubes and ovaries\par \par Final Diagnosis:\par Uterus, cervix, bilateral tubes and ovaries; total laparoscopic hysterectomy and BSO:\par - Benign leiomyoma.\par - The myometrium also showing superficial foci of adenomyosis.\par \par - Benign endometrial polyp and inactive endometrium with cystic atrophy, no hyperplasia or atypia seen.\par - Cervix showing chronic cervicitis with Nabothian cysts.\par - Benign ovary showing corpus albicans and few benign glandular inclusions, no neoplasm identified.\par - Benign fallopian tubes with few paratubal simple cysts.\par \par Note: There is no histologic evidence of metastatic carcinoma identified in this specimen. Clinical correlation is recommended. The patient's prior specimen report (02-CS-) was noticed.\par Verified by: Cristofer Fleming M.D.\par \par Last seen GYN ONC office 22 in which patient with c/or RLQ pain, painful intercourse, very sensitive atrophic skin at 6 O'Clock of her introitus and urinary incontinence- patient referred to UroGYN for further urinary incontinence w/u; recommended lubrication prior to intercourse; patient to f/u 3-4 months. \par \par Patient presenting today for 4 month follow-up.

## 2023-04-25 NOTE — ASSESSMENT
[FreeTextEntry1] : 47 year old patient of Dr. Little,  (spont abx1) with newly diagnosed Breast Cancer s/p risk reducing surgery, with TLH/BSO 2022. She appears to have recovered well. \par

## 2023-05-04 ENCOUNTER — APPOINTMENT (OUTPATIENT)
Dept: HEMATOLOGY ONCOLOGY | Facility: CLINIC | Age: 48
End: 2023-05-04
Payer: COMMERCIAL

## 2023-05-04 ENCOUNTER — OUTPATIENT (OUTPATIENT)
Dept: OUTPATIENT SERVICES | Facility: HOSPITAL | Age: 48
LOS: 1 days | End: 2023-05-04
Payer: COMMERCIAL

## 2023-05-04 ENCOUNTER — APPOINTMENT (OUTPATIENT)
Dept: HEMATOLOGY ONCOLOGY | Facility: CLINIC | Age: 48
End: 2023-05-04

## 2023-05-04 VITALS
HEART RATE: 66 BPM | SYSTOLIC BLOOD PRESSURE: 124 MMHG | RESPIRATION RATE: 18 BRPM | BODY MASS INDEX: 20.99 KG/M2 | HEIGHT: 65 IN | WEIGHT: 126 LBS | DIASTOLIC BLOOD PRESSURE: 86 MMHG | TEMPERATURE: 98.4 F

## 2023-05-04 DIAGNOSIS — K82.4 CHOLESTEROLOSIS OF GALLBLADDER: ICD-10-CM

## 2023-05-04 DIAGNOSIS — Z90.49 ACQUIRED ABSENCE OF OTHER SPECIFIED PARTS OF DIGESTIVE TRACT: Chronic | ICD-10-CM

## 2023-05-04 DIAGNOSIS — C50.919 MALIGNANT NEOPLASM OF UNSPECIFIED SITE OF UNSPECIFIED FEMALE BREAST: ICD-10-CM

## 2023-05-04 DIAGNOSIS — Z98.890 OTHER SPECIFIED POSTPROCEDURAL STATES: Chronic | ICD-10-CM

## 2023-05-04 DIAGNOSIS — Z90.13 ACQUIRED ABSENCE OF BILATERAL BREASTS AND NIPPLES: Chronic | ICD-10-CM

## 2023-05-04 DIAGNOSIS — M67.40 GANGLION, UNSPECIFIED SITE: Chronic | ICD-10-CM

## 2023-05-04 PROCEDURE — 99214 OFFICE O/P EST MOD 30 MIN: CPT

## 2023-05-04 NOTE — CONSULT LETTER
[Dear  ___] : Dear  [unfilled], [Consult Letter:] : I had the pleasure of evaluating your patient, [unfilled]. [Please see my note below.] : Please see my note below. [Consult Closing:] : Thank you very much for allowing me to participate in the care of this patient.  If you have any questions, please do not hesitate to contact me. [Sincerely,] : Sincerely, [DrMele  ___] : Dr. 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

## 2023-05-04 NOTE — HISTORY OF PRESENT ILLNESS
[de-identified] : This is a 45 premenopausal woman being seen for wt loss.\par Pt has Lost 15 lbs in the last 6 mths. \par She has a good appetite.\par C/o having drenching  night sweats more often.\par She is known to have enlarged  neck Lns, saw ENt however could not get FNA as the nodes were too small.\par Had recent change in bowel habits over the last month or so with constipation and diarrhea. She has a follow up with GI.\par Has heavy periods, has fibroids, will see GYN \par She has an enlarged LNs in the groin\par colonoscopy done in 12/2/19 showed 7mm ascending colon polyp.\par EGD non erosive gastritis\par \par She was diagnosed with pathologic stage IIB T2N1M0 HR+, HER2 negative IDC of right breast in 4/21.\par \par ONCOTYPE DX RS 32.\par RR at 7 yrs 25% with endocrine therapy\par  benefit from chemo 15%\par \par Genetic testing did not reveal any mutations. \par \par PT IS S/P ADJUVANT CHEMO WITH AC-T completed in 11/21\par S/P RT in 1/22\par S/P TLH/BSO in 1/22\par \par Started Anastrazole last week of Jan 2022 \par Bone density on 12/13/22 showed osteopenia of the AP spine with T score -1.3 [de-identified] : ONCOTYPE DX RS 32.\par RR at 9 yrs 25%.\par With chemo benefit from chemo 15% [de-identified] : 5/4/23\par  Patient here for follow up, feeling well.  She denies any new complaints.  Patient denies any new palpable chest wall  lumps or pain, denies skin changes, .  Patient denies cough, shortness of breath, denies fever, denies bone pain.\par goes for acupuncture and cupping.\par Planning on getting breast reconstructive surgery revision\par

## 2023-05-04 NOTE — REVIEW OF SYSTEMS
[Joint Stiffness] : joint stiffness [Insomnia] : insomnia [Negative] : Allergic/Immunologic [Night Sweats] : no night sweats [Fatigue] : no fatigue [Swollen Glands] : no swollen glands

## 2023-05-04 NOTE — PHYSICAL EXAM
[Fully active, able to carry on all pre-disease performance without restriction] : Status 0 - Fully active, able to carry on all pre-disease performance without restriction [Normal] : affect appropriate [de-identified] : Bilateral mastectomy with reconstruction; breast and chest wall exam is unrevealing

## 2023-05-04 NOTE — ASSESSMENT
[FreeTextEntry1] : Patient is a 46 year old premenopausal woman with pathologic stage IIB T2N1M0 HR+, HER2 negative IDC of right breast.\par \par ONCOTYPE DX RS 32.\par RR at 9 yrs 25% with endocrine therapy\par  benefit from chemo 15%\par \par Genetic testing did not reveal any mutations. \par \par PT IS S/P ADJUVANT CHEMO WITH AC-T completed in 11/21\par S/P RT in 1/22\par S/P TLH/BSO in 1/22\par \par Started Anastrazole last week of Jan 2022 for at least 5 years or longer.\par Bone density on 12/13/22 showed osteopenia of the AP spine with T score -1.3\par \par # Thyroid nodule\par # Gall bladder polyp\par \par RECOMMENDATIONS:\par Previous notes reviewed and all relevant radiological and laboratory results discussed with Dr Little and were communicated those to the patient.\par \par -- Continue  Arimidex 1 mg daily, eRx sent.\par The side effects from such treatment were discussed in detail including but not limited to hot flashes, weight gain, hair thinning, arthralgia, myalgia, bone loss, increased risk of osteoporotic fractures and thrombo-embolism.\par She will continue to take Calcium + VItamin D daily while on AI. \par -- Pt advised on healthy life style and exercise including yoga and to call us if her symptoms are not getting better.\par -- Continue to follow up with PCP, gyne, and GI as recommended.\par \par #Osteopenia\par -- Continue Vitamin D and Calcium supplement.\par -- Advised weight bearing exercises.\par -- Next bone density is due on 12/2023.\par \par #Gallbladder polyp\par -- Continue abdominal US as recommended by GI.\par -- Follow up with GI\par \par All her concerns were addressed during the visit.\par RTC in 6 months\par \par

## 2023-05-05 DIAGNOSIS — C50.411 MALIGNANT NEOPLASM OF UPPER-OUTER QUADRANT OF RIGHT FEMALE BREAST: ICD-10-CM

## 2023-05-05 DIAGNOSIS — Z79.811 LONG TERM (CURRENT) USE OF AROMATASE INHIBITORS: ICD-10-CM

## 2023-05-05 DIAGNOSIS — K82.4 CHOLESTEROLOSIS OF GALLBLADDER: ICD-10-CM

## 2023-05-05 DIAGNOSIS — C50.919 MALIGNANT NEOPLASM OF UNSPECIFIED SITE OF UNSPECIFIED FEMALE BREAST: ICD-10-CM

## 2023-06-02 ENCOUNTER — APPOINTMENT (OUTPATIENT)
Dept: PULMONOLOGY | Facility: CLINIC | Age: 48
End: 2023-06-02
Payer: COMMERCIAL

## 2023-06-02 VITALS
HEIGHT: 65 IN | RESPIRATION RATE: 14 BRPM | BODY MASS INDEX: 21.49 KG/M2 | SYSTOLIC BLOOD PRESSURE: 120 MMHG | WEIGHT: 129 LBS | HEART RATE: 89 BPM | DIASTOLIC BLOOD PRESSURE: 80 MMHG | OXYGEN SATURATION: 98 %

## 2023-06-02 DIAGNOSIS — R91.8 OTHER NONSPECIFIC ABNORMAL FINDING OF LUNG FIELD: ICD-10-CM

## 2023-06-02 DIAGNOSIS — J45.909 UNSPECIFIED ASTHMA, UNCOMPLICATED: ICD-10-CM

## 2023-06-02 PROCEDURE — 99213 OFFICE O/P EST LOW 20 MIN: CPT

## 2023-06-04 LAB
ALBUMIN SERPL ELPH-MCNC: 5.2 G/DL
ALP BLD-CCNC: 87 U/L
ALT SERPL-CCNC: 11 U/L
ANION GAP SERPL CALC-SCNC: 12 MMOL/L
AST SERPL-CCNC: 17 U/L
BILIRUB SERPL-MCNC: <0.2 MG/DL
BUN SERPL-MCNC: 26 MG/DL
CALCIUM SERPL-MCNC: 9.9 MG/DL
CHLORIDE SERPL-SCNC: 107 MMOL/L
CHOLEST SERPL-MCNC: 241 MG/DL
CO2 SERPL-SCNC: 24 MMOL/L
CREAT SERPL-MCNC: 0.9 MG/DL
EGFR: 78 ML/MIN/1.73M2
FERRITIN SERPL-MCNC: 35 NG/ML
GLUCOSE SERPL-MCNC: 95 MG/DL
HDLC SERPL-MCNC: 93 MG/DL
IRON SATN MFR SERPL: 10 %
IRON SERPL-MCNC: 44 UG/DL
LDLC SERPL CALC-MCNC: 136 MG/DL
MAGNESIUM SERPL-MCNC: 2.1 MG/DL
NONHDLC SERPL-MCNC: 148 MG/DL
POTASSIUM SERPL-SCNC: 4.5 MMOL/L
PROT SERPL-MCNC: 7.5 G/DL
SODIUM SERPL-SCNC: 143 MMOL/L
T4 FREE SERPL-MCNC: 1 NG/DL
TIBC SERPL-MCNC: 429 UG/DL
TRIGL SERPL-MCNC: 62 MG/DL
TSH SERPL-ACNC: 1.66 UIU/ML
UIBC SERPL-MCNC: 385 UG/DL

## 2023-06-08 ENCOUNTER — APPOINTMENT (OUTPATIENT)
Dept: PLASTIC SURGERY | Facility: AMBULATORY SURGERY CENTER | Age: 48
End: 2023-06-08

## 2023-06-15 ENCOUNTER — APPOINTMENT (OUTPATIENT)
Dept: PLASTIC SURGERY | Facility: CLINIC | Age: 48
End: 2023-06-15

## 2023-06-16 NOTE — ASSESSMENT
[FreeTextEntry1] : 46 year old patient of Dr. Little,  (spont abx1) with newly diagnosed Breast Cancer s/p risk reducing surgery, with TLH/BSO 2022.She was concerned about her umbilical incisions and presents for wound check.\par \par Surgical Pathology Report \par Specimen(s) Submitted\par Uterus, cervix, bilateral tubes and ovaries\par \par Final Diagnosis\par Uterus, cervix, bilateral tubes and ovaries; total laparoscopic\par hysterectomy and BSO:\par - Benign leiomyoma.\par - The myometrium also showing superficial foci of adenomyosis.\par \par - Benign endometrial polyp and inactive endometrium with cystic atrophy,\par no hyperplasia or atypia seen.\par - Cervix showing chronic cervicitis with Nabothian cysts.\par - Benign ovary showing corpus albicans and few benign glandular\par inclusions, no neoplasm identified.\par - Benign fallopian tubes with few paratubal simple cysts.\par \par Note: There is no histologic evidence of metastatic carcinoma identified\par in this specimen. Clinical correlation is recommended. The patient's\par prior specimen report (08-CJ-) was noticed.\par Verified by: Cristofer Fleming M.D.\par (Electronic Signature)\par Reported on: 22 18:14 EST, [facility],\par [address] [place] [address]\par Phone: [phone] Fax: [phone]. \par \par  [phone]

## 2023-06-18 ENCOUNTER — RESULT REVIEW (OUTPATIENT)
Age: 48
End: 2023-06-18

## 2023-06-18 ENCOUNTER — OUTPATIENT (OUTPATIENT)
Dept: OUTPATIENT SERVICES | Facility: HOSPITAL | Age: 48
LOS: 1 days | End: 2023-06-18
Payer: COMMERCIAL

## 2023-06-18 DIAGNOSIS — Z90.49 ACQUIRED ABSENCE OF OTHER SPECIFIED PARTS OF DIGESTIVE TRACT: Chronic | ICD-10-CM

## 2023-06-18 DIAGNOSIS — Z98.890 OTHER SPECIFIED POSTPROCEDURAL STATES: Chronic | ICD-10-CM

## 2023-06-18 DIAGNOSIS — M67.40 GANGLION, UNSPECIFIED SITE: Chronic | ICD-10-CM

## 2023-06-18 DIAGNOSIS — Z90.13 ACQUIRED ABSENCE OF BILATERAL BREASTS AND NIPPLES: Chronic | ICD-10-CM

## 2023-06-18 DIAGNOSIS — Z00.8 ENCOUNTER FOR OTHER GENERAL EXAMINATION: ICD-10-CM

## 2023-06-18 DIAGNOSIS — R91.1 SOLITARY PULMONARY NODULE: ICD-10-CM

## 2023-06-18 PROCEDURE — 71250 CT THORAX DX C-: CPT | Mod: 26

## 2023-06-18 PROCEDURE — 71250 CT THORAX DX C-: CPT

## 2023-06-19 DIAGNOSIS — R91.1 SOLITARY PULMONARY NODULE: ICD-10-CM

## 2023-09-08 ENCOUNTER — RESULT REVIEW (OUTPATIENT)
Age: 48
End: 2023-09-08

## 2023-09-08 ENCOUNTER — OUTPATIENT (OUTPATIENT)
Dept: OUTPATIENT SERVICES | Facility: HOSPITAL | Age: 48
LOS: 1 days | End: 2023-09-08
Payer: COMMERCIAL

## 2023-09-08 DIAGNOSIS — Z90.49 ACQUIRED ABSENCE OF OTHER SPECIFIED PARTS OF DIGESTIVE TRACT: Chronic | ICD-10-CM

## 2023-09-08 DIAGNOSIS — M54.59 OTHER LOW BACK PAIN: ICD-10-CM

## 2023-09-08 DIAGNOSIS — Z90.13 ACQUIRED ABSENCE OF BILATERAL BREASTS AND NIPPLES: Chronic | ICD-10-CM

## 2023-09-08 DIAGNOSIS — Z98.890 OTHER SPECIFIED POSTPROCEDURAL STATES: Chronic | ICD-10-CM

## 2023-09-08 DIAGNOSIS — M67.40 GANGLION, UNSPECIFIED SITE: Chronic | ICD-10-CM

## 2023-09-08 PROCEDURE — 72110 X-RAY EXAM L-2 SPINE 4/>VWS: CPT

## 2023-09-08 PROCEDURE — 72110 X-RAY EXAM L-2 SPINE 4/>VWS: CPT | Mod: 26

## 2023-09-09 DIAGNOSIS — M54.59 OTHER LOW BACK PAIN: ICD-10-CM

## 2023-09-17 ENCOUNTER — OUTPATIENT (OUTPATIENT)
Dept: OUTPATIENT SERVICES | Facility: HOSPITAL | Age: 48
LOS: 1 days | End: 2023-09-17
Payer: COMMERCIAL

## 2023-09-17 DIAGNOSIS — Z90.49 ACQUIRED ABSENCE OF OTHER SPECIFIED PARTS OF DIGESTIVE TRACT: Chronic | ICD-10-CM

## 2023-09-17 DIAGNOSIS — Z98.890 OTHER SPECIFIED POSTPROCEDURAL STATES: Chronic | ICD-10-CM

## 2023-09-17 DIAGNOSIS — Z00.8 ENCOUNTER FOR OTHER GENERAL EXAMINATION: ICD-10-CM

## 2023-09-17 DIAGNOSIS — R10.9 UNSPECIFIED ABDOMINAL PAIN: ICD-10-CM

## 2023-09-17 DIAGNOSIS — Z90.13 ACQUIRED ABSENCE OF BILATERAL BREASTS AND NIPPLES: Chronic | ICD-10-CM

## 2023-09-17 DIAGNOSIS — M67.40 GANGLION, UNSPECIFIED SITE: Chronic | ICD-10-CM

## 2023-09-17 PROCEDURE — 76700 US EXAM ABDOM COMPLETE: CPT

## 2023-09-17 PROCEDURE — 76700 US EXAM ABDOM COMPLETE: CPT | Mod: 26

## 2023-09-18 DIAGNOSIS — R10.9 UNSPECIFIED ABDOMINAL PAIN: ICD-10-CM

## 2023-09-19 ENCOUNTER — APPOINTMENT (OUTPATIENT)
Dept: GASTROENTEROLOGY | Facility: CLINIC | Age: 48
End: 2023-09-19

## 2023-09-23 ENCOUNTER — OUTPATIENT (OUTPATIENT)
Dept: OUTPATIENT SERVICES | Facility: HOSPITAL | Age: 48
LOS: 1 days | End: 2023-09-23
Payer: COMMERCIAL

## 2023-09-23 DIAGNOSIS — M67.40 GANGLION, UNSPECIFIED SITE: Chronic | ICD-10-CM

## 2023-09-23 DIAGNOSIS — Z90.49 ACQUIRED ABSENCE OF OTHER SPECIFIED PARTS OF DIGESTIVE TRACT: Chronic | ICD-10-CM

## 2023-09-23 DIAGNOSIS — Z90.13 ACQUIRED ABSENCE OF BILATERAL BREASTS AND NIPPLES: Chronic | ICD-10-CM

## 2023-09-23 DIAGNOSIS — Z00.8 ENCOUNTER FOR OTHER GENERAL EXAMINATION: ICD-10-CM

## 2023-09-23 DIAGNOSIS — Z98.890 OTHER SPECIFIED POSTPROCEDURAL STATES: Chronic | ICD-10-CM

## 2023-09-23 DIAGNOSIS — M25.50 PAIN IN UNSPECIFIED JOINT: ICD-10-CM

## 2023-09-23 PROCEDURE — 72148 MRI LUMBAR SPINE W/O DYE: CPT

## 2023-09-23 PROCEDURE — 72148 MRI LUMBAR SPINE W/O DYE: CPT | Mod: 26

## 2023-09-24 ENCOUNTER — RX RENEWAL (OUTPATIENT)
Age: 48
End: 2023-09-24

## 2023-09-24 DIAGNOSIS — M25.50 PAIN IN UNSPECIFIED JOINT: ICD-10-CM

## 2023-09-24 RX ORDER — AMLODIPINE BESYLATE 5 MG/1
5 TABLET ORAL DAILY
Qty: 90 | Refills: 3 | Status: ACTIVE | COMMUNITY
Start: 2021-12-09 | End: 1900-01-01

## 2023-10-15 ENCOUNTER — RESULT REVIEW (OUTPATIENT)
Age: 48
End: 2023-10-15

## 2023-10-15 ENCOUNTER — OUTPATIENT (OUTPATIENT)
Dept: OUTPATIENT SERVICES | Facility: HOSPITAL | Age: 48
LOS: 1 days | End: 2023-10-15
Payer: COMMERCIAL

## 2023-10-15 DIAGNOSIS — Z00.8 ENCOUNTER FOR OTHER GENERAL EXAMINATION: ICD-10-CM

## 2023-10-15 DIAGNOSIS — Z98.890 OTHER SPECIFIED POSTPROCEDURAL STATES: Chronic | ICD-10-CM

## 2023-10-15 DIAGNOSIS — M67.40 GANGLION, UNSPECIFIED SITE: Chronic | ICD-10-CM

## 2023-10-15 DIAGNOSIS — E01.0 IODINE-DEFICIENCY RELATED DIFFUSE (ENDEMIC) GOITER: ICD-10-CM

## 2023-10-15 DIAGNOSIS — E04.1 NONTOXIC SINGLE THYROID NODULE: ICD-10-CM

## 2023-10-15 DIAGNOSIS — Z90.49 ACQUIRED ABSENCE OF OTHER SPECIFIED PARTS OF DIGESTIVE TRACT: Chronic | ICD-10-CM

## 2023-10-15 DIAGNOSIS — Z90.13 ACQUIRED ABSENCE OF BILATERAL BREASTS AND NIPPLES: Chronic | ICD-10-CM

## 2023-10-15 PROCEDURE — 76536 US EXAM OF HEAD AND NECK: CPT | Mod: 26

## 2023-10-15 PROCEDURE — 76536 US EXAM OF HEAD AND NECK: CPT

## 2023-10-16 DIAGNOSIS — E04.1 NONTOXIC SINGLE THYROID NODULE: ICD-10-CM

## 2023-10-16 DIAGNOSIS — E01.0 IODINE-DEFICIENCY RELATED DIFFUSE (ENDEMIC) GOITER: ICD-10-CM

## 2023-10-16 NOTE — H&P PST ADULT - NS PRO ABUSE SCREEN SUSPICION NEGLECT YN
Called patient to let her know her prescriptions were signed and sent to her perferred pharmacy. Patient stated \" I just received a call from the pharmacy letting me know it would be ready today thank you\".   no

## 2023-10-25 ENCOUNTER — NON-APPOINTMENT (OUTPATIENT)
Age: 48
End: 2023-10-25

## 2023-10-26 ENCOUNTER — APPOINTMENT (OUTPATIENT)
Dept: SURGERY | Facility: CLINIC | Age: 48
End: 2023-10-26

## 2023-11-01 ENCOUNTER — APPOINTMENT (OUTPATIENT)
Dept: OTOLARYNGOLOGY | Facility: CLINIC | Age: 48
End: 2023-11-01
Payer: COMMERCIAL

## 2023-11-01 PROCEDURE — 10021 FNA BX W/O IMG GDN 1ST LES: CPT

## 2023-11-01 PROCEDURE — 99202 OFFICE O/P NEW SF 15 MIN: CPT | Mod: 25

## 2023-11-07 ENCOUNTER — APPOINTMENT (OUTPATIENT)
Dept: CARDIOLOGY | Facility: CLINIC | Age: 48
End: 2023-11-07

## 2023-11-13 ENCOUNTER — APPOINTMENT (OUTPATIENT)
Dept: ORTHOPEDIC SURGERY | Facility: CLINIC | Age: 48
End: 2023-11-13
Payer: COMMERCIAL

## 2023-11-13 ENCOUNTER — RESULT CHARGE (OUTPATIENT)
Age: 48
End: 2023-11-13

## 2023-11-13 VITALS — WEIGHT: 132 LBS | BODY MASS INDEX: 21.21 KG/M2 | HEIGHT: 66 IN

## 2023-11-13 DIAGNOSIS — S80.01XA CONTUSION OF RIGHT KNEE, INITIAL ENCOUNTER: ICD-10-CM

## 2023-11-13 DIAGNOSIS — S93.492A SPRAIN OF OTHER LIGAMENT OF LEFT ANKLE, INITIAL ENCOUNTER: ICD-10-CM

## 2023-11-13 PROCEDURE — 73610 X-RAY EXAM OF ANKLE: CPT | Mod: LT

## 2023-11-13 PROCEDURE — 73562 X-RAY EXAM OF KNEE 3: CPT | Mod: RT

## 2023-11-13 PROCEDURE — 99203 OFFICE O/P NEW LOW 30 MIN: CPT

## 2023-11-14 ENCOUNTER — APPOINTMENT (OUTPATIENT)
Dept: HEMATOLOGY ONCOLOGY | Facility: CLINIC | Age: 48
End: 2023-11-14
Payer: COMMERCIAL

## 2023-11-14 ENCOUNTER — OUTPATIENT (OUTPATIENT)
Dept: OUTPATIENT SERVICES | Facility: HOSPITAL | Age: 48
LOS: 1 days | End: 2023-11-14
Payer: COMMERCIAL

## 2023-11-14 ENCOUNTER — APPOINTMENT (OUTPATIENT)
Dept: CARDIOLOGY | Facility: CLINIC | Age: 48
End: 2023-11-14
Payer: COMMERCIAL

## 2023-11-14 ENCOUNTER — APPOINTMENT (OUTPATIENT)
Dept: BREAST CENTER | Facility: CLINIC | Age: 48
End: 2023-11-14
Payer: COMMERCIAL

## 2023-11-14 VITALS
DIASTOLIC BLOOD PRESSURE: 80 MMHG | HEART RATE: 67 BPM | HEIGHT: 66 IN | SYSTOLIC BLOOD PRESSURE: 130 MMHG | WEIGHT: 133 LBS | BODY MASS INDEX: 21.38 KG/M2

## 2023-11-14 VITALS
WEIGHT: 133 LBS | HEIGHT: 66 IN | DIASTOLIC BLOOD PRESSURE: 81 MMHG | HEART RATE: 69 BPM | BODY MASS INDEX: 21.38 KG/M2 | TEMPERATURE: 98.3 F | SYSTOLIC BLOOD PRESSURE: 144 MMHG

## 2023-11-14 DIAGNOSIS — K82.4 CHOLESTEROLOSIS OF GALLBLADDER: ICD-10-CM

## 2023-11-14 DIAGNOSIS — M67.40 GANGLION, UNSPECIFIED SITE: Chronic | ICD-10-CM

## 2023-11-14 DIAGNOSIS — Z90.13 ACQUIRED ABSENCE OF BILATERAL BREASTS AND NIPPLES: ICD-10-CM

## 2023-11-14 DIAGNOSIS — Z90.13 ACQUIRED ABSENCE OF BILATERAL BREASTS AND NIPPLES: Chronic | ICD-10-CM

## 2023-11-14 DIAGNOSIS — C50.919 MALIGNANT NEOPLASM OF UNSPECIFIED SITE OF UNSPECIFIED FEMALE BREAST: ICD-10-CM

## 2023-11-14 DIAGNOSIS — R19.7 DIARRHEA, UNSPECIFIED: ICD-10-CM

## 2023-11-14 DIAGNOSIS — Z12.11 ENCOUNTER FOR SCREENING FOR MALIGNANT NEOPLASM OF COLON: ICD-10-CM

## 2023-11-14 DIAGNOSIS — Z85.3 PERSONAL HISTORY OF MALIGNANT NEOPLASM OF BREAST: ICD-10-CM

## 2023-11-14 DIAGNOSIS — Z98.890 OTHER SPECIFIED POSTPROCEDURAL STATES: Chronic | ICD-10-CM

## 2023-11-14 DIAGNOSIS — Z79.811 LONG TERM (CURRENT) USE OF AROMATASE INHIBITORS: ICD-10-CM

## 2023-11-14 DIAGNOSIS — Z92.3 PERSONAL HISTORY OF IRRADIATION: ICD-10-CM

## 2023-11-14 DIAGNOSIS — C50.411 MALIGNANT NEOPLASM OF UPPER-OUTER QUADRANT OF RIGHT FEMALE BREAST: ICD-10-CM

## 2023-11-14 DIAGNOSIS — K21.9 GASTRO-ESOPHAGEAL REFLUX DISEASE W/OUT ESOPHAGITIS: ICD-10-CM

## 2023-11-14 DIAGNOSIS — R42 DIZZINESS AND GIDDINESS: ICD-10-CM

## 2023-11-14 DIAGNOSIS — Z90.49 ACQUIRED ABSENCE OF OTHER SPECIFIED PARTS OF DIGESTIVE TRACT: Chronic | ICD-10-CM

## 2023-11-14 PROCEDURE — 99214 OFFICE O/P EST MOD 30 MIN: CPT

## 2023-11-14 PROCEDURE — 93000 ELECTROCARDIOGRAM COMPLETE: CPT

## 2023-11-14 PROCEDURE — 99214 OFFICE O/P EST MOD 30 MIN: CPT | Mod: 25

## 2023-12-15 ENCOUNTER — APPOINTMENT (OUTPATIENT)
Dept: CARDIOLOGY | Facility: CLINIC | Age: 48
End: 2023-12-15
Payer: COMMERCIAL

## 2023-12-15 ENCOUNTER — OUTPATIENT (OUTPATIENT)
Dept: OUTPATIENT SERVICES | Facility: HOSPITAL | Age: 48
LOS: 1 days | End: 2023-12-15
Payer: COMMERCIAL

## 2023-12-15 ENCOUNTER — RESULT REVIEW (OUTPATIENT)
Age: 48
End: 2023-12-15

## 2023-12-15 DIAGNOSIS — M67.40 GANGLION, UNSPECIFIED SITE: Chronic | ICD-10-CM

## 2023-12-15 DIAGNOSIS — Z13.820 ENCOUNTER FOR SCREENING FOR OSTEOPOROSIS: ICD-10-CM

## 2023-12-15 DIAGNOSIS — Z90.13 ACQUIRED ABSENCE OF BILATERAL BREASTS AND NIPPLES: Chronic | ICD-10-CM

## 2023-12-15 DIAGNOSIS — Z98.890 OTHER SPECIFIED POSTPROCEDURAL STATES: Chronic | ICD-10-CM

## 2023-12-15 DIAGNOSIS — Z90.49 ACQUIRED ABSENCE OF OTHER SPECIFIED PARTS OF DIGESTIVE TRACT: Chronic | ICD-10-CM

## 2023-12-15 DIAGNOSIS — Z00.8 ENCOUNTER FOR OTHER GENERAL EXAMINATION: ICD-10-CM

## 2023-12-15 DIAGNOSIS — R94.31 ABNORMAL ELECTROCARDIOGRAM [ECG] [EKG]: ICD-10-CM

## 2023-12-15 DIAGNOSIS — I10 ESSENTIAL (PRIMARY) HYPERTENSION: ICD-10-CM

## 2023-12-15 PROCEDURE — 93306 TTE W/DOPPLER COMPLETE: CPT

## 2023-12-15 PROCEDURE — 77080 DXA BONE DENSITY AXIAL: CPT

## 2023-12-16 DIAGNOSIS — Z13.820 ENCOUNTER FOR SCREENING FOR OSTEOPOROSIS: ICD-10-CM

## 2023-12-21 ENCOUNTER — APPOINTMENT (OUTPATIENT)
Dept: ORTHOPEDIC SURGERY | Facility: CLINIC | Age: 48
End: 2023-12-21
Payer: COMMERCIAL

## 2023-12-21 DIAGNOSIS — S93.492D SPRAIN OF OTHER LIGAMENT OF LEFT ANKLE, SUBSEQUENT ENCOUNTER: ICD-10-CM

## 2023-12-21 DIAGNOSIS — S80.01XD CONTUSION OF RIGHT KNEE, SUBSEQUENT ENCOUNTER: ICD-10-CM

## 2023-12-21 PROCEDURE — 99213 OFFICE O/P EST LOW 20 MIN: CPT

## 2023-12-21 NOTE — IMAGING
[de-identified] : On examination of the right knee resolving anterior knee abrasion, otherwise no swelling.  Full range of motion no pain to palpation.  On examination of the left ankle mild lateral ankle swelling, skin is intact.  Mild tenderness over the ATFL, ATFL and CFL.  No tenderness over the malleoli.  No tenderness over the foot.  Discomfort through inversion and eversion.

## 2023-12-21 NOTE — ASSESSMENT
[FreeTextEntry1] : Resolved knee contusion, improving lateral ankle sprain.  We did discuss that ankle sprains can take several months to resolve.  Caution with exercise in the gym.  We discussed therapy, although she does exercise on her own.  Heat is appropriate, anti-inflammatory as needed.  Follow-up in 6 weeks for repeat evaluation if the pain worsens or continues.

## 2023-12-21 NOTE — HISTORY OF PRESENT ILLNESS
[de-identified] : 48-year-old female comes in today for a repeat evaluation subsequent encounter of her right knee and left ankle injury that occurred on November 11.  She reports an improvement in her knee pain, still experience leg discomfort in the ankle with swelling.  She enjoys yoga.  She was using the [employer]

## 2024-01-20 NOTE — H&P PST ADULT - NS ABD PE RECTAL EXAM
[email or web address]
not examined

## 2024-01-26 ENCOUNTER — APPOINTMENT (OUTPATIENT)
Dept: PLASTIC SURGERY | Facility: CLINIC | Age: 49
End: 2024-01-26

## 2024-02-01 ENCOUNTER — APPOINTMENT (OUTPATIENT)
Dept: ORTHOPEDIC SURGERY | Facility: CLINIC | Age: 49
End: 2024-02-01

## 2024-02-27 ENCOUNTER — TRANSCRIPTION ENCOUNTER (OUTPATIENT)
Age: 49
End: 2024-02-27

## 2024-02-28 ENCOUNTER — TRANSCRIPTION ENCOUNTER (OUTPATIENT)
Age: 49
End: 2024-02-28

## 2024-03-15 NOTE — ASSESSMENT
[FreeTextEntry1] : In summary this is a 45 year old premenopausal woman with pathologic stage IIB T2N1M0 HR+, HER2 negative IDC of right breast.\par \par # Thyroid nodule: will be assessed with USG at a later date\par \par # Gall bladder polyp : GI eval\par \par RECOMMENDATIONS\par \par  I had a detailed discussion with the patient and her friend. I reviewed the radiological findings, pathology and staging with her..\par I reviewed the natural history of high grade HR+, HER2 neg breast cancer and how it differs from other types of breast cancer. I discussed that LN positive breast cancers tend to have a higher risk of systemic recurrence.\par therefore adjuvant chemotherapy and endocrine therapy is  usually recommended especially in premenopausal women.\par \par She is an appropriate candidate for adjuvant chemotherapy. We discussed the goal of such treatment is to improve risk of recurrence and overall survival.\par Multiple data sets have established the superiority of adjuvant anthracycline based chemotherapy in LN positive breast cancer.\par therefore she was recommended AC followed by Taxol, provided her cardiac function is adequate. \par ECHO has been reviewed. She has nl EF\par \par Adriamycin and cytoxan will be administered every 2 weeks X 4 with Neulasta support followed by taxol weekly x 12 \par I discussed side effects which include but are not limited to nausea, vomiting, hair loss, bone marrow suppression, cytopenia, increased risk of infection, sepsis, diarrhea, fatigue, allergic reaction and peripheral neuropathy. Other side effects such as cardiac toxicity,bone marrow injury leading to MDS/AML, were also discussed, however these risks are very small. Overall the risk benefit ratio favors treatment with chemotherapy \par \par \par  It would also be preferable that she undergo a Port_a_Cath placement for administration of chemotherapy.\par \par We also discussed the role for adjuvant RT given 3 Positive LNs, she will be referred to Rad/Onc.\par \par We also discussed need for endocrine therapy with OFS and AI.\par I will start her on Zoladex right away and add an AI after chemo and RT has been completed.\par Side effects of OFS were discussed including hot flashes, amenorrhea, bone loss, wt gain etc.\par \par \par \par We will request Oncotype Dx testing  although the RxPonder trial showed that in  premenopausal women with even RS =25 and one to three involved lymph nodes, the use of adjuvant chemotherapy improved outcomes. The five-year invasive DFS, with and without chemotherapy, was 94 versus 89 percent respectively.  \par \par \par Genetic testing did not reveal any mutations.\par \par All of her questions and concerns were addressed.\par \par Due to COVID 19, pre-visit patient instructions were explained to the patient and their symptoms were checked upon arrival. \par Masks were used by the health care providers and staff and the examination room was cleaned before and after the patient visit was completed.\par Pt was encouraged to get Covid vaccine.\par  no nausea/no vomiting/no diarrhea/no constipation/no change in bowel habits/no melena

## 2024-04-12 NOTE — HISTORY OF PRESENT ILLNESS
[FreeTextEntry1] : 47 year old patient of Dr. Little,  (spont abx1) with newly diagnosed Breast Cancer s/p risk reducing surgery, with TLH/BSO 2022. She appears to have recovered well.  Final pathology (22) Final Diagnosis: Uterus, cervix, bilateral tubes and ovaries; total laparoscopic hysterectomy and BSO: - Benign leiomyoma. - The myometrium also showing superficial foci of adenomyosis. - Benign endometrial polyp and inactive endometrium with cystic atrophy, no hyperplasia or atypia seen. - Cervix showing chronic cervicitis with Nabothian cysts. - Benign ovary showing corpus albicans and few benign glandular inclusions, no neoplasm identified. - Benign fallopian tubes with few paratubal simple cysts. Note: There is no histologic evidence of metastatic carcinoma identified in this specimen. Clinical correlation is recommended. The patient's prior specimen report (33-UE-) was noticed.  Last seen 23, estrogen vaginal lubrication discussed.  Patient presents today for annual follow up.

## 2024-04-19 ENCOUNTER — APPOINTMENT (OUTPATIENT)
Dept: GYNECOLOGIC ONCOLOGY | Facility: CLINIC | Age: 49
End: 2024-04-19
Payer: COMMERCIAL

## 2024-04-19 VITALS
HEART RATE: 69 BPM | DIASTOLIC BLOOD PRESSURE: 86 MMHG | SYSTOLIC BLOOD PRESSURE: 129 MMHG | HEIGHT: 65 IN | BODY MASS INDEX: 22.16 KG/M2 | WEIGHT: 133 LBS

## 2024-04-19 DIAGNOSIS — C50.212 MALIGNANT NEOPLASM OF UPPER-INNER QUADRANT OF RIGHT FEMALE BREAST: ICD-10-CM

## 2024-04-19 DIAGNOSIS — Z17.0 MALIGNANT NEOPLASM OF UPPER-INNER QUADRANT OF RIGHT FEMALE BREAST: ICD-10-CM

## 2024-04-19 DIAGNOSIS — Z90.722 ACQUIRED ABSENCE OF BOTH CERVIX AND UTERUS: ICD-10-CM

## 2024-04-19 DIAGNOSIS — N95.2 POSTMENOPAUSAL ATROPHIC VAGINITIS: ICD-10-CM

## 2024-04-19 DIAGNOSIS — Z90.79 ACQUIRED ABSENCE OF BOTH CERVIX AND UTERUS: ICD-10-CM

## 2024-04-19 DIAGNOSIS — Z98.890 OTHER SPECIFIED POSTPROCEDURAL STATES: ICD-10-CM

## 2024-04-19 DIAGNOSIS — Z90.710 ACQUIRED ABSENCE OF BOTH CERVIX AND UTERUS: ICD-10-CM

## 2024-04-19 DIAGNOSIS — C50.211 MALIGNANT NEOPLASM OF UPPER-INNER QUADRANT OF RIGHT FEMALE BREAST: ICD-10-CM

## 2024-04-19 PROCEDURE — 99213 OFFICE O/P EST LOW 20 MIN: CPT

## 2024-05-01 ENCOUNTER — OUTPATIENT (OUTPATIENT)
Dept: OUTPATIENT SERVICES | Facility: HOSPITAL | Age: 49
LOS: 1 days | End: 2024-05-01
Payer: COMMERCIAL

## 2024-05-01 ENCOUNTER — APPOINTMENT (OUTPATIENT)
Dept: RADIATION ONCOLOGY | Facility: HOSPITAL | Age: 49
End: 2024-05-01
Payer: COMMERCIAL

## 2024-05-01 VITALS
WEIGHT: 136.25 LBS | RESPIRATION RATE: 16 BRPM | SYSTOLIC BLOOD PRESSURE: 128 MMHG | HEART RATE: 84 BPM | DIASTOLIC BLOOD PRESSURE: 90 MMHG | OXYGEN SATURATION: 96 % | BODY MASS INDEX: 22.67 KG/M2 | TEMPERATURE: 98.7 F

## 2024-05-01 DIAGNOSIS — Z90.13 ACQUIRED ABSENCE OF BILATERAL BREASTS AND NIPPLES: Chronic | ICD-10-CM

## 2024-05-01 DIAGNOSIS — C50.411 MALIGNANT NEOPLASM OF UPPER-OUTER QUADRANT OF RIGHT FEMALE BREAST: ICD-10-CM

## 2024-05-01 DIAGNOSIS — Z90.49 ACQUIRED ABSENCE OF OTHER SPECIFIED PARTS OF DIGESTIVE TRACT: Chronic | ICD-10-CM

## 2024-05-01 DIAGNOSIS — Z98.890 OTHER SPECIFIED POSTPROCEDURAL STATES: Chronic | ICD-10-CM

## 2024-05-01 DIAGNOSIS — M67.40 GANGLION, UNSPECIFIED SITE: Chronic | ICD-10-CM

## 2024-05-01 DIAGNOSIS — Z17.0 MALIGNANT NEOPLASM OF UPPER-OUTER QUADRANT OF RIGHT FEMALE BREAST: ICD-10-CM

## 2024-05-01 PROCEDURE — 99213 OFFICE O/P EST LOW 20 MIN: CPT

## 2024-05-01 RX ORDER — CEFADROXIL 500 MG/1
500 CAPSULE ORAL
Qty: 2 | Refills: 2 | Status: DISCONTINUED | COMMUNITY
Start: 2023-02-13 | End: 2024-05-01

## 2024-05-01 RX ORDER — BIOTIN 10 MG
TABLET ORAL
Refills: 0 | Status: DISCONTINUED | COMMUNITY
End: 2024-05-01

## 2024-05-01 RX ORDER — MONTELUKAST 10 MG/1
10 TABLET, FILM COATED ORAL
Qty: 90 | Refills: 2 | Status: DISCONTINUED | COMMUNITY
Start: 2022-11-04 | End: 2024-05-01

## 2024-05-03 DIAGNOSIS — C50.411 MALIGNANT NEOPLASM OF UPPER-OUTER QUADRANT OF RIGHT FEMALE BREAST: ICD-10-CM

## 2024-05-03 NOTE — HISTORY OF PRESENT ILLNESS
[FreeTextEntry1] : LADONNA GUZMAN returns to clinic in follow up visit.  As you know, LADONNA GUZMAN is a 48 year old female with invasive ductal carcinoma of the right breast, upper outer quadrant, G3, ER /KS positive / HER 2 negative, [id number], Stage IIA.  She is s/p bilateral mastectomies and TE reconstruction, right ALND and chemotherapy.  The patient was treated to the right reconstructed breast and regional nodes using a 3D mono-isocentric technique.  The patient tolerated treatments well. The patient had the expected side effects of skin erythema and fatigue.  The patient received 5000 cGy/25Fx to the right reconstructed breast and right Supraclavicular nodes from 11/29/2021  through 1/4/2022.  We last saw her in April 2023. On 9/6/2022, she had  tissue expander removal and exchange for permanent silicone implants.  In the interim, the patient states she feels well.  She continues working full time as NP. She denies any pain or fatigue. She follows up with  and breast surgery team. She takes anastrazole daily. She has some hot flashes, mild joint pain and has gained some weight since being on anastrazole but is not sure if it is from medication or because she also went into menopause at same time. She has gotten back to gym to keep active and help with joint pains.   Upcoming appointments: Dr. Little 5/7/2024 Breast surgery team 11/13/2024

## 2024-05-03 NOTE — LETTER CLOSING
[Consult Closing:] : Thank you for allowing me to participate in the care of this patient.  If you have any questions, please do not hesitate to contact me. [Sincerely yours,] : Sincerely yours, [FreeTextEntry3] : Elin Shah M.D.   Electronically proofread and signed by:  Elin Shah MD Attending, Department of Radiation Medicine [facility]

## 2024-05-03 NOTE — REVIEW OF SYSTEMS
[Joint Pain] : joint pain [Negative] : Allergic/Immunologic [Fatigue] : no fatigue [Suicidal] : not suicidal [Anxiety] : no anxiety [Depression] : no depression [FreeTextEntry2] : hot flashes [de-identified] : some trouble sleeping

## 2024-05-03 NOTE — PHYSICAL EXAM
[General Appearance - Well Developed] : well developed [General Appearance - Alert] : alert [General Appearance - In No Acute Distress] : in no acute distress [Heart Rate And Rhythm] : heart rate and rhythm were normal [Heart Sounds] : normal S1 and S2 [Breast Palpation Mass] : no palpable masses [Cervical Lymph Nodes Enlarged Posterior Bilaterally] : posterior cervical [Cervical Lymph Nodes Enlarged Anterior Bilaterally] : anterior cervical [Supraclavicular Lymph Nodes Enlarged Bilaterally] : supraclavicular [Axillary Lymph Nodes Enlarged Bilaterally] : axillary [Oriented To Time, Place, And Person] : oriented to person, place, and time [Sclera] : the sclera and conjunctiva were normal [Nondistended] : nondistended [Normal] : no focal deficits [de-identified] : She is examined in both the upright and supine positions.  Bilateral implants are symmetric.  No palpable mass in either recon breast.

## 2024-05-03 NOTE — DISEASE MANAGEMENT
[Pathological] : TNM Stage: p [IIA] : IIA [FreeTextEntry4] : invasive ductal carcinoma of the right breast, upper outer quadrant, G3, ER /NM positive / HER 2 negative  [TTNM] : 2 [NTNM] : 1a [MTNM] : 0

## 2024-05-07 ENCOUNTER — APPOINTMENT (OUTPATIENT)
Age: 49
End: 2024-05-07
Payer: COMMERCIAL

## 2024-05-07 ENCOUNTER — APPOINTMENT (OUTPATIENT)
Dept: HEMATOLOGY ONCOLOGY | Facility: CLINIC | Age: 49
End: 2024-05-07

## 2024-05-07 VITALS
WEIGHT: 133 LBS | DIASTOLIC BLOOD PRESSURE: 80 MMHG | HEART RATE: 94 BPM | SYSTOLIC BLOOD PRESSURE: 128 MMHG | HEIGHT: 65 IN | BODY MASS INDEX: 22.16 KG/M2 | TEMPERATURE: 97.6 F

## 2024-05-07 PROCEDURE — G2211 COMPLEX E/M VISIT ADD ON: CPT | Mod: NC,1L

## 2024-05-07 PROCEDURE — 99214 OFFICE O/P EST MOD 30 MIN: CPT

## 2024-05-07 RX ORDER — ANASTROZOLE TABLETS 1 MG/1
1 TABLET ORAL
Qty: 90 | Refills: 1 | Status: ACTIVE | COMMUNITY
Start: 2021-12-02 | End: 1900-01-01

## 2024-05-13 NOTE — CONSULT LETTER
[Dear  ___] : Dear  [unfilled], [Consult Letter:] : I had the pleasure of evaluating your patient, [unfilled]. [Please see my note below.] : Please see my note below. [Consult Closing:] : Thank you very much for allowing me to participate in the care of this patient.  If you have any questions, please do not hesitate to contact me. [Sincerely,] : Sincerely, [DrMele  ___] : Dr. KIM [FreeTextEntry3] : Wolfgang Little MD\par  Professor [employer]\par  [place]\par  Attending Physician\par  [facility] [place]\par  [phone]\par  \par

## 2024-05-13 NOTE — ASSESSMENT
[FreeTextEntry1] : Patient is a 46 year old premenopausal woman with pathologic stage IIB T2N1M0 HR+, HER2 negative IDC of right breast.  ONCOTYPE DX RS 32.  RR at 7 yrs 25% with endocrine therapy   benefit from chemo 15%  Genetic testing did not reveal any mutations.    PT IS S/P ADJUVANT CHEMO WITH AC-T completed in 11/21  S/P RT in 1/22  S/P TLH/BSO in 1/22    Started Anastrazole last week of Jan 2022 for at least 5 years or longer.  Bone density on 12/13/22 showed osteopenia of the AP spine with T score -1.3    # Thyroid nodule  # Gall bladder polyp    RECOMMENDATIONS:  Previous notes reviewed and all relevant radiological and laboratory results discussed with  and were communicated those to the patient.    -- Continue Arimidex 1 mg daily, eRx sent.  The side effects from such treatment were discussed in detail including but not limited to hot flashes, weight gain, hair thinning, arthralgia, myalgia, bone loss, increased risk of osteoporotic fractures and thrombo-embolism.  She will continue to take Calcium + VItamin D daily while on AI.  -- Pt advised on healthy life style and exercise including yoga and to call us if her symptoms are not getting better.  -- Continue to follow up with PCP, gyne, and GI as recommended.    #Osteopenia  -- Continue Vitamin D and Calcium supplement.  -- Advised weight bearing exercises.  -- Next bone density is due on 12/2023.    #Gallbladder polyp  -- Continue follow up as recommended by GI.  -- Follow up with GI    All her concerns were addressed during the visit.  RTC in 6 months   .

## 2024-05-13 NOTE — PHYSICAL EXAM
[Fully active, able to carry on all pre-disease performance without restriction] : Status 0 - Fully active, able to carry on all pre-disease performance without restriction [Normal] : affect appropriate [de-identified] : Bilateral mastectomy with reconstruction; breast and chest wall exam is unrevealing

## 2024-05-13 NOTE — HISTORY OF PRESENT ILLNESS
[de-identified] : This is a 45 premenopausal woman being seen for wt loss.\par  Pt has Lost 15 lbs in the last 6 mths. \par  She has a good appetite.\par  C/o having drenching  night sweats more often.\par  She is known to have enlarged  neck Lns, saw ENt however could not get FNA as the nodes were too small.\par  Had recent change in bowel habits over the last month or so with constipation and diarrhea. She has a follow up with GI.\par  Has heavy periods, has fibroids, will see GYN \par  She has an enlarged LNs in the groin\par  colonoscopy done in 12/2/19 showed 7mm ascending colon polyp.\par  EGD non erosive gastritis\par  \par  She was diagnosed with pathologic stage IIB T2N1M0 HR+, HER2 negative IDC of right breast in 4/21.\par  \par  ONCOTYPE DX RS 32.\par  RR at 7 yrs 25% with endocrine therapy\par   benefit from chemo 15%\par  \par  Genetic testing did not reveal any mutations. \par  \par  PT IS S/P ADJUVANT CHEMO WITH AC-T completed in 11/21\par  S/P RT in 1/22\par  S/P TLH/BSO in 1/22\par  \par  Started Anastrazole last week of Jan 2022 \par  Bone density on 12/13/22 showed osteopenia of the AP spine with T score -1.3 [de-identified] : ONCOTYPE DX RS 32.\par  RR at 9 yrs 25%.\par  With chemo benefit from chemo 15% [de-identified] : 5/4/23  Patient here for follow up, feeling well.  She denies any new complaints.  Patient denies any new palpable chest wall  lumps or pain, denies skin changes, .  Patient denies cough, shortness of breath, denies fever, denies bone pain. goes for acupuncture and cupping. Planning on getting breast reconstructive surgery revision  11/14/23 Pt is here for follow up. Feels well  Has been tolerating AI well. Followed with GI regarding GB polyp. UTD with colonoscopy 5/7/24 Pt is feeling well. Compliant with meds. No breast related complaints

## 2024-05-27 ENCOUNTER — LABORATORY RESULT (OUTPATIENT)
Age: 49
End: 2024-05-27

## 2024-05-27 LAB
ALBUMIN SERPL ELPH-MCNC: 4.8 G/DL
ALP BLD-CCNC: 98 U/L
ALT SERPL-CCNC: 15 U/L
ANION GAP SERPL CALC-SCNC: 16 MMOL/L
AST SERPL-CCNC: 21 U/L
BILIRUB SERPL-MCNC: 0.4 MG/DL
BUN SERPL-MCNC: 21 MG/DL
CALCIUM SERPL-MCNC: 9.4 MG/DL
CHLORIDE SERPL-SCNC: 104 MMOL/L
CHOLEST SERPL-MCNC: 239 MG/DL
CO2 SERPL-SCNC: 22 MMOL/L
CREAT SERPL-MCNC: 0.9 MG/DL
EGFR: 78 ML/MIN/1.73M2
GLUCOSE SERPL-MCNC: 107 MG/DL
HDLC SERPL-MCNC: 106 MG/DL
LDLC SERPL CALC-MCNC: 122 MG/DL
NONHDLC SERPL-MCNC: 133 MG/DL
POTASSIUM SERPL-SCNC: 4.2 MMOL/L
PROT SERPL-MCNC: 7.2 G/DL
SODIUM SERPL-SCNC: 142 MMOL/L
TRIGL SERPL-MCNC: 65 MG/DL

## 2024-05-28 LAB
HCT VFR BLD CALC: 39.5 %
HGB BLD-MCNC: 13.1 G/DL
MCHC RBC-ENTMCNC: 32 PG
MCHC RBC-ENTMCNC: 33.2 G/DL
MCV RBC AUTO: 96.3 FL
PLATELET # BLD AUTO: 300 K/UL
PMV BLD AUTO: 0 /100 WBCS
PMV BLD: 9.8 FL
RBC # BLD: 4.1 M/UL
RBC # FLD: 13.2 %
WBC # FLD AUTO: 5.38 K/UL

## 2024-05-30 LAB — APO B SERPL-MCNC: 89 MG/DL

## 2024-07-24 NOTE — LETTER CLOSING
[Consult Closing:] : Thank you for allowing me to participate in the care of this patient.  If you have any questions, please do not hesitate to contact me. [Sincerely yours,] : Sincerely yours, [FreeTextEntry3] : Elin Shah M.D. \par \par Electronically proofread and signed by:  Elin Shah MD\par Attending, Department of Radiation Medicine\par [facility]\par \par CC: Dr. Macias no

## 2024-07-30 ENCOUNTER — APPOINTMENT (OUTPATIENT)
Dept: CARDIOLOGY | Facility: CLINIC | Age: 49
End: 2024-07-30
Payer: COMMERCIAL

## 2024-07-30 VITALS
HEART RATE: 62 BPM | SYSTOLIC BLOOD PRESSURE: 102 MMHG | WEIGHT: 132 LBS | DIASTOLIC BLOOD PRESSURE: 74 MMHG | BODY MASS INDEX: 21.99 KG/M2 | HEIGHT: 65 IN

## 2024-07-30 VITALS — SYSTOLIC BLOOD PRESSURE: 118 MMHG | DIASTOLIC BLOOD PRESSURE: 70 MMHG

## 2024-07-30 DIAGNOSIS — R94.31 ABNORMAL ELECTROCARDIOGRAM [ECG] [EKG]: ICD-10-CM

## 2024-07-30 DIAGNOSIS — E78.5 HYPERLIPIDEMIA, UNSPECIFIED: ICD-10-CM

## 2024-07-30 PROCEDURE — 99214 OFFICE O/P EST MOD 30 MIN: CPT | Mod: 25

## 2024-07-30 PROCEDURE — 93000 ELECTROCARDIOGRAM COMPLETE: CPT

## 2024-07-30 NOTE — CARDIOLOGY SUMMARY
[de-identified] : 5- NSR Normal ECG .  11- NSR Normal ECG  5- NSR Normal ECG  11- NSR NS  T wave chagne  7- NSR NS  twave change  similar to previous ECG  [de-identified] : 12-7-2021 24 hour ambulatory BP monitor . Mild systolic HTN significant diastolic hypertension . <10% night time dip  [de-identified] : 6-9-2021 ETT Echo Completed 7 minutes and 31 seconds . No echo ischemia .abnormal ECG response  [de-identified] : 8- Normal Lv systolic function Trace MR trace TR  8-8-2022 Normal Lv systolic function Trace Mr mild TR   EF 55-60% Trace MR trace TR GLS -19.3%

## 2024-07-30 NOTE — ASSESSMENT
[FreeTextEntry1] : The patient has had no chest pain or SOB . LDL and HDL are increased . seen by Dr. ayon for lung nodules. She has a low 10 year ASCVD risk .but her Lipoprotein A is increased .  The patient has breast CA and has had mastectomy and reconstruction of breast . and TAHBSO . She is on hormone therapy for her breast CA as well and has had RT  and chemi including anthracyclines . LV systolic function remains normal . Had disucssion about elevated lipoprotein A . Discussed statins with her . She want to avoid this if possible. Will get CAC score . if 0 will continue to monitor

## 2024-07-30 NOTE — HISTORY OF PRESENT ILLNESS
[FreeTextEntry1] : The patient has been feeling well. No chest pain or SOB .  She is working out inthe gym without issues

## 2024-09-13 RX ORDER — CEPHALEXIN 500 MG/1
500 TABLET ORAL 3 TIMES DAILY
Qty: 3 | Refills: 2 | Status: ACTIVE | COMMUNITY
Start: 2024-09-13 | End: 1900-01-01

## 2024-09-13 RX ORDER — MONTELUKAST 10 MG/1
10 TABLET, FILM COATED ORAL
Qty: 30 | Refills: 1 | Status: ACTIVE | COMMUNITY
Start: 2024-09-13 | End: 1900-01-01

## 2024-10-04 ENCOUNTER — OUTPATIENT (OUTPATIENT)
Dept: OUTPATIENT SERVICES | Facility: HOSPITAL | Age: 49
LOS: 1 days | End: 2024-10-04
Payer: COMMERCIAL

## 2024-10-04 DIAGNOSIS — Z90.49 ACQUIRED ABSENCE OF OTHER SPECIFIED PARTS OF DIGESTIVE TRACT: Chronic | ICD-10-CM

## 2024-10-04 DIAGNOSIS — Z98.890 OTHER SPECIFIED POSTPROCEDURAL STATES: Chronic | ICD-10-CM

## 2024-10-04 DIAGNOSIS — Z00.8 ENCOUNTER FOR OTHER GENERAL EXAMINATION: ICD-10-CM

## 2024-10-04 DIAGNOSIS — Z90.13 ACQUIRED ABSENCE OF BILATERAL BREASTS AND NIPPLES: Chronic | ICD-10-CM

## 2024-10-04 DIAGNOSIS — M67.40 GANGLION, UNSPECIFIED SITE: Chronic | ICD-10-CM

## 2024-10-04 PROCEDURE — 76700 US EXAM ABDOM COMPLETE: CPT

## 2024-10-04 PROCEDURE — 76700 US EXAM ABDOM COMPLETE: CPT | Mod: 26

## 2024-10-05 DIAGNOSIS — R10.9 UNSPECIFIED ABDOMINAL PAIN: ICD-10-CM

## 2024-10-28 ENCOUNTER — APPOINTMENT (OUTPATIENT)
Dept: SURGERY | Facility: CLINIC | Age: 49
End: 2024-10-28
Payer: COMMERCIAL

## 2024-10-28 VITALS
HEART RATE: 96 BPM | OXYGEN SATURATION: 96 % | HEIGHT: 65 IN | DIASTOLIC BLOOD PRESSURE: 82 MMHG | BODY MASS INDEX: 21.83 KG/M2 | WEIGHT: 131 LBS | SYSTOLIC BLOOD PRESSURE: 140 MMHG

## 2024-10-28 DIAGNOSIS — Z90.13 ACQUIRED ABSENCE OF BILATERAL BREASTS AND NIPPLES: ICD-10-CM

## 2024-10-28 DIAGNOSIS — K82.4 CHOLESTEROLOSIS OF GALLBLADDER: ICD-10-CM

## 2024-10-28 DIAGNOSIS — Z90.710 ACQUIRED ABSENCE OF BOTH CERVIX AND UTERUS: ICD-10-CM

## 2024-10-28 DIAGNOSIS — E04.1 NONTOXIC SINGLE THYROID NODULE: ICD-10-CM

## 2024-10-28 DIAGNOSIS — Z98.890 OTHER SPECIFIED POSTPROCEDURAL STATES: ICD-10-CM

## 2024-10-28 DIAGNOSIS — Z90.79 ACQUIRED ABSENCE OF BOTH CERVIX AND UTERUS: ICD-10-CM

## 2024-10-28 DIAGNOSIS — Z90.722 ACQUIRED ABSENCE OF BOTH CERVIX AND UTERUS: ICD-10-CM

## 2024-10-28 PROCEDURE — 99213 OFFICE O/P EST LOW 20 MIN: CPT

## 2024-10-30 RX ORDER — OMEPRAZOLE 40 MG/1
40 CAPSULE, DELAYED RELEASE ORAL
Qty: 90 | Refills: 0 | Status: ACTIVE | COMMUNITY
Start: 2023-09-20

## 2024-10-30 RX ORDER — METOPROLOL TARTRATE 75 MG/1
TABLET, FILM COATED ORAL
Refills: 0 | Status: ACTIVE | COMMUNITY

## 2024-10-30 RX ORDER — ALPRAZOLAM 0.5 MG/1
0.5 TABLET ORAL
Qty: 30 | Refills: 0 | Status: ACTIVE | COMMUNITY
Start: 2024-08-26

## 2024-10-30 RX ORDER — SERTRALINE HYDROCHLORIDE 25 MG/1
TABLET, FILM COATED ORAL
Refills: 0 | Status: ACTIVE | COMMUNITY

## 2024-11-08 ENCOUNTER — APPOINTMENT (OUTPATIENT)
Dept: PLASTIC SURGERY | Facility: CLINIC | Age: 49
End: 2024-11-08
Payer: COMMERCIAL

## 2024-11-08 PROCEDURE — G2211 COMPLEX E/M VISIT ADD ON: CPT | Mod: NC

## 2024-11-08 PROCEDURE — 99213 OFFICE O/P EST LOW 20 MIN: CPT

## 2024-11-15 ENCOUNTER — APPOINTMENT (OUTPATIENT)
Dept: CARDIOLOGY | Facility: CLINIC | Age: 49
End: 2024-11-15
Payer: COMMERCIAL

## 2024-11-15 VITALS — DIASTOLIC BLOOD PRESSURE: 70 MMHG | HEART RATE: 64 BPM | SYSTOLIC BLOOD PRESSURE: 118 MMHG

## 2024-11-15 VITALS — BODY MASS INDEX: 22.16 KG/M2 | WEIGHT: 133 LBS | HEIGHT: 65 IN

## 2024-11-15 DIAGNOSIS — R59.0 LOCALIZED ENLARGED LYMPH NODES: ICD-10-CM

## 2024-11-15 DIAGNOSIS — R94.31 ABNORMAL ELECTROCARDIOGRAM [ECG] [EKG]: ICD-10-CM

## 2024-11-15 DIAGNOSIS — Z91.89 OTHER SPECIFIED PERSONAL RISK FACTORS, NOT ELSEWHERE CLASSIFIED: ICD-10-CM

## 2024-11-15 DIAGNOSIS — R19.7 DIARRHEA, UNSPECIFIED: ICD-10-CM

## 2024-11-15 PROCEDURE — 99214 OFFICE O/P EST MOD 30 MIN: CPT | Mod: 25

## 2024-11-15 PROCEDURE — 93000 ELECTROCARDIOGRAM COMPLETE: CPT

## 2024-11-15 RX ORDER — SERTRALINE HYDROCHLORIDE 50 MG/1
50 TABLET, FILM COATED ORAL
Refills: 0 | Status: ACTIVE | COMMUNITY

## 2024-11-19 ENCOUNTER — NON-APPOINTMENT (OUTPATIENT)
Age: 49
End: 2024-11-19

## 2024-11-20 ENCOUNTER — OUTPATIENT (OUTPATIENT)
Dept: OUTPATIENT SERVICES | Facility: HOSPITAL | Age: 49
LOS: 1 days | End: 2024-11-20
Payer: COMMERCIAL

## 2024-11-20 ENCOUNTER — APPOINTMENT (OUTPATIENT)
Age: 49
End: 2024-11-20
Payer: COMMERCIAL

## 2024-11-20 ENCOUNTER — APPOINTMENT (OUTPATIENT)
Dept: BREAST CENTER | Facility: CLINIC | Age: 49
End: 2024-11-20
Payer: COMMERCIAL

## 2024-11-20 VITALS
DIASTOLIC BLOOD PRESSURE: 68 MMHG | BODY MASS INDEX: 22.71 KG/M2 | WEIGHT: 136.3 LBS | SYSTOLIC BLOOD PRESSURE: 111 MMHG | RESPIRATION RATE: 17 BRPM | HEART RATE: 69 BPM | TEMPERATURE: 97.9 F | HEIGHT: 65 IN | OXYGEN SATURATION: 100 %

## 2024-11-20 DIAGNOSIS — C50.919 MALIGNANT NEOPLASM OF UNSPECIFIED SITE OF UNSPECIFIED FEMALE BREAST: ICD-10-CM

## 2024-11-20 DIAGNOSIS — Z98.890 OTHER SPECIFIED POSTPROCEDURAL STATES: ICD-10-CM

## 2024-11-20 DIAGNOSIS — Z17.0 MALIGNANT NEOPLASM OF UPPER-OUTER QUADRANT OF RIGHT FEMALE BREAST: ICD-10-CM

## 2024-11-20 DIAGNOSIS — Z90.49 ACQUIRED ABSENCE OF OTHER SPECIFIED PARTS OF DIGESTIVE TRACT: Chronic | ICD-10-CM

## 2024-11-20 DIAGNOSIS — M67.40 GANGLION, UNSPECIFIED SITE: Chronic | ICD-10-CM

## 2024-11-20 DIAGNOSIS — Z90.13 ACQUIRED ABSENCE OF BILATERAL BREASTS AND NIPPLES: Chronic | ICD-10-CM

## 2024-11-20 DIAGNOSIS — C50.411 MALIGNANT NEOPLASM OF UPPER-OUTER QUADRANT OF RIGHT FEMALE BREAST: ICD-10-CM

## 2024-11-20 DIAGNOSIS — Z98.890 OTHER SPECIFIED POSTPROCEDURAL STATES: Chronic | ICD-10-CM

## 2024-11-20 DIAGNOSIS — Z90.13 ACQUIRED ABSENCE OF BILATERAL BREASTS AND NIPPLES: ICD-10-CM

## 2024-11-20 PROCEDURE — 99214 OFFICE O/P EST MOD 30 MIN: CPT

## 2024-11-20 PROCEDURE — G2211 COMPLEX E/M VISIT ADD ON: CPT

## 2024-11-21 DIAGNOSIS — C50.919 MALIGNANT NEOPLASM OF UNSPECIFIED SITE OF UNSPECIFIED FEMALE BREAST: ICD-10-CM

## 2024-11-26 ENCOUNTER — OUTPATIENT (OUTPATIENT)
Dept: OUTPATIENT SERVICES | Facility: HOSPITAL | Age: 49
LOS: 1 days | End: 2024-11-26
Payer: COMMERCIAL

## 2024-11-26 VITALS
HEART RATE: 69 BPM | TEMPERATURE: 98 F | OXYGEN SATURATION: 98 % | SYSTOLIC BLOOD PRESSURE: 124 MMHG | WEIGHT: 132.28 LBS | DIASTOLIC BLOOD PRESSURE: 81 MMHG | HEIGHT: 65.5 IN | RESPIRATION RATE: 16 BRPM

## 2024-11-26 DIAGNOSIS — Z01.818 ENCOUNTER FOR OTHER PREPROCEDURAL EXAMINATION: ICD-10-CM

## 2024-11-26 DIAGNOSIS — Z90.13 ACQUIRED ABSENCE OF BILATERAL BREASTS AND NIPPLES: Chronic | ICD-10-CM

## 2024-11-26 DIAGNOSIS — M67.40 GANGLION, UNSPECIFIED SITE: Chronic | ICD-10-CM

## 2024-11-26 DIAGNOSIS — Z90.710 ACQUIRED ABSENCE OF BOTH CERVIX AND UTERUS: Chronic | ICD-10-CM

## 2024-11-26 DIAGNOSIS — Z98.890 OTHER SPECIFIED POSTPROCEDURAL STATES: Chronic | ICD-10-CM

## 2024-11-26 DIAGNOSIS — Z90.49 ACQUIRED ABSENCE OF OTHER SPECIFIED PARTS OF DIGESTIVE TRACT: Chronic | ICD-10-CM

## 2024-11-26 DIAGNOSIS — K82.4 CHOLESTEROLOSIS OF GALLBLADDER: ICD-10-CM

## 2024-11-26 LAB
A1C WITH ESTIMATED AVERAGE GLUCOSE RESULT: 5.7 % — HIGH (ref 4–5.6)
ALBUMIN SERPL ELPH-MCNC: 4.9 G/DL — SIGNIFICANT CHANGE UP (ref 3.5–5.2)
ALP SERPL-CCNC: 86 U/L — SIGNIFICANT CHANGE UP (ref 30–115)
ALT FLD-CCNC: 17 U/L — SIGNIFICANT CHANGE UP (ref 0–41)
ANION GAP SERPL CALC-SCNC: 14 MMOL/L — SIGNIFICANT CHANGE UP (ref 7–14)
AST SERPL-CCNC: 23 U/L — SIGNIFICANT CHANGE UP (ref 0–41)
BASOPHILS # BLD AUTO: 0.08 K/UL — SIGNIFICANT CHANGE UP (ref 0–0.2)
BASOPHILS NFR BLD AUTO: 1.8 % — HIGH (ref 0–1)
BILIRUB SERPL-MCNC: 0.4 MG/DL — SIGNIFICANT CHANGE UP (ref 0.2–1.2)
BUN SERPL-MCNC: 18 MG/DL — SIGNIFICANT CHANGE UP (ref 10–20)
CALCIUM SERPL-MCNC: 10.1 MG/DL — SIGNIFICANT CHANGE UP (ref 8.4–10.5)
CHLORIDE SERPL-SCNC: 101 MMOL/L — SIGNIFICANT CHANGE UP (ref 98–110)
CO2 SERPL-SCNC: 25 MMOL/L — SIGNIFICANT CHANGE UP (ref 17–32)
CREAT SERPL-MCNC: 1 MG/DL — SIGNIFICANT CHANGE UP (ref 0.7–1.5)
EGFR: 68 ML/MIN/1.73M2 — SIGNIFICANT CHANGE UP
EOSINOPHIL # BLD AUTO: 0.16 K/UL — SIGNIFICANT CHANGE UP (ref 0–0.7)
EOSINOPHIL NFR BLD AUTO: 3.5 % — SIGNIFICANT CHANGE UP (ref 0–8)
ESTIMATED AVERAGE GLUCOSE: 117 MG/DL — HIGH (ref 68–114)
GLUCOSE SERPL-MCNC: 107 MG/DL — HIGH (ref 70–99)
HCT VFR BLD CALC: 39.5 % — SIGNIFICANT CHANGE UP (ref 37–47)
HGB BLD-MCNC: 13.1 G/DL — SIGNIFICANT CHANGE UP (ref 12–16)
IMM GRANULOCYTES NFR BLD AUTO: 0.2 % — SIGNIFICANT CHANGE UP (ref 0.1–0.3)
LYMPHOCYTES # BLD AUTO: 1.71 K/UL — SIGNIFICANT CHANGE UP (ref 1.2–3.4)
LYMPHOCYTES # BLD AUTO: 37.8 % — SIGNIFICANT CHANGE UP (ref 20.5–51.1)
MCHC RBC-ENTMCNC: 31.6 PG — HIGH (ref 27–31)
MCHC RBC-ENTMCNC: 33.2 G/DL — SIGNIFICANT CHANGE UP (ref 32–37)
MCV RBC AUTO: 95.2 FL — SIGNIFICANT CHANGE UP (ref 81–99)
MONOCYTES # BLD AUTO: 0.56 K/UL — SIGNIFICANT CHANGE UP (ref 0.1–0.6)
MONOCYTES NFR BLD AUTO: 12.4 % — HIGH (ref 1.7–9.3)
NEUTROPHILS # BLD AUTO: 2 K/UL — SIGNIFICANT CHANGE UP (ref 1.4–6.5)
NEUTROPHILS NFR BLD AUTO: 44.3 % — SIGNIFICANT CHANGE UP (ref 42.2–75.2)
NRBC # BLD: 0 /100 WBCS — SIGNIFICANT CHANGE UP (ref 0–0)
PLATELET # BLD AUTO: 306 K/UL — SIGNIFICANT CHANGE UP (ref 130–400)
PMV BLD: 9.6 FL — SIGNIFICANT CHANGE UP (ref 7.4–10.4)
POTASSIUM SERPL-MCNC: 4.2 MMOL/L — SIGNIFICANT CHANGE UP (ref 3.5–5)
POTASSIUM SERPL-SCNC: 4.2 MMOL/L — SIGNIFICANT CHANGE UP (ref 3.5–5)
PROT SERPL-MCNC: 7.5 G/DL — SIGNIFICANT CHANGE UP (ref 6–8)
RBC # BLD: 4.15 M/UL — LOW (ref 4.2–5.4)
RBC # FLD: 13.5 % — SIGNIFICANT CHANGE UP (ref 11.5–14.5)
SODIUM SERPL-SCNC: 140 MMOL/L — SIGNIFICANT CHANGE UP (ref 135–146)
WBC # BLD: 4.52 K/UL — LOW (ref 4.8–10.8)
WBC # FLD AUTO: 4.52 K/UL — LOW (ref 4.8–10.8)

## 2024-11-26 PROCEDURE — 80053 COMPREHEN METABOLIC PANEL: CPT

## 2024-11-26 PROCEDURE — 83036 HEMOGLOBIN GLYCOSYLATED A1C: CPT

## 2024-11-26 PROCEDURE — 85025 COMPLETE CBC W/AUTO DIFF WBC: CPT

## 2024-11-26 PROCEDURE — 99214 OFFICE O/P EST MOD 30 MIN: CPT | Mod: 25

## 2024-11-26 PROCEDURE — 36415 COLL VENOUS BLD VENIPUNCTURE: CPT

## 2024-11-26 NOTE — H&P PST ADULT - CARDIOVASCULAR
normal/regular rate and rhythm/S1 S2 present/no gallops/no rub/no murmur normal/regular rate and rhythm/S1 S2 present/no gallops/no rub/no murmur/irregular rate and rhythm

## 2024-11-26 NOTE — H&P PST ADULT - HISTORY OF PRESENT ILLNESS
48 Y/O FEMALE PT TO PAST WITH C/O SLUDGE TO GALLBLADDER, PT C/O RUQ PAIN FOR               PT NOW FOR SCHEDULED PROCEDURE (  CHOLECYSTECTOMY Y WITH FLORESCENT CHOLANGIOGRAM . PT DENIES ANY CP SOB PALP COUGH DYSURIA FEVER URI.   RCRI CLASS I  DASI 10 METS  Anesthesia Alert  NO--Difficult Airway  NO--History of neck surgery or radiation  NO--Limited ROM of neck  NO--History of Malignant hyperthermia  NO--Personal or family history of Pseudocholinesterase deficiency.  NO--Prior Anesthesia Complication  NO--Latex Allergy  NO--Loose teeth  NO--History of Rheumatoid Arthritis  NO--MOHSEN  NO--Bleeding risk  NO--Other_____     50 Y/O FEMALE PT TO PAST WITH C/O POLYPS TO GB TO GALLBLADDER, FOR 3 YRS, INCREASED IN SIZE 10/4 - ADVISED REMOVAL , DENIES PAIN  PT NOW FOR SCHEDULED PROCEDURE (  CHOLECYSTECTOMY Y WITH FLORESCENT CHOLANGIOGRAM )  PT DENIES ANY CP SOB PALP COUGH DYSURIA FEVER URI.   RCRI CLASS I  DASI 10 METS  Anesthesia Alert  NO--Difficult Airway  NO--History of neck surgery or radiation  NO--Limited ROM of neck  NO--History of Malignant hyperthermia  NO--Personal or family history of Pseudocholinesterase deficiency.  NO--Prior Anesthesia Complication  NO--Latex Allergy  NO--Loose teeth  NO--History of Rheumatoid Arthritis  NO--MOHSEN  NO--Bleeding risk  NO--Other_____     48 Y/O FEMALE PT TO PAST WITH C/O POLYPS TO GB TO GALLBLADDER, FOR 3 YRS, INCREASED IN SIZE 10/4 - ADVISED REMOVAL , DENIES PAIN  PT NOW FOR SCHEDULED PROCEDURE (  CHOLECYSTECTOMY Y WITH FLORESCENT CHOLANGIOGRAM )  PT DENIES ANY CP SOB PALP COUGH DYSURIA FEVER URI.   RCRI CLASS I  DASI 10 METS  *** ALLERGY DERMABOND, CERTAIN ADHESIVES***  Anesthesia Alert  NO--Difficult Airway  NO--History of neck surgery or radiation  NO--Limited ROM of neck  NO--History of Malignant hyperthermia  NO--Personal or family history of Pseudocholinesterase deficiency.  NO--Prior Anesthesia Complication  NO--Latex Allergy  NO--Loose teeth  NO--History of Rheumatoid Arthritis  NO--MOHSEN  NO--Bleeding risk  NO--Other_____

## 2024-11-26 NOTE — H&P PST ADULT - NSICDXPASTSURGICALHX_GEN_ALL_CORE_FT
PAST SURGICAL HISTORY:  Ganglion cyst     H/O bilateral mastectomy expanders    H/O colonoscopy 2014    History of appendectomy     History of D&C     History of surgery PORT PLACEMENT   REMOVAL OD PORT     PAST SURGICAL HISTORY:  Ganglion cyst     H/O bilateral mastectomy expanders    H/O colonoscopy 2014    History of appendectomy     History of D&C     History of surgery PORT PLACEMENT   REMOVAL OD PORT    S/P PAUL-BSO

## 2024-11-27 DIAGNOSIS — Z01.818 ENCOUNTER FOR OTHER PREPROCEDURAL EXAMINATION: ICD-10-CM

## 2024-11-27 DIAGNOSIS — K82.4 CHOLESTEROLOSIS OF GALLBLADDER: ICD-10-CM

## 2024-12-10 NOTE — ASU PATIENT PROFILE, ADULT - NSICDXPASTSURGICALHX_GEN_ALL_CORE_FT
PAST SURGICAL HISTORY:  Ganglion cyst     H/O bilateral mastectomy expanders    H/O colonoscopy 2014    History of appendectomy     History of D&C     History of surgery PORT PLACEMENT   REMOVAL OD PORT    S/P PAUL-BSO

## 2024-12-11 ENCOUNTER — TRANSCRIPTION ENCOUNTER (OUTPATIENT)
Age: 49
End: 2024-12-11

## 2024-12-11 ENCOUNTER — APPOINTMENT (OUTPATIENT)
Dept: SURGERY | Facility: AMBULATORY SURGERY CENTER | Age: 49
End: 2024-12-11

## 2024-12-11 ENCOUNTER — RESULT REVIEW (OUTPATIENT)
Age: 49
End: 2024-12-11

## 2024-12-11 ENCOUNTER — OUTPATIENT (OUTPATIENT)
Dept: OUTPATIENT SERVICES | Facility: HOSPITAL | Age: 49
LOS: 1 days | Discharge: HOME | End: 2024-12-11
Payer: COMMERCIAL

## 2024-12-11 VITALS — HEIGHT: 65.5 IN | WEIGHT: 132.28 LBS

## 2024-12-11 VITALS
OXYGEN SATURATION: 97 % | RESPIRATION RATE: 22 BRPM | HEART RATE: 64 BPM | SYSTOLIC BLOOD PRESSURE: 136 MMHG | DIASTOLIC BLOOD PRESSURE: 82 MMHG

## 2024-12-11 DIAGNOSIS — Z98.890 OTHER SPECIFIED POSTPROCEDURAL STATES: Chronic | ICD-10-CM

## 2024-12-11 DIAGNOSIS — Z85.3 PERSONAL HISTORY OF MALIGNANT NEOPLASM OF BREAST: ICD-10-CM

## 2024-12-11 DIAGNOSIS — F41.9 ANXIETY DISORDER, UNSPECIFIED: ICD-10-CM

## 2024-12-11 DIAGNOSIS — Z90.13 ACQUIRED ABSENCE OF BILATERAL BREASTS AND NIPPLES: Chronic | ICD-10-CM

## 2024-12-11 DIAGNOSIS — Z79.811 LONG TERM (CURRENT) USE OF AROMATASE INHIBITORS: ICD-10-CM

## 2024-12-11 DIAGNOSIS — Z88.1 ALLERGY STATUS TO OTHER ANTIBIOTIC AGENTS: ICD-10-CM

## 2024-12-11 DIAGNOSIS — Z92.3 PERSONAL HISTORY OF IRRADIATION: ICD-10-CM

## 2024-12-11 DIAGNOSIS — K82.4 CHOLESTEROLOSIS OF GALLBLADDER: ICD-10-CM

## 2024-12-11 DIAGNOSIS — Z90.710 ACQUIRED ABSENCE OF BOTH CERVIX AND UTERUS: Chronic | ICD-10-CM

## 2024-12-11 DIAGNOSIS — I10 ESSENTIAL (PRIMARY) HYPERTENSION: ICD-10-CM

## 2024-12-11 DIAGNOSIS — Z90.49 ACQUIRED ABSENCE OF OTHER SPECIFIED PARTS OF DIGESTIVE TRACT: Chronic | ICD-10-CM

## 2024-12-11 DIAGNOSIS — K80.10 CALCULUS OF GALLBLADDER WITH CHRONIC CHOLECYSTITIS WITHOUT OBSTRUCTION: ICD-10-CM

## 2024-12-11 DIAGNOSIS — M67.40 GANGLION, UNSPECIFIED SITE: Chronic | ICD-10-CM

## 2024-12-11 DIAGNOSIS — Z92.21 PERSONAL HISTORY OF ANTINEOPLASTIC CHEMOTHERAPY: ICD-10-CM

## 2024-12-11 PROCEDURE — 88304 TISSUE EXAM BY PATHOLOGIST: CPT | Mod: 26

## 2024-12-11 PROCEDURE — C1889: CPT

## 2024-12-11 PROCEDURE — S2900: CPT

## 2024-12-11 PROCEDURE — 88304 TISSUE EXAM BY PATHOLOGIST: CPT

## 2024-12-11 PROCEDURE — C9399: CPT

## 2024-12-11 PROCEDURE — 47563 LAPARO CHOLECYSTECTOMY/GRAPH: CPT

## 2024-12-11 RX ORDER — ACETAMINOPHEN AND CODEINE PHOSPHATE 300; 15 MG/1; MG/1
1 TABLET ORAL
Qty: 15 | Refills: 0
Start: 2024-12-11 | End: 2024-12-15

## 2024-12-11 RX ORDER — METOCLOPRAMIDE HYDROCHLORIDE 10 MG/1
10 TABLET ORAL ONCE
Refills: 0 | Status: COMPLETED | OUTPATIENT
Start: 2024-12-11 | End: 2024-12-11

## 2024-12-11 RX ORDER — ALPRAZOLAM 0.5 MG
0.5 TABLET ORAL
Refills: 0 | DISCHARGE

## 2024-12-11 RX ORDER — MAGNESIUM, ALUMINUM HYDROXIDE 200-225/5
30 SUSPENSION, ORAL (FINAL DOSE FORM) ORAL ONCE
Refills: 0 | Status: COMPLETED | OUTPATIENT
Start: 2024-12-11 | End: 2024-12-11

## 2024-12-11 RX ORDER — APREPITANT 40 MG/1
40 CAPSULE ORAL ONCE
Refills: 0 | Status: DISCONTINUED | OUTPATIENT
Start: 2024-12-11 | End: 2024-12-11

## 2024-12-11 RX ORDER — ONDANSETRON HYDROCHLORIDE 4 MG/1
4 TABLET, FILM COATED ORAL ONCE
Refills: 0 | Status: DISCONTINUED | OUTPATIENT
Start: 2024-12-11 | End: 2024-12-11

## 2024-12-11 RX ORDER — HYDROMORPHONE HYDROCHLORIDE 2 MG/1
0.5 TABLET ORAL
Refills: 0 | Status: DISCONTINUED | OUTPATIENT
Start: 2024-12-11 | End: 2024-12-11

## 2024-12-11 RX ORDER — 0.9 % SODIUM CHLORIDE 0.9 %
1000 INTRAVENOUS SOLUTION INTRAVENOUS
Refills: 0 | Status: DISCONTINUED | OUTPATIENT
Start: 2024-12-11 | End: 2024-12-11

## 2024-12-11 RX ADMIN — HYDROMORPHONE HYDROCHLORIDE 0.5 MILLIGRAM(S): 2 TABLET ORAL at 10:59

## 2024-12-11 RX ADMIN — Medication 30 MILLILITER(S): at 11:42

## 2024-12-11 RX ADMIN — METOCLOPRAMIDE HYDROCHLORIDE 10 MILLIGRAM(S): 10 TABLET ORAL at 12:15

## 2024-12-11 NOTE — ASU DISCHARGE PLAN (ADULT/PEDIATRIC) - FINANCIAL ASSISTANCE
[facility] provides services at a reduced cost to those who are determined to be eligible through [facility]’s financial assistance program. Information regarding [facility]’s financial assistance program can be found by going to [email or web address] or by calling [phone].

## 2024-12-11 NOTE — BRIEF OPERATIVE NOTE - OPERATION/FINDINGS
A robotic assisted cholecystectomy was performed with visualization of the critical view of safety. The cystic artery and cystic duct were both clipped and the gallbladder was dissected off the liver.

## 2024-12-11 NOTE — ASU DISCHARGE PLAN (ADULT/PEDIATRIC) - CARE PROVIDER_API CALL
Kristian Brown  Surgery  [address] [place] [id number]  Phone: [phone]  Fax: [phone]  Follow Up Time: 1 week

## 2024-12-11 NOTE — ASU DISCHARGE PLAN (ADULT/PEDIATRIC) - ASU DC SPECIAL INSTRUCTIONSFT
Activity: No heavy lifting > 10 lbs for 6 weeks. Avoid straining or excessive activity x 6 weeks.     Dressings: Remove outer dressings in  72 hours and steri strips underneath will fall off on their own. Do not scrub wounds. You may shower  but do not bathe. May use ice packs for pain and swelling.     Pain control: You may take over-the-counter tylenol and motrin three times per day with food for up to 3 days. Tylenol with codeine was sent to your pharmacy, take 1-2 tablets every 6 hours as needed for severe pain. Please do not drive, operate machinery, or make important decisions while taking this medication. Please take only for severe pain.     Diet: Avoid greasy/fatty foods, avoid spicy foods.      Follow up: Please call the number provided to make an appointment with Dr. Brown during the scheduled time.. Please call with any questions or concerns including fevers, worsening pain, pus from the wounds, or redness of the skin.

## 2024-12-11 NOTE — ASU DISCHARGE PLAN (ADULT/PEDIATRIC) - NS MD DC FALL RISK RISK
For information on Fall & Injury Prevention, visit: [email or web address] OR  [email or web address] OR  https://www.cdc.gov/steadi/patient.html

## 2024-12-11 NOTE — ASU DISCHARGE PLAN (ADULT/PEDIATRIC) - MEDICATION INSTRUCTIONS
over-the-counter tylenol and motrin three times per day with food for up to 3 days. Tylenol with codeine was sent to your pharmacy, take 1-2 tablets every 6 hours as needed for severe pain. Please do not drive, operate machinery, or make important decisions while taking this medication. Please take only for severe pain.

## 2024-12-19 ENCOUNTER — NON-APPOINTMENT (OUTPATIENT)
Age: 49
End: 2024-12-19

## 2024-12-19 ENCOUNTER — APPOINTMENT (OUTPATIENT)
Dept: SURGERY | Facility: CLINIC | Age: 49
End: 2024-12-19
Payer: COMMERCIAL

## 2024-12-19 VITALS
DIASTOLIC BLOOD PRESSURE: 68 MMHG | BODY MASS INDEX: 21.66 KG/M2 | HEIGHT: 65 IN | HEART RATE: 70 BPM | SYSTOLIC BLOOD PRESSURE: 120 MMHG | WEIGHT: 130 LBS

## 2024-12-19 DIAGNOSIS — K82.4 CHOLESTEROLOSIS OF GALLBLADDER: ICD-10-CM

## 2024-12-19 LAB — SURGICAL PATHOLOGY STUDY: SIGNIFICANT CHANGE UP

## 2024-12-19 PROCEDURE — 99024 POSTOP FOLLOW-UP VISIT: CPT

## 2025-01-13 NOTE — H&P PST ADULT - NS PRO REGISTERED ORGAN DONOR
Patient has a history of opioid use disorder in early remission on MAT  Patient was being treated in the [facility] program for opioid use disorder with Suboxone 2 mg once daily  Dose of Suboxone was confirmed by patient's outpatient pharmacy, [facility], and PDMP    PLAN:  Continue patient's home Suboxone at 2 mg sublingual once daily for opioid dependence     No

## 2025-01-14 ENCOUNTER — APPOINTMENT (OUTPATIENT)
Dept: CARDIOLOGY | Facility: CLINIC | Age: 50
End: 2025-01-14

## 2025-04-01 ENCOUNTER — APPOINTMENT (OUTPATIENT)
Dept: CARDIOLOGY | Facility: CLINIC | Age: 50
End: 2025-04-01

## 2025-05-06 ENCOUNTER — APPOINTMENT (OUTPATIENT)
Dept: CARDIOLOGY | Facility: CLINIC | Age: 50
End: 2025-05-06
Payer: COMMERCIAL

## 2025-05-06 ENCOUNTER — OUTPATIENT (OUTPATIENT)
Dept: OUTPATIENT SERVICES | Facility: HOSPITAL | Age: 50
LOS: 1 days | End: 2025-05-06
Payer: COMMERCIAL

## 2025-05-06 ENCOUNTER — APPOINTMENT (OUTPATIENT)
Dept: GYNECOLOGIC ONCOLOGY | Facility: CLINIC | Age: 50
End: 2025-05-06
Payer: COMMERCIAL

## 2025-05-06 ENCOUNTER — APPOINTMENT (OUTPATIENT)
Dept: RADIATION ONCOLOGY | Facility: HOSPITAL | Age: 50
End: 2025-05-06
Payer: COMMERCIAL

## 2025-05-06 VITALS
HEART RATE: 91 BPM | OXYGEN SATURATION: 100 % | TEMPERATURE: 98.2 F | WEIGHT: 138.25 LBS | SYSTOLIC BLOOD PRESSURE: 137 MMHG | BODY MASS INDEX: 23.01 KG/M2 | RESPIRATION RATE: 16 BRPM | DIASTOLIC BLOOD PRESSURE: 78 MMHG

## 2025-05-06 VITALS
BODY MASS INDEX: 22.99 KG/M2 | DIASTOLIC BLOOD PRESSURE: 93 MMHG | SYSTOLIC BLOOD PRESSURE: 136 MMHG | HEIGHT: 65 IN | WEIGHT: 138 LBS | HEART RATE: 82 BPM

## 2025-05-06 DIAGNOSIS — I10 ESSENTIAL (PRIMARY) HYPERTENSION: ICD-10-CM

## 2025-05-06 DIAGNOSIS — C50.411 MALIGNANT NEOPLASM OF UPPER-OUTER QUADRANT OF RIGHT FEMALE BREAST: ICD-10-CM

## 2025-05-06 DIAGNOSIS — Z98.890 OTHER SPECIFIED POSTPROCEDURAL STATES: Chronic | ICD-10-CM

## 2025-05-06 DIAGNOSIS — Z90.722 ACQUIRED ABSENCE OF BOTH CERVIX AND UTERUS: ICD-10-CM

## 2025-05-06 DIAGNOSIS — Z90.13 ACQUIRED ABSENCE OF BILATERAL BREASTS AND NIPPLES: ICD-10-CM

## 2025-05-06 DIAGNOSIS — M67.40 GANGLION, UNSPECIFIED SITE: Chronic | ICD-10-CM

## 2025-05-06 DIAGNOSIS — N95.2 POSTMENOPAUSAL ATROPHIC VAGINITIS: ICD-10-CM

## 2025-05-06 DIAGNOSIS — R94.31 ABNORMAL ELECTROCARDIOGRAM [ECG] [EKG]: ICD-10-CM

## 2025-05-06 DIAGNOSIS — Z90.710 ACQUIRED ABSENCE OF BOTH CERVIX AND UTERUS: Chronic | ICD-10-CM

## 2025-05-06 DIAGNOSIS — Z90.710 ACQUIRED ABSENCE OF BOTH CERVIX AND UTERUS: ICD-10-CM

## 2025-05-06 DIAGNOSIS — Z90.49 ACQUIRED ABSENCE OF OTHER SPECIFIED PARTS OF DIGESTIVE TRACT: Chronic | ICD-10-CM

## 2025-05-06 DIAGNOSIS — Z92.3 PERSONAL HISTORY OF IRRADIATION: ICD-10-CM

## 2025-05-06 DIAGNOSIS — Z90.13 ACQUIRED ABSENCE OF BILATERAL BREASTS AND NIPPLES: Chronic | ICD-10-CM

## 2025-05-06 DIAGNOSIS — Z85.3 PERSONAL HISTORY OF MALIGNANT NEOPLASM OF BREAST: ICD-10-CM

## 2025-05-06 DIAGNOSIS — Z90.79 ACQUIRED ABSENCE OF BOTH CERVIX AND UTERUS: ICD-10-CM

## 2025-05-06 DIAGNOSIS — L90.0 LICHEN SCLEROSUS ET ATROPHICUS: ICD-10-CM

## 2025-05-06 PROCEDURE — 99459 PELVIC EXAMINATION: CPT

## 2025-05-06 PROCEDURE — 99213 OFFICE O/P EST LOW 20 MIN: CPT

## 2025-05-06 PROCEDURE — G2211 COMPLEX E/M VISIT ADD ON: CPT | Mod: NC

## 2025-05-06 PROCEDURE — 93306 TTE W/DOPPLER COMPLETE: CPT

## 2025-05-06 RX ORDER — CLOBETASOL PROPIONATE 0.5 MG/G
0.05 OINTMENT TOPICAL TWICE DAILY
Qty: 1 | Refills: 1 | Status: ACTIVE | COMMUNITY
Start: 2025-05-06 | End: 1900-01-01

## 2025-05-06 RX ORDER — MULTIVIT-MIN/FOLIC/VIT K/LYCOP 400-300MCG
1000 TABLET ORAL
Refills: 0 | Status: ACTIVE | COMMUNITY

## 2025-05-10 PROBLEM — Z85.3: Status: RESOLVED | Noted: 2021-05-12 | Resolved: 2025-05-06

## 2025-05-20 ENCOUNTER — APPOINTMENT (OUTPATIENT)
Dept: CARDIOLOGY | Facility: CLINIC | Age: 50
End: 2025-05-20
Payer: COMMERCIAL

## 2025-05-20 ENCOUNTER — RESULT CHARGE (OUTPATIENT)
Age: 50
End: 2025-05-20

## 2025-05-20 ENCOUNTER — APPOINTMENT (OUTPATIENT)
Age: 50
End: 2025-05-20

## 2025-05-20 VITALS
WEIGHT: 138 LBS | SYSTOLIC BLOOD PRESSURE: 128 MMHG | HEART RATE: 78 BPM | DIASTOLIC BLOOD PRESSURE: 80 MMHG | BODY MASS INDEX: 22.96 KG/M2

## 2025-05-20 VITALS — HEIGHT: 65 IN

## 2025-05-20 DIAGNOSIS — C50.211 MALIGNANT NEOPLASM OF UPPER-INNER QUADRANT OF RIGHT FEMALE BREAST: ICD-10-CM

## 2025-05-20 DIAGNOSIS — C50.212 MALIGNANT NEOPLASM OF UPPER-INNER QUADRANT OF RIGHT FEMALE BREAST: ICD-10-CM

## 2025-05-20 DIAGNOSIS — Z91.89 OTHER SPECIFIED PERSONAL RISK FACTORS, NOT ELSEWHERE CLASSIFIED: ICD-10-CM

## 2025-05-20 DIAGNOSIS — Z01.818 ENCOUNTER FOR OTHER PREPROCEDURAL EXAMINATION: ICD-10-CM

## 2025-05-20 DIAGNOSIS — I10 ESSENTIAL (PRIMARY) HYPERTENSION: ICD-10-CM

## 2025-05-20 DIAGNOSIS — Z17.0 MALIGNANT NEOPLASM OF UPPER-INNER QUADRANT OF RIGHT FEMALE BREAST: ICD-10-CM

## 2025-05-20 DIAGNOSIS — R94.31 ABNORMAL ELECTROCARDIOGRAM [ECG] [EKG]: ICD-10-CM

## 2025-05-20 PROCEDURE — 93000 ELECTROCARDIOGRAM COMPLETE: CPT

## 2025-05-20 PROCEDURE — 99214 OFFICE O/P EST MOD 30 MIN: CPT | Mod: 25

## 2025-06-10 ENCOUNTER — OUTPATIENT (OUTPATIENT)
Dept: OUTPATIENT SERVICES | Facility: HOSPITAL | Age: 50
LOS: 1 days | End: 2025-06-10
Payer: COMMERCIAL

## 2025-06-10 ENCOUNTER — APPOINTMENT (OUTPATIENT)
Age: 50
End: 2025-06-10
Payer: COMMERCIAL

## 2025-06-10 ENCOUNTER — APPOINTMENT (OUTPATIENT)
Dept: GYNECOLOGIC ONCOLOGY | Facility: CLINIC | Age: 50
End: 2025-06-10

## 2025-06-10 VITALS
WEIGHT: 136 LBS | SYSTOLIC BLOOD PRESSURE: 148 MMHG | OXYGEN SATURATION: 100 % | BODY MASS INDEX: 22.66 KG/M2 | TEMPERATURE: 98.1 F | HEIGHT: 65 IN | DIASTOLIC BLOOD PRESSURE: 83 MMHG | RESPIRATION RATE: 17 BRPM | HEART RATE: 67 BPM

## 2025-06-10 DIAGNOSIS — M67.40 GANGLION, UNSPECIFIED SITE: Chronic | ICD-10-CM

## 2025-06-10 DIAGNOSIS — Z90.49 ACQUIRED ABSENCE OF OTHER SPECIFIED PARTS OF DIGESTIVE TRACT: Chronic | ICD-10-CM

## 2025-06-10 DIAGNOSIS — Z98.890 OTHER SPECIFIED POSTPROCEDURAL STATES: Chronic | ICD-10-CM

## 2025-06-10 DIAGNOSIS — Z79.811 LONG TERM (CURRENT) USE OF AROMATASE INHIBITORS: ICD-10-CM

## 2025-06-10 DIAGNOSIS — Z90.710 ACQUIRED ABSENCE OF BOTH CERVIX AND UTERUS: Chronic | ICD-10-CM

## 2025-06-10 DIAGNOSIS — Z90.13 ACQUIRED ABSENCE OF BILATERAL BREASTS AND NIPPLES: Chronic | ICD-10-CM

## 2025-06-10 PROCEDURE — 99214 OFFICE O/P EST MOD 30 MIN: CPT

## 2025-06-10 PROCEDURE — G2211 COMPLEX E/M VISIT ADD ON: CPT | Mod: NC

## 2025-06-11 DIAGNOSIS — Z79.811 LONG TERM (CURRENT) USE OF AROMATASE INHIBITORS: ICD-10-CM
